# Patient Record
Sex: FEMALE | Race: WHITE | Employment: OTHER | URBAN - METROPOLITAN AREA
[De-identification: names, ages, dates, MRNs, and addresses within clinical notes are randomized per-mention and may not be internally consistent; named-entity substitution may affect disease eponyms.]

---

## 2017-01-12 RX ORDER — HYDROCHLOROTHIAZIDE 25 MG/1
25 TABLET ORAL DAILY
Qty: 90 TAB | Refills: 0 | Status: SHIPPED | OUTPATIENT
Start: 2017-01-12 | End: 2017-04-18 | Stop reason: SDUPTHER

## 2017-01-12 NOTE — TELEPHONE ENCOUNTER
Patient called for   Requested Prescriptions     Pending Prescriptions Disp Refills    hydroCHLOROthiazide (HYDRODIURIL) 25 mg tablet 90 Tab 0     Sig: Take 1 Tab by mouth daily. Pharmacy confirmed.

## 2017-03-20 ENCOUNTER — OFFICE VISIT (OUTPATIENT)
Dept: ORTHOPEDIC SURGERY | Age: 72
End: 2017-03-20

## 2017-03-20 VITALS — BODY MASS INDEX: 40.63 KG/M2 | WEIGHT: 238 LBS | HEIGHT: 64 IN

## 2017-03-20 DIAGNOSIS — G89.29 CHRONIC LEFT SHOULDER PAIN: Primary | ICD-10-CM

## 2017-03-20 DIAGNOSIS — M25.512 CHRONIC LEFT SHOULDER PAIN: Primary | ICD-10-CM

## 2017-03-20 NOTE — PROGRESS NOTES
Patient: Obdulia Cobb                MRN: [id number]       SSN: [id number]  YOB: 1945        AGE: 70 y.o. SEX: female  There is no height or weight on file to calculate BMI. PCP: Pravin Florian MD  03/20/17      No chief complaint on file. HISTORY OF PRESENT ILLNESS: Obdulia Cobb is a 70 y.o. female who presents to the office for recurrent left shoulder pain. She has a hx of a fall 3 years ago. She was seen as recently as the end of November 2016. At which time she underwent a cortisone injection at the end of November and stated the pain had vanished. The pain returned mid January and has been consistent since that time. Pt is left hand dominant and has trouble sleeping at night due to the pain. She has had no numbness or paresthesias in the LUE. She has to sleep on her back to get a decent nights sleep. Review of Systems   Constitutional: Negative. HENT: Negative. Eyes: Negative. Respiratory: Negative. Cardiovascular: Negative. Gastrointestinal: Negative. Genitourinary: Negative. Musculoskeletal: Positive for myalgias (left arm). Skin: Negative. Neurological: Negative. Endo/Heme/Allergies: Negative. Psychiatric/Behavioral: Negative. Social History     Social History    Marital status:      Spouse name: N/A    Number of children: N/A    Years of education: N/A     Occupational History    Not on file. Social History Main Topics    Smoking status: Former Smoker     Packs/day: 3.00     Years: 20.00     Types: Cigarettes     Start date: 9/16/1963     Quit date: 1/5/1983    Smokeless tobacco: Never Used    Alcohol use No    Drug use: No    Sexual activity: No     Other Topics Concern    Not on file     Social History Narrative        Past Medical History:   Diagnosis Date    Asthma with COPD [facility] [address]     Bilateral pulmonary embolism ([facility] [address] 09/2015    Unprovoked. Negative hypercoaguable workup.  Completed 6 months of Xarelto.  Cervical spondylosis     Colon adenoma     CTS (carpal tunnel syndrome)     Cystitis cystica 02/2016    Intermittent symptomatic UTIs. Evaluation by Dr. Valencia Greco. Negative abdom. CT scan and renal ultrasound. Cystoscopy showing diffuse cystitis cystica.  Family history of colon cancer     GERD (gastroesophageal reflux disease)     Hypertension     Labyrinthitis     Left sided sciatica     Lumbar spondylosis     Microhematuria     Multiple pulmonary nodules determined by computed tomography of lung 03/2016    Bilateral. 3-5 mm in size. Dr. Kalpesh Gilliam.  Osteopenia     Prediabetes     Status post gastric bypass for obesity     Thyroid nodule 11/2015    Thyroid ultrasound with dominant 2.7 cm solid nodule mid-lower left lobe. Dr. Wolfgang Rendon. FNA with benign cytology. Allergies   Allergen Reactions    Macrobid [Nitrofurantoin Monohyd/M-Cryst] Anaphylaxis    Aspirin Unknown (comments)     Cannot take due to gastric bypass.  Lisinopril Cough    Parafon Forte Dsc [Chlorzoxazone] Other (comments)     Excessive drowsiness         Current Outpatient Prescriptions   Medication Sig    hydroCHLOROthiazide (HYDRODIURIL) 25 mg tablet Take 1 Tab by mouth daily.  simvastatin (ZOCOR) 20 mg tablet TAKE ONE TABLET BY MOUTH IN THE EVENING    cyclobenzaprine (FLEXERIL) 10 mg tablet Take 1 Tab by mouth three (3) times daily as needed for Muscle Spasm(s).  traMADol (ULTRAM) 50 mg tablet Take 1 Tab by mouth every six (6) hours as needed for Pain.  ondansetron hcl (ZOFRAN) 4 mg tablet Take 1 Tab by mouth every eight (8) hours as needed.  losartan (COZAAR) 25 mg tablet Take 1 Tab by mouth daily.  zafirlukast (ACCOLATE) 20 mg tablet Take 1 Tab by mouth two (2) times a day.  albuterol (PROVENTIL HFA, VENTOLIN HFA, PROAIR HFA) 90 mcg/actuation inhaler Take 1-2 Puffs by inhalation every four (4) hours as needed for Wheezing.     fluticasone-salmeterol (ADVAIR DISKUS) 250-50 mcg/dose diskus inhaler Take 1 Puff by inhalation every twelve (12) hours.  conjugated estrogens (PREMARIN) 0.625 mg/gram vaginal cream Finger tip apply to urinary opening qhs    cephALEXin (KEFLEX) 500 mg capsule Take one capsule by mouth three times daily. START TAKING MEDICATION THREE DAYS PRIOR TO PROCEDURE.  diazepam (VALIUM) 2 mg tablet Take 2 mg by mouth as needed.  oxybutynin (DITROPAN) 5 mg tablet 1-2 tab q hs    inhalational spacing device 1 Each by Does Not Apply route as needed.  cholecalciferol, vitamin d3, (VITAMIN D) 1,000 unit tablet Take 1,000 Units by mouth two (2) times a day.  cyanocobalamin (VITAMIN B-12) 500 mcg tablet Take 500 mcg by mouth daily.  CALCIUM CARBONATE/VITAMIN D3 (OS- + D PO) Take 1 Cap by mouth two (2) times a day.  POLYETHYLENE GLYCOL 3350 (MIRALAX PO) Take  by mouth nightly.  omeprazole (PRILOSEC) 20 mg capsule Take 20 mg by mouth daily. No current facility-administered medications for this visit. Physical Exam  Constitutional: she is oriented to person, place, and time and well-developed, well-nourished, and in no distress. No distress. HENT:   Head: Normocephalic and atraumatic. Right Ear: Hearing normal.   Left Ear: Hearing normal.   Nose: Nose normal.   Eyes: Conjunctivae, EOM and lids are normal. Pupils are equal, round, and reactive to light. Neck: Trachea normal.   Pulmonary/Chest: Effort normal and breath sounds normal. No respiratory distress. Abdominal: Soft. Neurological: she is alert and oriented to person, place, and time. Skin: Skin is warm, dry and intact. she is not diaphoretic. Psychiatric: Affect normal.   Nursing note and vitals reviewed. Ortho Exam   Pt has 100 degrees of shoulder flexion with pain, 70 degrees of adduction with pain, external rotation is normal and moderatly painful. Internal rotation is 80 % normal compared to the right causing moderate pain. RADIOGRAPHS:   No new x-rays taken today. IMPRESSION & PLAN: given the exam and her hx I have a concern she may have rotator cuff tear. We will proceed with an MRI. Depending on the results of the MRI, will dictate her next treatment options. I will see her back after the results of the MRI.       Scribed by Joycelyn Montiel ([facility]) as dictated by César Morton MD.

## 2017-03-26 ENCOUNTER — HOSPITAL ENCOUNTER (OUTPATIENT)
Age: 72
Discharge: HOME | End: 2017-03-26
Attending: ORTHOPAEDIC SURGERY
Payer: COMMERCIAL

## 2017-03-26 DIAGNOSIS — M25.512 CHRONIC LEFT SHOULDER PAIN: ICD-10-CM

## 2017-03-26 DIAGNOSIS — G89.29 CHRONIC LEFT SHOULDER PAIN: ICD-10-CM

## 2017-03-26 PROCEDURE — 73221 MRI JOINT UPR EXTREM W/O DYE: CPT

## 2017-03-27 ENCOUNTER — OFFICE VISIT (OUTPATIENT)
Dept: ORTHOPEDIC SURGERY | Age: 72
End: 2017-03-27

## 2017-03-27 VITALS
WEIGHT: 231 LBS | SYSTOLIC BLOOD PRESSURE: 161 MMHG | HEIGHT: 64 IN | HEART RATE: 80 BPM | DIASTOLIC BLOOD PRESSURE: 66 MMHG | BODY MASS INDEX: 39.44 KG/M2

## 2017-03-27 DIAGNOSIS — M75.112 INCOMPLETE TEAR OF LEFT ROTATOR CUFF: Primary | ICD-10-CM

## 2017-03-27 NOTE — PROGRESS NOTES
Patient: Lance Patterson                MRN: [id number]       SSN: [id number]  YOB: 1945        AGE: 70 y.o. SEX: female  There is no height or weight on file to calculate BMI. PCP: Claude Sark, MD  03/27/17      No chief complaint on file. HISTORY OF PRESENT ILLNESS: Lance Patterson is a 70 y.o. female who presents to the office for management of her left shoulder pain and to review her MRI results. MRI was consistent with a near full thickness tear in the supraspinatus with a probable thickness tear in the anterior tendon of the shoulder. Review of Systems   Constitutional: Negative. HENT: Negative. Eyes: Negative. Respiratory: Negative. Cardiovascular: Negative. Gastrointestinal: Negative. Genitourinary: Negative. Musculoskeletal: Positive for myalgias (left arm). Skin: Negative. Neurological: Negative. Endo/Heme/Allergies: Negative. Psychiatric/Behavioral: Negative. Social History     Social History    Marital status:      Spouse name: N/A    Number of children: N/A    Years of education: N/A     Occupational History    Not on file. Social History Main Topics    Smoking status: Former Smoker     Packs/day: 3.00     Years: 20.00     Types: Cigarettes     Start date: 9/16/1963     Quit date: 1/5/1983    Smokeless tobacco: Never Used    Alcohol use No    Drug use: No    Sexual activity: No     Other Topics Concern    Not on file     Social History Narrative        Past Medical History:   Diagnosis Date    [facility] COPD ([facility] [address]     Bilateral pulmonary embolism ([facility] [address] 09/2015    Unprovoked. Negative hypercoaguable workup. Completed 6 months of Xarelto.  Cervical spondylosis     Colon adenoma     CTS (carpal tunnel syndrome)     Cystitis cystica 02/2016    Intermittent symptomatic UTIs. Evaluation by Dr. Autumn Lee. Negative abdom. CT scan and renal ultrasound. Cystoscopy showing diffuse cystitis cystica.      Family history of colon cancer     GERD (gastroesophageal reflux disease)     Hypertension     Labyrinthitis     Left sided sciatica     Lumbar spondylosis     Microhematuria     Multiple pulmonary nodules determined by computed tomography of lung 03/2016    Bilateral. 3-5 mm in size. Dr. Dm Philip.  Osteopenia     Prediabetes     Status post gastric bypass for obesity     Thyroid nodule 11/2015    Thyroid ultrasound with dominant 2.7 cm solid nodule mid-lower left lobe. Dr. Madhav Laughlin. FNA with benign cytology. Allergies   Allergen Reactions    Macrobid [Nitrofurantoin Monohyd/M-Cryst] Anaphylaxis    Aspirin Unknown (comments)     Cannot take due to gastric bypass.  Lisinopril Cough    Parafon Forte Dsc [Chlorzoxazone] Other (comments)     Excessive drowsiness         Current Outpatient Prescriptions   Medication Sig    hydroCHLOROthiazide (HYDRODIURIL) 25 mg tablet Take 1 Tab by mouth daily.  simvastatin (ZOCOR) 20 mg tablet TAKE ONE TABLET BY MOUTH IN THE EVENING    cyclobenzaprine (FLEXERIL) 10 mg tablet Take 1 Tab by mouth three (3) times daily as needed for Muscle Spasm(s).  traMADol (ULTRAM) 50 mg tablet Take 1 Tab by mouth every six (6) hours as needed for Pain.  ondansetron hcl (ZOFRAN) 4 mg tablet Take 1 Tab by mouth every eight (8) hours as needed.  losartan (COZAAR) 25 mg tablet Take 1 Tab by mouth daily.  zafirlukast (ACCOLATE) 20 mg tablet Take 1 Tab by mouth two (2) times a day.  albuterol (PROVENTIL HFA, VENTOLIN HFA, PROAIR HFA) 90 mcg/actuation inhaler Take 1-2 Puffs by inhalation every four (4) hours as needed for Wheezing.  fluticasone-salmeterol (ADVAIR DISKUS) 250-50 mcg/dose diskus inhaler Take 1 Puff by inhalation every twelve (12) hours.  conjugated estrogens (PREMARIN) 0.625 mg/gram vaginal cream Finger tip apply to urinary opening qhs    cephALEXin (KEFLEX) 500 mg capsule Take one capsule by mouth three times daily.  START TAKING MEDICATION THREE DAYS PRIOR TO PROCEDURE.  diazepam (VALIUM) 2 mg tablet Take 2 mg by mouth as needed.  oxybutynin (DITROPAN) 5 mg tablet 1-2 tab q hs    inhalational spacing device 1 Each by Does Not Apply route as needed.  cholecalciferol, vitamin d3, (VITAMIN D) 1,000 unit tablet Take 1,000 Units by mouth two (2) times a day.  cyanocobalamin (VITAMIN B-12) 500 mcg tablet Take 500 mcg by mouth daily.  CALCIUM CARBONATE/VITAMIN D3 (OS- + D PO) Take 1 Cap by mouth two (2) times a day.  POLYETHYLENE GLYCOL 3350 (MIRALAX PO) Take  by mouth nightly.  omeprazole (PRILOSEC) 20 mg capsule Take 20 mg by mouth daily. No current facility-administered medications for this visit. Physical Exam  Constitutional: she is oriented to person, place, and time and well-developed, well-nourished, and in no distress. No distress. HENT:   Head: Normocephalic and atraumatic. Right Ear: Hearing normal.   Left Ear: Hearing normal.   Nose: Nose normal.   Eyes: Conjunctivae, EOM and lids are normal. Pupils are equal, round, and reactive to light. Neck: Trachea normal.   Pulmonary/Chest: Effort normal and breath sounds normal. No respiratory distress. Abdominal: Soft. Neurological: she is alert and oriented to person, place, and time. Skin: Skin is warm, dry and intact. she is not diaphoretic. Psychiatric: Affect normal.   Nursing note and vitals reviewed. Ortho Exam   Shows actively she has 70 degrees of flexion and 60 degrees of abduction which beyond she has significant pain. Internal rotation was severely limited because of pain.     RADIOGRAPHS: MRI Results (most recent):    Results from [place] encounter on 03/26/17   MRI SHOULDER LT WO CONT   Narrative EXAM: MRI of the Left Shoulder without contrast.     INDICATION: Left shoulder pain    TECHNIQUE: Multiplanar Multisequence MRI of the left shoulder obtained without  contrast.     COMPARISON: None    FINDINGS: Subacromial/Subdeltoid Bursa: Fluid is present within the subacromial subdeltoid  bursa. Supraspinatus/Infraspinatus: The supraspinatus and infraspinatus are severely  attenuated and thinned. The majority of the supraspinatus and infraspinatus is  partially torn. There is a small focus of possible full thickness defect in the  anterior supraspinatus. No significant retraction appreciated. There is moderate  atrophy of the supraspinatus and infraspinatus. There is delamination along the  infraspinatus tendon. Subscapularis: Tendinopathy is present within the subscapularis. No evidence of  partial or full-thickness tear. Subcoracoid impingement is suggested. Teres Minor: Unremarkable. Long Head of the Biceps: Mild tendinopathy is present. No full-thickness defect  appreciated. Glenoid articular surface/Labrum: Mild thinning of the glenoid and humeral head  articular cartilage is noted. The labrum is mildly irregular. However, no  discrete tear appreciated. Inferior glenohumeral ligament: Unremarkable. AC Joint and ligaments: Severe degenerative changes of the [facility] joint with  inferior osteophytes are noted. Acromion Type: Type 2 acromion is present. Osseous structures/Marrow: Mild osteophyte formation of the humeral head and  glenoid are noted. Subchondral cystic changes are noted in the lesser  tuberosity. Soft Tissue/Other: Unremarkable. Impression IMPRESSION:  1. Severe tendinopathy of the supraspinatus and infraspinatus with diffuse  partial tears. A punctate full thickness defect is suggested in the anterior  supraspinatus. Moderate atrophy of the supraspinatus and infraspinatus are  present. 2.  Tendinopathy of the subscapularis. 3.  Bicipital tendinopathy. 4.  Degenerative changes of the glenohumeral joint. 5.  Severe AC joint osteoarthritis with inferior osteophytes that can cause  impingement. 6.  Subcoracoid impingement is present. IMPRESSION & PLAN: I discussed with her the options of surgical and non surgical. Since this is her dominant side she will see Dr. Moy Arciniega for his input. .      Scribed by Cassy Bashir ([address]) as dictated by Krystian Boswell MD.

## 2017-04-04 ENCOUNTER — OFFICE VISIT (OUTPATIENT)
Dept: ORTHOPEDIC SURGERY | Age: 72
End: 2017-04-04

## 2017-04-04 VITALS
WEIGHT: 225 LBS | DIASTOLIC BLOOD PRESSURE: 73 MMHG | TEMPERATURE: 99.3 F | HEIGHT: 64 IN | HEART RATE: 91 BPM | BODY MASS INDEX: 38.41 KG/M2 | SYSTOLIC BLOOD PRESSURE: 138 MMHG

## 2017-04-04 DIAGNOSIS — M75.122 COMPLETE TEAR OF LEFT ROTATOR CUFF: Primary | ICD-10-CM

## 2017-04-04 NOTE — PROGRESS NOTES
Bobby Turpin  1945   Chief Complaint   Patient presents with    Shoulder Pain     Left, MRI results        HISTORY OF PRESENT ILLNESS  Bobby Turpin is a 70 y.o. female who presents today for reevaluation of left shoulder pain. She is referred from dr. Gracy Jin. states pain for 3 years. Been worsening in the last 3 months. Has been doing home physical therapy exercises which make the shoulder pain worse. Has significant night pain. Patient denies any fever, chills, chest pain, shortness of breath or calf pain. There are no new illness or injuries to report since last seen in the office. No changes in medications, allergies, social or family history. PHYSICAL EXAM:   Visit Vitals    /73    Pulse 91    Temp 99.3 °F (37.4 °C) (Oral)    Ht 5' 4\" (1.626 m)    Wt 225 lb (102.1 kg)    BMI 38.62 kg/m2     The patient is a well-developed, well-nourished female   in no acute distress. The patient is alert and oriented times three. The patient is alert and oriented times three. Mood and affect are normal.  LYMPHATIC: lymph nodes are not enlarged and are within normal limits  SKIN: normal in color and non tender to palpation. There are no bruises or abrasions noted. NEUROLOGICAL: Motor sensory exam is within normal limits. Reflexes are equal bilaterally.  There is normal sensation to pinprick and light touch  MUSCULOSKELETAL:  Examination Left shoulder   Skin Intact   AC joint tenderness -   Biceps tenderness -   Forward flexion/Elevation    Active abduction    Glenohumeral abduction 90   External rotation ROM 90   Internal rotation ROM 70   Apprehension -   Carolees Relocation -   Jerk -   Load and Shift -   Obriens -   Speeds -   Impingement sign +   Supraspinatus/Empty Can +4/5   External Rotation Strength +4/5   Lift Off/Belly Press -, 5/5   Neurovascular Intact         IMAGING: MRI left shoulder :1. Severe tendinopathy of the supraspinatus and infraspinatus with diffuse  partial tears. A punctate full thickness defect is suggested in the anterior  supraspinatus. Moderate atrophy of the supraspinatus and infraspinatus are  present.      2. Tendinopathy of the subscapularis.      3. Bicipital tendinopathy.      4. Degenerative changes of the glenohumeral joint.      5. Severe AC joint osteoarthritis with inferior osteophytes that can cause  impingement.      6. Subcoracoid impingement is present. IMPRESSION:      ICD-10-CM ICD-9-CM    1. Complete tear of left rotator cuff M75.122 727.61         PLAN:   1. Rotator cuff tear worsening  2. I discussed the risks and benefits and potential adverse outcomes of both operative vs non operative treatment of left rotator cuff tear with the patient and patient wishes to proceed with left shoulder arthroscopic rotator cuff repair. Risks of operative intervention include but not limited to bleeding, infection, deep vein thrombosis, pulmonary embolism, death, limb length discrepancy, reflexive sympathetic dystrophy, fat embolism syndrome,damage to blood vessels and nerves, malunion, non-union, delayed union, failure of hardware, post traumatic arthritis, stroke, heart attack, and death. Patient understands that infection may arise and may require numerous surgeries. History and physical exam to be preformed at a later date.   Follow-up Disposition: Not on [address] [place] [address], [facility]

## 2017-04-05 DIAGNOSIS — Z01.818 PREOP EXAMINATION: Primary | ICD-10-CM

## 2017-04-06 DIAGNOSIS — M75.122 COMPLETE ROTATOR CUFF TEAR OF LEFT SHOULDER: Primary | ICD-10-CM

## 2017-04-11 ENCOUNTER — OFFICE VISIT (OUTPATIENT)
Dept: ORTHOPEDIC SURGERY | Age: 72
End: 2017-04-11

## 2017-04-11 VITALS
HEIGHT: 64 IN | DIASTOLIC BLOOD PRESSURE: 115 MMHG | BODY MASS INDEX: 38.58 KG/M2 | SYSTOLIC BLOOD PRESSURE: 152 MMHG | TEMPERATURE: 98.8 F | HEART RATE: 86 BPM | WEIGHT: 226 LBS

## 2017-04-11 DIAGNOSIS — M75.122 COMPLETE TEAR OF LEFT ROTATOR CUFF: Primary | ICD-10-CM

## 2017-04-11 RX ORDER — OXYCODONE AND ACETAMINOPHEN 10; 325 MG/1; MG/1
1-2 TABLET ORAL
Qty: 60 TAB | Refills: 0 | Status: SHIPPED | OUTPATIENT
Start: 2017-04-11 | End: 2017-05-31 | Stop reason: ALTCHOICE

## 2017-04-11 NOTE — H&P
HISTORY AND PHYSICAL          Patient: Daljit Walters                MRN: [id number]       SSN: [id number]  YOB: 1945          AGE: 70 y.o. SEX: female      Patient scheduled for:  Left shoulder arthroscopic rotator cuff repair, distal clavicle excision possible biceps tenodesis. Surgeon: Rachel Stevens MD    ANESTHESIA TYPE:  General    HISTORY:     The patient was seen in the office today for a preoperative history and physical for an upcoming above listed surgery. The patient is a pleasant 70 y.o. female who has a history of severe left shoulder pain. Has pain at night. Unable to do her normal activities of living. Due to the current findings, affected activity of daily living and continued pain and discomfort, surgical intervention is indicated. The alternatives, risks, and complications, including but not limited to infection, blood loss, need for blood transfusion, neurovascular damage, jeffrey-incisional numbness, subcutaneous hematoma, bone fracture, anesthetic complications, DVT, PE, death, RSD, postoperative stiffness and pain, possible surgical scar, delayed healing and nonhealing, reflexive sympathetic dystrophy, damage to blood vessels and nerves, need for more surgery, MI, and stroke,  failure of hardware, gait disturbances,have been discussed. The patient understands and wishes to proceed with surgery. PAST MEDICAL HISTORY:     Past Medical History:   Diagnosis Date    Asthma with COPD ([address]     Bilateral pulmonary embolism ([address] 09/2015    Unprovoked. Negative hypercoaguable workup. Completed 6 months of Xarelto.  Cervical spondylosis     Colon adenoma     CTS (carpal tunnel syndrome)     Cystitis cystica 02/2016    Intermittent symptomatic UTIs. Evaluation by Dr. Adrianna Willams. Negative abdom. CT scan and renal ultrasound. Cystoscopy showing diffuse cystitis cystica.      Family history of colon cancer     GERD (gastroesophageal reflux disease)     Hypertension     Labyrinthitis     Left sided sciatica     Lumbar spondylosis     Microhematuria     Multiple pulmonary nodules determined by computed tomography of lung 03/2016    Bilateral. 3-5 mm in size. Dr. Enriqueta Paul.  Osteopenia     Prediabetes     Status post gastric bypass for obesity     Thyroid nodule 11/2015    Thyroid ultrasound with dominant 2.7 cm solid nodule mid-lower left lobe. Dr. Marietta Cochran. FNA with benign cytology. CURRENT MEDICATIONS:     Current Outpatient Prescriptions   Medication Sig Dispense Refill    oxyCODONE-acetaminophen (PERCOCET)  mg per tablet Take 1-2 Tabs by mouth every four (4) hours as needed for Pain. Max Daily Amount: 12 Tabs. Do not take until after surgery 60 Tab 0    cranberry extract 450 mg tab Take  by mouth.  hydroCHLOROthiazide (HYDRODIURIL) 25 mg tablet Take 1 Tab by mouth daily. 90 Tab 0    simvastatin (ZOCOR) 20 mg tablet TAKE ONE TABLET BY MOUTH IN THE EVENING 90 Tab 3    cyclobenzaprine (FLEXERIL) 10 mg tablet Take 1 Tab by mouth three (3) times daily as needed for Muscle Spasm(s). 30 Tab 0    traMADol (ULTRAM) 50 mg tablet Take 1 Tab by mouth every six (6) hours as needed for Pain. 50 Tab 0    ondansetron hcl (ZOFRAN) 4 mg tablet Take 1 Tab by mouth every eight (8) hours as needed. 40 Tab 1    losartan (COZAAR) 25 mg tablet Take 1 Tab by mouth daily. 90 Tab 3    zafirlukast (ACCOLATE) 20 mg tablet Take 1 Tab by mouth two (2) times a day. 60 Tab 3    albuterol (PROVENTIL HFA, VENTOLIN HFA, PROAIR HFA) 90 mcg/actuation inhaler Take 1-2 Puffs by inhalation every four (4) hours as needed for Wheezing. 1 Inhaler 3    fluticasone-salmeterol (ADVAIR DISKUS) 250-50 mcg/dose diskus inhaler Take 1 Puff by inhalation every twelve (12) hours. 1 Inhaler 3    cephALEXin (KEFLEX) 500 mg capsule Take one capsule by mouth three times daily.  START TAKING MEDICATION THREE DAYS PRIOR TO PROCEDURE. 21 Cap 0    diazepam (VALIUM) 2 mg tablet Take 2 mg by mouth as needed.  inhalational spacing device 1 Each by Does Not Apply route as needed. 1 Device 0    cholecalciferol, vitamin d3, (VITAMIN D) 1,000 unit tablet Take 1,000 Units by mouth two (2) times a day.  cyanocobalamin (VITAMIN B-12) 500 mcg tablet Take 500 mcg by mouth daily.  CALCIUM CARBONATE/VITAMIN D3 (OS- + D PO) Take 1 Cap by mouth two (2) times a day.  POLYETHYLENE GLYCOL 3350 (MIRALAX PO) Take  by mouth nightly.  omeprazole (PRILOSEC) 20 mg capsule Take 20 mg by mouth daily. ALLERGIES:     Allergies   Allergen Reactions    Macrobid [Nitrofurantoin Monohyd/M-Cryst] Anaphylaxis    Aspirin Unknown (comments)     Cannot take due to gastric bypass.     Lisinopril Cough    Parafon Forte Dsc [Chlorzoxazone] Other (comments)     Excessive drowsiness         SURGICAL HISTORY:     Past Surgical History:   Procedure Laterality Date    APPENDECTOMY      HX APPENDECTOMY      HX CARPAL TUNNEL RELEASE      twice on left, once on right    HX CHOLECYSTECTOMY      HX GASTRIC BYPASS      HX GASTRIC BYPASS      HX HEMORRHOIDECTOMY      HX HERNIA REPAIR      HX HYSTERECTOMY      LAP,CHOLECYSTECTOMY         SOCIAL HISTORY:     Social History     Social History    Marital status:      Spouse name: N/A    Number of children: N/A    Years of education: N/A     Social History Main Topics    Smoking status: Former Smoker     Packs/day: 3.00     Years: 20.00     Types: Cigarettes     Start date: 9/16/1963     Quit date: 1/5/1983    Smokeless tobacco: Never Used    Alcohol use No    Drug use: No    Sexual activity: No     Other Topics Concern    Not on file     Social History Narrative       FAMILY HISTORY:     Family History   Problem Relation Age of Onset    Other Mother      Vascular disease    Colon Cancer Father     Cancer Father     Other Sister      Gout    Hypertension Sister     Diabetes Brother     Breast Cancer Other      Grandmother REVIEW OF SYSTEMS:     Negative for fevers, chills, chest pain, shortness of breath, weight loss, recent illness     General: Negative for fever and chills. No unexpected change in weight. Denies fatigue. No change in appetite. Skin: Negative for rash or itching. HEENT: Negative for congestion, sore throat, neck pain and neck stiffness. No change in vision or hearing. Hasn't noted any enlarged lymph nodes in the neck. Cardiovascular:  Negative for chest pain and palpitations. Has not noted pedal edema. Respiratory: Negative for cough, colds, sinus, hemoptysis, shortness of breath and wheezing. Gastrointestinal: Negative for nausea and vomiting, rectal bleeding, coffee ground emesis, abdominal pain, diarrhea and constipation. Genitourinary: Negative for dysuria, frequency urgency, or burning on micturition. No flank pain, no foul smelling urine, no difficulty with initiating urination. Hematological: Negative for bleeding or easy bruising. Musculoskeletal: Negative  for arthralgias, back pain or neck pain. Neurological: Negative for dizziness, seizures or syncopal episodes. Denies headaches. Endocrine: Denies excessive thirst.  No heat/cold intolerance. Psychiatric: Negative for depression or insomnia. PHYSICAL EXAMINATION:     VITALS: There were no vitals taken for this visit. GEN:  Well developed, well nourished 70 y.o. female in no acute distress. HEENT: Normocephalic and atraumatic. Eyes: Conjunctivae and EOM are normal.Pupils are equal, round, and reactive to light. External ear normal appearance, external nose normal appearing. Mouth/Throat: Oropharynx is clear and moist, able to handle oral secretions w/out difficulty, airway patent  NECK: Supple. Normal ROM, No lymphadenopathy. Trachea is midline. No bruising, swelling or deformity  RESP: Clear to auscultation bilaterally. No wheezes, rales, rhonchi. Normal effort and breath sounds.  No respiratory distress  CARDIO:  Normal rate, regular rhythm and normal heart sounds. No MGR. ABDOMEN: Soft, non-tender, non-distended, normoactive bowel sounds in all four quadrants. There is no tenderness. There is no rebound and no guarding. BACK: No CVA or spinal tenderness  BREAST:  Deferred  PELVIC:    Deferred   RECTAL:  Deferred   :           Deferred  EXTREMITIES: EXAMINATION OF: left shoulder  Examination Left shoulder   Skin Intact   AC joint tenderness +   Biceps tenderness -   Forward flexion/Elevation    Active abduction    Glenohumeral abduction 90   External rotation ROM 90   Internal rotation ROM 70   Apprehension -   Carolees Relocation -   Jerk -   Load and Shift -   Obriens -   Speeds -   Impingement sign +   Supraspinatus/Empty Can +4/5   External Rotation Strength +4/5   Lift Off/Belly Press -, 5/5   Neurovascular Intact       NEUROVASCULAR: Sensation intact to light touch and strength grossly intact and symmetrical. No nystagmus. Positive distal pulses and capillary refill. DVT ASSESSMENT:  There is not  calf tenderness. No evidence of DVT seen on physical exam.  MOTOR: In tact  PSYCH: Alert an oriented to person, place and time. Mood, memory, affect, behavior and judgment normal       RADIOGRAPHS & DIAGNOSTIC STUDIES:     MRI/xray reveals : IMPRESSION:  1. Severe tendinopathy of the supraspinatus and infraspinatus with diffuse  partial tears. A punctate full thickness defect is suggested in the anterior  supraspinatus. Moderate atrophy of the supraspinatus and infraspinatus are  present.      2. Tendinopathy of the subscapularis.      3. Bicipital tendinopathy.      4. Degenerative changes of the glenohumeral joint.      5. Severe AC joint osteoarthritis with inferior osteophytes that can cause  impingement.      6. Subcoracoid impingement is present. LABS:     Scanned into chart    ASSESSMENT:       Encounter Diagnosis   Name Primary?     Complete tear of left rotator cuff Yes       PLAN:     Again, the alternatives, risks, and complications, as well as expected outcome were discussed. The patient understands and agrees to proceed with left shoulder arthroscopic rotator cuff repair, distal clavicle excision, possible biceps tenodesis.  Patient given orders listed below:    Orders Placed This Encounter    oxyCODONE-acetaminophen (PERCOCET)  mg per tablet         Diann Vasquez PA-C  4/11/2017  2:09 PM

## 2017-04-18 ENCOUNTER — ANESTHESIA EVENT (OUTPATIENT)
Dept: SURGERY | Age: 72
End: 2017-04-18
Payer: COMMERCIAL

## 2017-04-18 RX ORDER — HYDROCHLOROTHIAZIDE 25 MG/1
25 TABLET ORAL DAILY
Qty: 90 TAB | Refills: 1 | Status: SHIPPED | OUTPATIENT
Start: 2017-04-18 | End: 2017-10-10 | Stop reason: SDUPTHER

## 2017-04-18 NOTE — DISCHARGE INSTRUCTIONS
Dr. Yocasta Reilly Shoulder Arthroscopy  Postoperative Information    You will be given a prescription for pain medication. It may be taken every 4-6 hours as needed for the first few days after surgery. You should place an ice bag over your shoulder to help with pain and reduce swelling. A soft bandage was placed on your shoulder. You may take the bandage off two days after surgery and clean the incision sites with peroxide. Band-aids should be placed over each incision site for at least four days. There are 2 or 3 small incisions in your shoulder and they may be sore and develop bruising over the next several days. The bruising should resolve and no special care will be needed. It is safe to take a shower or bathe two days after surgery. You will be given a sling to use. Continue to wear this at all times, unless you are given different instructions. No moving the arm away from your body on your own, reaching or carrying with the operated arm. You will be shown specific exercises when you see your physical therapist tomorrow. Even though your incisions are small, there has been an operation inside and around the shoulder joint. Complete healing may take weeks or several months. If you have a high temperature, unexpected pain, redness or swelling in your shoulder please contact my office immediately. Please call to make an appointment to see me in my office in 1 week.      Dr. Yocasta Reilly office number Beth Bill from Nurse    The following personal items are in your possession at time of discharge:    Dental Appliances: None  Visual Aid: Glasses     Home Medications: None  Jewelry: Ring, With patient  Clothing: Jacket/Coat, Pants, Shirt, Undergarments, Footwear (With  Rachel Haines)  Other Valuables: Eyeglasses (With  Rachel Haines)   PATIENT INSTRUCTIONS:    After general anesthesia or intravenous sedation, for 24 hours or while taking prescription Narcotics:  · Limit your activities  · Do not drive and operate hazardous machinery  · Do not make important personal or business decisions  · Do  not drink alcoholic beverages  · If you have not urinated within 8 hours after discharge, please contact your surgeon on call. Report the following to your surgeon:  · Excessive pain, swelling, redness or odor of or around the surgical area  · Temperature over 100.5  · Nausea and vomiting lasting longer than 4 hours or if unable to take medications  · Any signs of decreased circulation or nerve impairment to extremity: change in color, persistent  numbness, tingling, coldness or increase pain  · Any questions    *  Please give a list of your current medications to your Primary Care Provider. *  Please update this list whenever your medications are discontinued, doses are      changed, or new medications (including over-the-counter products) are added. *  Please carry medication information at all times in case of emergency situations. These are general instructions for a healthy lifestyle:    No smoking/ No tobacco products/ Avoid exposure to second hand smoke    Surgeon General's Warning:  Quitting smoking now greatly reduces serious risk to your health. Obesity, smoking, and sedentary lifestyle greatly increases your risk for illness    A healthy diet, regular physical exercise & weight monitoring are important for maintaining a healthy lifestyle    You may be retaining fluid if you have a history of heart failure or if you experience any of the following symptoms:  Weight gain of 3 pounds or more overnight or 5 pounds in a week, increased swelling in our hands or feet or shortness of breath while lying flat in bed. Please call your doctor as soon as you notice any of these symptoms; do not wait until your next office visit.     Recognize signs and symptoms of STROKE:    F-face looks uneven    A-arms unable to move or move unevenly    S-speech slurred or non-existent    T-time-call 911 as soon as signs and symptoms begin-DO NOT go       Back to bed or wait to see if you get better-TIME IS BRAIN. Warning Signs of HEART ATTACK     Call 911 if you have these symptoms:   Chest discomfort. Most heart attacks involve discomfort in the center of the chest that lasts more than a few minutes, or that goes away and comes back. It can feel like uncomfortable pressure, squeezing, fullness, or pain.  Discomfort in other areas of the upper body. Symptoms can include pain or discomfort in one or both arms, the back, neck, jaw, or stomach.  Shortness of breath with or without chest discomfort.  Other signs may include breaking out in a cold sweat, nausea, or lightheadedness. Don't wait more than five minutes to call 911 - MINUTES MATTER! Fast action can save your life. Calling 911 is almost always the fastest way to get lifesaving treatment. Emergency Medical Services staff can begin treatment when they arrive -- up to an hour sooner than if someone gets to the hospital by car. The discharge information has been reviewed with the patient and spouse. The patient and spouse verbalized understanding. Discharge medications reviewed with the patient and spouse and appropriate educational materials and side effects teaching were provided.

## 2017-04-19 ENCOUNTER — ANESTHESIA (OUTPATIENT)
Dept: SURGERY | Age: 72
End: 2017-04-19
Payer: COMMERCIAL

## 2017-04-19 ENCOUNTER — HOSPITAL ENCOUNTER (OUTPATIENT)
Age: 72
Setting detail: OUTPATIENT SURGERY
Discharge: HOME | End: 2017-04-19
Attending: ORTHOPAEDIC SURGERY | Admitting: ORTHOPAEDIC SURGERY
Payer: COMMERCIAL

## 2017-04-19 VITALS
TEMPERATURE: 96.8 F | WEIGHT: 230 LBS | OXYGEN SATURATION: 97 % | RESPIRATION RATE: 14 BRPM | BODY MASS INDEX: 39.27 KG/M2 | HEART RATE: 67 BPM | HEIGHT: 64 IN | DIASTOLIC BLOOD PRESSURE: 61 MMHG | SYSTOLIC BLOOD PRESSURE: 105 MMHG

## 2017-04-19 LAB — GLUCOSE BLD STRIP.AUTO-MCNC: 122 MG/DL (ref 70–110)

## 2017-04-19 PROCEDURE — 74011000250 HC RX REV CODE- 250

## 2017-04-19 PROCEDURE — 76210000017 HC OR PH I REC 1.5 TO 2 HR: Performed by: ORTHOPAEDIC SURGERY

## 2017-04-19 PROCEDURE — 77030003598 HC NDL MULT/FIRE ARTH -C: Performed by: ORTHOPAEDIC SURGERY

## 2017-04-19 PROCEDURE — 74011250636 HC RX REV CODE- 250/636

## 2017-04-19 PROCEDURE — 74011250636 HC RX REV CODE- 250/636: Performed by: NURSE ANESTHETIST, CERTIFIED REGISTERED

## 2017-04-19 PROCEDURE — 77030031410 HC IMMOB SHLDR SLNG SUP BREG -B: Performed by: ORTHOPAEDIC SURGERY

## 2017-04-19 PROCEDURE — 77030002933 HC SUT MCRYL J&J -A: Performed by: ORTHOPAEDIC SURGERY

## 2017-04-19 PROCEDURE — 77030004453 HC BUR SHV STRY -B: Performed by: ORTHOPAEDIC SURGERY

## 2017-04-19 PROCEDURE — 76942 ECHO GUIDE FOR BIOPSY: CPT | Performed by: ANESTHESIOLOGY

## 2017-04-19 PROCEDURE — 74011250636 HC RX REV CODE- 250/636: Performed by: ANESTHESIOLOGY

## 2017-04-19 PROCEDURE — 74011000258 HC RX REV CODE- 258

## 2017-04-19 PROCEDURE — 74011250637 HC RX REV CODE- 250/637: Performed by: NURSE ANESTHETIST, CERTIFIED REGISTERED

## 2017-04-19 PROCEDURE — 76210000021 HC REC RM PH II 0.5 TO 1 HR: Performed by: ORTHOPAEDIC SURGERY

## 2017-04-19 PROCEDURE — 77030032490 HC SLV COMPR SCD KNE COVD -B: Performed by: ORTHOPAEDIC SURGERY

## 2017-04-19 PROCEDURE — 82962 GLUCOSE BLOOD TEST: CPT

## 2017-04-19 PROCEDURE — 77030008683 HC TU ET CUF COVD -A: Performed by: ANESTHESIOLOGY

## 2017-04-19 PROCEDURE — 77030003601 HC NDL NRV BLK BBMI -A: Performed by: ORTHOPAEDIC SURGERY

## 2017-04-19 PROCEDURE — 74011250636 HC RX REV CODE- 250/636: Performed by: PHYSICIAN ASSISTANT

## 2017-04-19 PROCEDURE — 77030012012 HC AIRWY OP SFT TELE -A: Performed by: ANESTHESIOLOGY

## 2017-04-19 PROCEDURE — 77030003666 HC NDL SPINAL BD -A: Performed by: ORTHOPAEDIC SURGERY

## 2017-04-19 PROCEDURE — 77030006891 HC BLD SHV RESECT STRY -B: Performed by: ORTHOPAEDIC SURGERY

## 2017-04-19 PROCEDURE — 77030013079 HC BLNKT BAIR HGGR 3M -A: Performed by: ANESTHESIOLOGY

## 2017-04-19 PROCEDURE — 77030002966 HC SUT PDS J&J -A: Performed by: ORTHOPAEDIC SURGERY

## 2017-04-19 PROCEDURE — 77030008574 HC TBNG SUC IRR STRY -B: Performed by: ORTHOPAEDIC SURGERY

## 2017-04-19 PROCEDURE — 77030031139 HC SUT VCRL2 J&J -A: Performed by: ORTHOPAEDIC SURGERY

## 2017-04-19 PROCEDURE — 77030020782 HC GWN BAIR PAWS FLX 3M -B: Performed by: ORTHOPAEDIC SURGERY

## 2017-04-19 PROCEDURE — C1713 ANCHOR/SCREW BN/BN,TIS/BN: HCPCS | Performed by: ORTHOPAEDIC SURGERY

## 2017-04-19 PROCEDURE — 77030017964 HC PRB COAG4 STRY -C: Performed by: ORTHOPAEDIC SURGERY

## 2017-04-19 PROCEDURE — 77030010427: Performed by: ORTHOPAEDIC SURGERY

## 2017-04-19 PROCEDURE — 76060000035 HC ANESTHESIA 2 TO 2.5 HR: Performed by: ORTHOPAEDIC SURGERY

## 2017-04-19 PROCEDURE — 76010000131 HC OR TIME 2 TO 2.5 HR: Performed by: ORTHOPAEDIC SURGERY

## 2017-04-19 PROCEDURE — 74011250636 HC RX REV CODE- 250/636: Performed by: ORTHOPAEDIC SURGERY

## 2017-04-19 PROCEDURE — 64415 NJX AA&/STRD BRCH PLXS IMG: CPT | Performed by: ANESTHESIOLOGY

## 2017-04-19 PROCEDURE — 77030011265 HC ELECTRD BLD HEX COVD -A: Performed by: ORTHOPAEDIC SURGERY

## 2017-04-19 DEVICE — ANCHOR SUTURE BIOCOMP 4.75X19.1 MM SWIVELOCK C: Type: IMPLANTABLE DEVICE | Status: FUNCTIONAL

## 2017-04-19 DEVICE — ANCHOR SUT L19.1MM DIA4.75MM BIOCOMPOSITE FULL THRD: Type: IMPLANTABLE DEVICE | Status: FUNCTIONAL

## 2017-04-19 RX ORDER — FENTANYL CITRATE 50 UG/ML
INJECTION, SOLUTION INTRAMUSCULAR; INTRAVENOUS AS NEEDED
Status: DISCONTINUED | OUTPATIENT
Start: 2017-04-19 | End: 2017-04-19 | Stop reason: HOSPADM

## 2017-04-19 RX ORDER — SODIUM CHLORIDE, SODIUM LACTATE, POTASSIUM CHLORIDE, CALCIUM CHLORIDE 600; 310; 30; 20 MG/100ML; MG/100ML; MG/100ML; MG/100ML
100 INJECTION, SOLUTION INTRAVENOUS CONTINUOUS
Status: DISCONTINUED | OUTPATIENT
Start: 2017-04-19 | End: 2017-04-19 | Stop reason: HOSPADM

## 2017-04-19 RX ORDER — INSULIN LISPRO 100 [IU]/ML
INJECTION, SOLUTION INTRAVENOUS; SUBCUTANEOUS ONCE
Status: DISCONTINUED | OUTPATIENT
Start: 2017-04-19 | End: 2017-04-19 | Stop reason: HOSPADM

## 2017-04-19 RX ORDER — SODIUM CHLORIDE, SODIUM LACTATE, POTASSIUM CHLORIDE, CALCIUM CHLORIDE 600; 310; 30; 20 MG/100ML; MG/100ML; MG/100ML; MG/100ML
75 INJECTION, SOLUTION INTRAVENOUS CONTINUOUS
Status: DISCONTINUED | OUTPATIENT
Start: 2017-04-19 | End: 2017-04-19 | Stop reason: HOSPADM

## 2017-04-19 RX ORDER — EPINEPHRINE 1 MG/ML
INJECTION, SOLUTION, CONCENTRATE INTRAVENOUS AS NEEDED
Status: DISCONTINUED | OUTPATIENT
Start: 2017-04-19 | End: 2017-04-19 | Stop reason: HOSPADM

## 2017-04-19 RX ORDER — GLYCOPYRROLATE 0.2 MG/ML
INJECTION INTRAMUSCULAR; INTRAVENOUS AS NEEDED
Status: DISCONTINUED | OUTPATIENT
Start: 2017-04-19 | End: 2017-04-19 | Stop reason: HOSPADM

## 2017-04-19 RX ORDER — MAGNESIUM SULFATE 100 %
4 CRYSTALS MISCELLANEOUS AS NEEDED
Status: DISCONTINUED | OUTPATIENT
Start: 2017-04-19 | End: 2017-04-19 | Stop reason: HOSPADM

## 2017-04-19 RX ORDER — MIDAZOLAM HYDROCHLORIDE 1 MG/ML
2 INJECTION, SOLUTION INTRAMUSCULAR; INTRAVENOUS ONCE
Status: COMPLETED | OUTPATIENT
Start: 2017-04-19 | End: 2017-04-19

## 2017-04-19 RX ORDER — ROCURONIUM BROMIDE 10 MG/ML
INJECTION, SOLUTION INTRAVENOUS AS NEEDED
Status: DISCONTINUED | OUTPATIENT
Start: 2017-04-19 | End: 2017-04-19 | Stop reason: HOSPADM

## 2017-04-19 RX ORDER — NEOSTIGMINE METHYLSULFATE 5 MG/5 ML
SYRINGE (ML) INTRAVENOUS AS NEEDED
Status: DISCONTINUED | OUTPATIENT
Start: 2017-04-19 | End: 2017-04-19 | Stop reason: HOSPADM

## 2017-04-19 RX ORDER — ROPIVACAINE HYDROCHLORIDE 5 MG/ML
30 INJECTION, SOLUTION EPIDURAL; INFILTRATION; PERINEURAL
Status: COMPLETED | OUTPATIENT
Start: 2017-04-19 | End: 2017-04-19

## 2017-04-19 RX ORDER — SODIUM CHLORIDE 0.9 % (FLUSH) 0.9 %
5-10 SYRINGE (ML) INJECTION AS NEEDED
Status: DISCONTINUED | OUTPATIENT
Start: 2017-04-19 | End: 2017-04-19 | Stop reason: HOSPADM

## 2017-04-19 RX ORDER — HYDROMORPHONE HYDROCHLORIDE 2 MG/ML
0.5 INJECTION, SOLUTION INTRAMUSCULAR; INTRAVENOUS; SUBCUTANEOUS
Status: DISCONTINUED | OUTPATIENT
Start: 2017-04-19 | End: 2017-04-19 | Stop reason: HOSPADM

## 2017-04-19 RX ORDER — FAMOTIDINE 20 MG/1
20 TABLET, FILM COATED ORAL ONCE
Status: COMPLETED | OUTPATIENT
Start: 2017-04-19 | End: 2017-04-19

## 2017-04-19 RX ORDER — FENTANYL CITRATE 50 UG/ML
100 INJECTION, SOLUTION INTRAMUSCULAR; INTRAVENOUS ONCE
Status: COMPLETED | OUTPATIENT
Start: 2017-04-19 | End: 2017-04-19

## 2017-04-19 RX ORDER — CEFAZOLIN SODIUM 2 G/50ML
2 SOLUTION INTRAVENOUS ONCE
Status: COMPLETED | OUTPATIENT
Start: 2017-04-19 | End: 2017-04-19

## 2017-04-19 RX ORDER — SODIUM CHLORIDE 0.9 % (FLUSH) 0.9 %
5-10 SYRINGE (ML) INJECTION EVERY 8 HOURS
Status: DISCONTINUED | OUTPATIENT
Start: 2017-04-19 | End: 2017-04-19 | Stop reason: HOSPADM

## 2017-04-19 RX ORDER — LIDOCAINE HYDROCHLORIDE 20 MG/ML
INJECTION, SOLUTION EPIDURAL; INFILTRATION; INTRACAUDAL; PERINEURAL AS NEEDED
Status: DISCONTINUED | OUTPATIENT
Start: 2017-04-19 | End: 2017-04-19 | Stop reason: HOSPADM

## 2017-04-19 RX ORDER — DEXTROSE 50 % IN WATER (D50W) INTRAVENOUS SYRINGE
25-50 AS NEEDED
Status: DISCONTINUED | OUTPATIENT
Start: 2017-04-19 | End: 2017-04-19 | Stop reason: HOSPADM

## 2017-04-19 RX ORDER — PROMETHAZINE HYDROCHLORIDE 25 MG/ML
12.5 INJECTION, SOLUTION INTRAMUSCULAR; INTRAVENOUS
Status: COMPLETED | OUTPATIENT
Start: 2017-04-19 | End: 2017-04-19

## 2017-04-19 RX ORDER — ONDANSETRON 2 MG/ML
INJECTION INTRAMUSCULAR; INTRAVENOUS AS NEEDED
Status: DISCONTINUED | OUTPATIENT
Start: 2017-04-19 | End: 2017-04-19 | Stop reason: HOSPADM

## 2017-04-19 RX ORDER — SUCCINYLCHOLINE CHLORIDE 20 MG/ML
INJECTION INTRAMUSCULAR; INTRAVENOUS AS NEEDED
Status: DISCONTINUED | OUTPATIENT
Start: 2017-04-19 | End: 2017-04-19 | Stop reason: HOSPADM

## 2017-04-19 RX ORDER — PROPOFOL 10 MG/ML
INJECTION, EMULSION INTRAVENOUS AS NEEDED
Status: DISCONTINUED | OUTPATIENT
Start: 2017-04-19 | End: 2017-04-19 | Stop reason: HOSPADM

## 2017-04-19 RX ORDER — NALOXONE HYDROCHLORIDE 0.4 MG/ML
0.1 INJECTION, SOLUTION INTRAMUSCULAR; INTRAVENOUS; SUBCUTANEOUS AS NEEDED
Status: DISCONTINUED | OUTPATIENT
Start: 2017-04-19 | End: 2017-04-19 | Stop reason: HOSPADM

## 2017-04-19 RX ORDER — FENTANYL CITRATE 50 UG/ML
50 INJECTION, SOLUTION INTRAMUSCULAR; INTRAVENOUS
Status: DISCONTINUED | OUTPATIENT
Start: 2017-04-19 | End: 2017-04-19 | Stop reason: HOSPADM

## 2017-04-19 RX ADMIN — ROCURONIUM BROMIDE 20 MG: 10 INJECTION, SOLUTION INTRAVENOUS at 08:10

## 2017-04-19 RX ADMIN — ROPIVACAINE HYDROCHLORIDE 30 MG: 5 INJECTION, SOLUTION EPIDURAL; INFILTRATION; PERINEURAL at 07:26

## 2017-04-19 RX ADMIN — FENTANYL CITRATE 50 MCG: 50 INJECTION INTRAMUSCULAR; INTRAVENOUS at 07:24

## 2017-04-19 RX ADMIN — CEFAZOLIN SODIUM 2 G: 2 SOLUTION INTRAVENOUS at 08:01

## 2017-04-19 RX ADMIN — ONDANSETRON 4 MG: 2 INJECTION INTRAMUSCULAR; INTRAVENOUS at 09:41

## 2017-04-19 RX ADMIN — SUCCINYLCHOLINE CHLORIDE 120 MG: 20 INJECTION INTRAMUSCULAR; INTRAVENOUS at 07:52

## 2017-04-19 RX ADMIN — GLYCOPYRROLATE 0.4 MG: 0.2 INJECTION INTRAMUSCULAR; INTRAVENOUS at 09:45

## 2017-04-19 RX ADMIN — MIDAZOLAM HYDROCHLORIDE 2 MG: 1 INJECTION, SOLUTION INTRAMUSCULAR; INTRAVENOUS at 07:24

## 2017-04-19 RX ADMIN — LIDOCAINE HYDROCHLORIDE 80 MG: 20 INJECTION, SOLUTION EPIDURAL; INFILTRATION; INTRACAUDAL; PERINEURAL at 07:50

## 2017-04-19 RX ADMIN — PROPOFOL 30 MG: 10 INJECTION, EMULSION INTRAVENOUS at 09:38

## 2017-04-19 RX ADMIN — FENTANYL CITRATE 100 MCG: 50 INJECTION, SOLUTION INTRAMUSCULAR; INTRAVENOUS at 07:47

## 2017-04-19 RX ADMIN — PROMETHAZINE HYDROCHLORIDE 12.5 MG: 25 INJECTION, SOLUTION INTRAMUSCULAR; INTRAVENOUS at 10:46

## 2017-04-19 RX ADMIN — Medication 3 MG: at 09:45

## 2017-04-19 RX ADMIN — FAMOTIDINE 20 MG: 20 TABLET, FILM COATED ORAL at 06:30

## 2017-04-19 RX ADMIN — SODIUM CHLORIDE, SODIUM LACTATE, POTASSIUM CHLORIDE, AND CALCIUM CHLORIDE 75 ML/HR: 600; 310; 30; 20 INJECTION, SOLUTION INTRAVENOUS at 06:26

## 2017-04-19 RX ADMIN — PROPOFOL 150 MG: 10 INJECTION, EMULSION INTRAVENOUS at 07:51

## 2017-04-19 NOTE — OP NOTES
[address]    Name:  Terrence Barker  MR#:  [id number]  :  1945  Account #:  [de-identified]  Date of Adm:  2017  Date of Surgery:  2017      PREOPERATIVE DIAGNOSIS: Rotator cuff tear, left shoulder. POSTOPERATIVE DIAGNOSIS: Rotator cuff tear, subscapularis tear,  left shoulder. PROCEDURES PERFORMED  1. Arthroscopic rotator cuff repair. 2. Arthroscopic subscapularis repair. 3. Arthroscopic acromioplasty, left shoulder. SURGEON: Shanae Barajas MD.    COMPLICATIONS: None. SPECIMENS REMOVED: None. FINDINGS: As above. ANESTHESIA: General.    ESTIMATED BLOOD LOSS: minimal    BRIEF HISTORY: The patient has had significant amount of problems  with the left shoulder, no response to conservative treatment, was  consented for surgery after having discussed at length possible risks  and complications of surgery including infection, bleeding, recurrence  of pain, among other possible problems. DESCRIPTION OF PROCEDURE: The patient was taken to the  operating room, induced under general endotracheal anesthesia by the  anesthesia staff, placed in the standard lateral decubitus position, left  arm was prepped with DuraPrep solution and draped as a free sterile  field. A posterior portal was used for arthroscopy portal. Portals were  made with 11 blade followed by blunt trocars. Once the arthroscope  was in place, the shoulder was inspected. Diffuse degenerative change  in the glenohumeral joint was present. Some fraying of the biceps  tendon was debrided to a stable base involving 25% of the biceps. The  subscapularis superior third was detached from bonefor which an anterior inferior canula  was placed and through this, the bony bed from the lesser tuberosity  was prepared to bleeding bone with a shaver. FiberTape suture was  passed through tissue with a Scorpion passer and loaded into a  SwiveLock which was impacted with stable repair. Attention was then  placed to subacromial space where very prominent bursa and the  undersurface of the acromion was found and was debrided using  shaver and cautery unit. A crescent shaped 3-4 cm tear was present. Bony bed dusted off with the shaver to bleeding bone, 2 FiberTape  sutures were placed at the edge of the articular surface. With  FiberTape, a mattress suture was passed through rotator cuff tissue  with a Scorpion passer. One limb of each FiberTape loaded into a  SwiveLock which was impacted laterally. Double pulley technique used  to suture the mattress sutures, subcutaneous tissue was closed with 3-  0 Vicryl and the skin with 4-0 Monocryl. Sterile dressings were applied. The patient tolerated the procedure well and was taken to recovery  room without problems.         MD HU Klein / Ilana Hayes  D:  04/19/2017   10:55  T:  04/19/2017   12:14  Job #:  [id number]

## 2017-04-19 NOTE — IP AVS SNAPSHOT
[address] [place] [address] 
[phone] Patient: Mark Fitzpatrick MRN: [id number] WRA:[id number] You are allergic to the following Allergen Reactions Macrobid (Nitrofurantoin Monohyd/M-Cryst) Anaphylaxis Aspirin Unknown (comments) Cannot take due to gastric bypass. Lisinopril Cough Parafon Forte Dsc (Chlorzoxazone) Other (comments) Excessive drowsiness Recent Documentation Height Weight BMI OB Status Smoking Status 1.626 m 104.3 kg 39.48 kg/m2 Hysterectomy Former Smoker Emergency Contacts Name Discharge Info Relation Home Work Mobile Wendy Albino DISCHARGE CAREGIVER [3] Spouse [3] [phone] KenneyHemal vernon  Spouse [3] [phone] About your hospitalization You were admitted on:  April 19, 2017 You last received care in the:  [facility] PACU You were discharged on:  April 19, 2017 Unit phone number:  [phone] Why you were hospitalized Your primary diagnosis was:  Not on File Providers Seen During Your Hospitalizations Provider Role Specialty Primary office phone Dianne Briones MD Attending Provider Orthopedic Surgery [phone] Your Primary Care Physician (PCP) Primary Care Physician Office Phone Office Fax [facility] [phone] Follow-up Information Follow up With Details Comments Contact Info Erika Markham MD   [address] [facility] [address] 200 [facility] 
[phone] Dianne Briones MD Schedule an appointment as soon as possible for a visit in 1 week(s)  [address] [place] [address] [address] 200 [facility] 
[phone] Your Appointments Thursday April 20, 2017 10:00 AM EDT  
pt eval with Tristan Hill, PT  
[address] [place] [address] [place] [address] [facility]  
[phone] Monday April 24, 2017  1:00 PM EDT  
POST OP with Azael Parish MD  
[address] [facility], [address] [facility] [place] ([facility] [place] 200 [facility]  
[phone] Wednesday May 24, 2017  9:15 AM EDT  
LAB with [facility] NURSE VISIT Internist of [facility] ([facility]) [address] [facility] 200 [facility]  
[phone] Wednesday May 31, 2017 10:00 AM EDT PHYSICAL with Lisa Musa MD  
Internist of [address] [place] [address] [address] [facility] 200 [facility]  
[phone] Current Discharge Medication List  
  
CONTINUE these medications which have NOT CHANGED Dose & Instructions Dispensing Information Comments Morning Noon Evening Bedtime  
 albuterol 90 mcg/actuation inhaler Commonly known as:  PROVENTIL HFA, VENTOLIN HFA, PROAIR HFA Your last dose was: Your next dose is:    
   
   
 Dose:  1-2 Puff Take 1-2 Puffs by inhalation every four (4) hours as needed for Wheezing. Quantity:  1 Inhaler Refills:  3  
     
   
   
   
  
 cephALEXin 500 mg capsule Commonly known as:  Luz Maizes Your last dose was: Your next dose is: Take one capsule by mouth three times daily. START TAKING MEDICATION THREE DAYS PRIOR TO PROCEDURE. Quantity:  21 Cap Refills:  0  
     
   
   
   
  
 cranberry extract 450 mg Tab Your last dose was: Your next dose is: Take  by mouth. Refills:  0  
     
   
   
   
  
 cyclobenzaprine 10 mg tablet Commonly known as:  FLEXERIL Your last dose was: Your next dose is:    
   
   
 Dose:  10 mg Take 1 Tab by mouth three (3) times daily as needed for Muscle Spasm(s). Quantity:  30 Tab Refills:  0  
     
   
   
   
  
 diazePAM 2 mg tablet Commonly known as:  VALIUM Your last dose was: Your next dose is: Dose:  2 mg Take 2 mg by mouth as needed. Refills:  0  
     
   
   
   
  
 fluticasone-salmeterol 250-50 mcg/dose diskus inhaler Commonly known as:  ADVAIR DISKUS Your last dose was: Your next dose is:    
   
   
 Dose:  1 Puff Take 1 Puff by inhalation every twelve (12) hours. Quantity:  1 Inhaler Refills:  3  
     
   
   
   
  
 hydroCHLOROthiazide 25 mg tablet Commonly known as:  HYDRODIURIL Your last dose was: Your next dose is:    
   
   
 Dose:  25 mg Take 1 Tab by mouth daily. Quantity:  90 Tab Refills:  1  
     
   
   
   
  
 inhalational spacing device Your last dose was: Your next dose is:    
   
   
 Dose:  1 Each  
1 Each by Does Not Apply route as needed. Quantity:  1 Device Refills:  0  
     
   
   
   
  
 losartan 25 mg tablet Commonly known as:  COZAAR Your last dose was: Your next dose is:    
   
   
 Dose:  25 mg Take 1 Tab by mouth daily. Quantity:  90 Tab Refills:  3 MIRALAX PO Your last dose was: Your next dose is: Take  by mouth nightly. Refills:  0  
     
   
   
   
  
 ondansetron hcl 4 mg tablet Commonly known as:  Alejandra Michael Your last dose was: Your next dose is:    
   
   
 Dose:  4 mg Take 1 Tab by mouth every eight (8) hours as needed. Quantity:  40 Tab Refills:  1 OS- + D PO Your last dose was: Your next dose is:    
   
   
 Dose:  1 Cap Take 1 Cap by mouth two (2) times a day. Refills:  0  
     
   
   
   
  
 oxyCODONE-acetaminophen  mg per tablet Commonly known as:  PERCOCET Your last dose was: Your next dose is:    
   
   
 Dose:  1-2 Tab Take 1-2 Tabs by mouth every four (4) hours as needed for Pain. Max Daily Amount: 12 Tabs. Do not take until after surgery Quantity:  60 Tab Refills:  0 PriLOSEC 20 mg capsule Generic drug:  omeprazole Your last dose was: Your next dose is:    
   
   
 Dose:  20 mg Take 20 mg by mouth daily. Refills:  0  
     
   
   
   
  
 simvastatin 20 mg tablet Commonly known as:  ZOCOR Your last dose was: Your next dose is: TAKE ONE TABLET BY MOUTH IN THE EVENING Quantity:  90 Tab Refills:  3  
     
   
   
   
  
 traMADol 50 mg tablet Commonly known as:  ULTRAM  
   
Your last dose was: Your next dose is:    
   
   
 Dose:  50 mg Take 1 Tab by mouth every six (6) hours as needed for Pain. Quantity:  50 Tab Refills:  0  
     
   
   
   
  
 VITAMIN B-12 500 mcg tablet Generic drug:  cyanocobalamin Your last dose was: Your next dose is:    
   
   
 Dose:  500 mcg Take 500 mcg by mouth daily. Refills:  0  
     
   
   
   
  
 VITAMIN D3 1,000 unit tablet Generic drug:  cholecalciferol Your last dose was: Your next dose is:    
   
   
 Dose:  1000 Units Take 1,000 Units by mouth two (2) times a day. Refills:  0  
     
   
   
   
  
 zafirlukast 20 mg tablet Commonly known as:  Yoan Villanueva Your last dose was: Your next dose is:    
   
   
 Dose:  20 mg Take 1 Tab by mouth two (2) times a day. Quantity:  60 Tab Refills:  3 Discharge Instructions Dr. Malina Villatoro Shoulder Arthroscopy Postoperative Information You will be given a prescription for pain medication. It may be taken every 4-6 hours as needed for the first few days after surgery. You should place an ice bag over your shoulder to help with pain and reduce swelling. A soft bandage was placed on your shoulder. You may take the bandage off two days after surgery and clean the incision sites with peroxide. Band-aids should be placed over each incision site for at least four days. There are 2 or 3 small incisions in your shoulder and they may be sore and develop bruising over the next several days. The bruising should resolve and no special care will be needed. It is safe to take a shower or bathe two days after surgery. You will be given a sling to use. Continue to wear this at all times, unless you are given different instructions. No moving the arm away from your body on your own, reaching or carrying with the operated arm. You will be shown specific exercises when you see your physical therapist tomorrow. Even though your incisions are small, there has been an operation inside and around the shoulder joint. Complete healing may take weeks or several months. If you have a high temperature, unexpected pain, redness or swelling in your shoulder please contact my office immediately. Please call to make an appointment to see me in my office in 1 week. Dr. Mark Hernandez office number [phone] DISCHARGE SUMMARY from Nurse The following personal items are in your possession at time of discharge: 
 
Dental Appliances: None Visual Aid: Glasses Home Medications: None Jewelry: Ring, With patient Clothing: Jacket/Coat, Pants, Shirt, Undergarments, Footwear (With  Sherwin Villegas) Other Valuables: Eyeglasses (With  Sherwin Villegas) PATIENT INSTRUCTIONS: 
 
 
F-face looks uneven A-arms unable to move or move unevenly S-speech slurred or non-existent T-time-call 911 as soon as signs and symptoms begin-DO NOT go Back to bed or wait to see if you get better-TIME IS BRAIN. Warning Signs of HEART ATTACK Call 911 if you have these symptoms: 
? Chest discomfort. Most heart attacks involve discomfort in the center of the chest that lasts more than a few minutes, or that goes away and comes back. It can feel like uncomfortable pressure, squeezing, fullness, or pain. ? Discomfort in other areas of the upper body. Symptoms can include pain or discomfort in one or both arms, the back, neck, jaw, or stomach. ? Shortness of breath with or without chest discomfort. ? Other signs may include breaking out in a cold sweat, nausea, or lightheadedness. Don't wait more than five minutes to call [address]! Fast action can save your life. Calling 911 is almost always the fastest way to get lifesaving treatment. Emergency Medical Services staff can begin treatment when they arrive  up to an hour sooner than if someone gets to the hospital by car. The discharge information has been reviewed with the patient and spouse. The patient and spouse verbalized understanding. Discharge medications reviewed with the patient and spouse and appropriate educational materials and side effects teaching were provided. Discharge Orders None Introducing [facility]! Neelima Ortega introduces [employer] patient portal. Now you can access parts of your medical record, email your doctor's office, and request medication refills online. 1. In your internet browser, go to [email or web address] 2. Click on the First Time User? Click Here link in the Sign In box. You will see the New Member Sign Up page. 3. Enter your [employer] Access Code exactly as it appears below. You will not need to use this code after youve completed the sign-up process. If you do not sign up before the expiration date, you must request a new code. · [employer] Access Code: [email or web address] Expires: 6/18/2017  2:06 PM 
 
4. Enter the last four digits of your Social Security Number (xxxx) and Date of Birth (mm/dd/yyyy) as indicated and click Submit. You will be taken to the next sign-up page. 5. Create a [employer] ID. This will be your [employer] login ID and cannot be changed, so think of one that is secure and easy to remember. 6. Create a [employer] password. You can change your password at any time. 7. Enter your Password Reset Question and Answer. This can be used at a later time if you forget your password. 8. Enter your e-mail address. You will receive e-mail notification when new information is available in [address] 9. Click Sign Up. You can now view and download portions of your medical record. 10. Click the Download Summary menu link to download a portable copy of your medical information. If you have questions, please visit the Frequently Asked Questions section of the [employer] website. Remember, [employer] is NOT to be used for urgent needs. For medical emergencies, dial 911. Now available from your iPhone and Android! General Information Please provide this summary of care documentation to your next provider. Patient Signature:  ____________________________________________________________ Date:  ____________________________________________________________  
  
Lemuel Rutledgeiter Provider Signature:  ____________________________________________________________ Date:  ____________________________________________________________

## 2017-04-19 NOTE — ANESTHESIA POSTPROCEDURE EVALUATION
Post-Anesthesia Evaluation and Assessment    Patient: Daljit Walters MRN: [id number]  SSN: [id number]    YOB: 1945  Age: 70 y.o. Sex: female       Cardiovascular Function/Vital Signs  Visit Vitals    /56    Pulse 65    Temp 36.3 °C (97.4 °F)    Resp 17    Ht 5' 4\" (1.626 m)    Wt 104.3 kg (230 lb)    SpO2 97%    BMI 39.48 kg/m2       Patient is status post general, regional anesthesia for Procedure(s):  LEFT SHOULDER ARTHROSCOPIC ROTATOR CUFF REPAIR. Nausea/Vomiting: None    Postoperative hydration reviewed and adequate. Pain:  Pain Scale 1: Numeric (0 - 10) (04/19/17 1037)  Pain Intensity 1: 0 (04/19/17 1037)   Managed    Neurological Status:   Neuro (WDL): Within Defined Limits (04/19/17 0602)   At baseline    Mental Status and Level of Consciousness: Arousable    Pulmonary Status:   O2 Device: Room air (04/19/17 1015)   Adequate oxygenation and airway patent    Complications related to anesthesia: None    Post-anesthesia assessment completed.  No concerns    Signed By: Bryce Hall MD     April 19, 2017

## 2017-04-19 NOTE — PROGRESS NOTES
conducted a pre-surgery visit with Logan Rodas, who is a 70 y.o.,female. The  provided the following Interventions:  Initiated a relationship of care and support. Offered prayer and assurance of continued prayers on patient's behalf. Plan:  Chaplains will continue to follow and will provide pastoral care on an as needed/requested basis.  recommends bedside caregivers page  on duty if patient shows signs of acute spiritual or emotional distress.     [address] [facility] Certified [address] [place] [address]   [phone]

## 2017-04-19 NOTE — BRIEF OP NOTE
BRIEF OPERATIVE NOTE    Date of Procedure: 4/19/2017   Preoperative Diagnosis: Complete tear of left rotator cuff [M75.122]  Postoperative Diagnosis: Complete tear of left rotator cuff [M75.122]    Procedure(s):  LEFT SHOULDER ARTHROSCOPIC ROTATOR CUFF REPAIR, acromioplasty  Surgeon(s) and Role:     * Mosie Dakins, MD - Primary            Surgical Staff:  Circ-1: Brandon Arriola, RN  Circ-Relief: Bertin Parra  Scrub Tech-1: Alma Delia Rose  Surg Asst-1: Adalid Lee  Event Time In   Incision Start 4789   Incision Close 0950     Anesthesia: General   Estimated Blood Loss: minimal  Specimens: * No specimens in log *   Findings: 3cm tear supraspinatous   Complications: none  Implants: * No implants in log *

## 2017-04-19 NOTE — ANESTHESIA PROCEDURE NOTES
Peripheral Block    Start time: 4/19/2017 7:18 AM  End time: 4/19/2017 7:25 AM  Performed by: Saad Nick  Authorized by: Elizabeth Crook       Pre-procedure: Indications: at surgeon's request, post-op pain management and procedure for pain    Preanesthetic Checklist: patient identified, risks and benefits discussed, site marked, timeout performed, anesthesia consent given and patient being monitored      Block Type:   Block Type:  Brachial plexus  Laterality:  Left  Monitoring:  Standard ASA monitoring, continuous pulse ox, frequent vital sign checks, oxygen, responsive to questions and heart rate  Injection Technique:  Single shot  Procedures: ultrasound guided    Prep: chlorhexidine    Location:  Interscalene  Needle Type:  Stimuplex  Needle Gauge:  22 G  Needle Localization:  Ultrasound guidance  Medication Injected:  0.5%  ropivacaine  Volume (mL):  25    Assessment:  Number of attempts:  1  Injection Assessment:  No intravascular symptoms, negative aspiration for blood, local visualized surrounding nerve on ultrasound, ultrasound image on chart, no paresthesia and incremental injection every 5 mL  Patient tolerance:  Patient tolerated the procedure well with no immediate complications  Location:  PREOP HOLDING    Patient given 2 mg IV Versed and 50 mcg IV Fentanyl for sedation.     4/19/2017     7:49 AM     Shalom Be MD

## 2017-04-19 NOTE — PROGRESS NOTES
Date of Surgery Update:  Nick Brown was seen and examined. History and physical has been reviewed. The patient has been examined.  There have been no significant clinical changes since the completion of the originally dated History and Physical.    Signed By: Dayana Valverde MD     April 19, 2017 10:00 AM

## 2017-04-19 NOTE — ANESTHESIA PREPROCEDURE EVALUATION
Anesthetic History   No history of anesthetic complications            Review of Systems / Medical History  Patient summary reviewed and pertinent labs reviewed    Pulmonary            Asthma : well controlled       Neuro/Psych   Within defined limits           Cardiovascular    Hypertension              Exercise tolerance: >4 METS     GI/Hepatic/Renal     GERD           Endo/Other      Hypothyroidism  Morbid obesity     Other Findings   Comments:   Risk Factors for Postoperative nausea/vomiting:       History of postoperative nausea/vomiting? NO       Female? YES       Motion sickness? NO       Intended opioid administration for postoperative analgesia?   YES           Physical Exam    Airway  Mallampati: II  TM Distance: 4 - 6 cm  Neck ROM: normal range of motion   Mouth opening: Normal     Cardiovascular  Regular rate and rhythm,  S1 and S2 normal,  no murmur, click, rub, or gallop  Rhythm: regular  Rate: normal         Dental    Dentition: Lower dentition intact and Upper dentition intact     Pulmonary  Breath sounds clear to auscultation               Abdominal  GI exam deferred       Other Findings            Anesthetic Plan    ASA: 3  Anesthesia type: general and regional - interscalene block          Induction: Intravenous  Anesthetic plan and risks discussed with: Patient and Spouse

## 2017-04-19 NOTE — PERIOP NOTES
Patient armband removed and shredded    Patient confirmed by two identifiers with discharge instructions prior to being provided to patient and spouse

## 2017-04-20 ENCOUNTER — HOSPITAL ENCOUNTER (OUTPATIENT)
Dept: PHYSICAL THERAPY | Age: 72
Discharge: HOME | End: 2017-04-20
Payer: COMMERCIAL

## 2017-04-20 PROCEDURE — G8984 CARRY CURRENT STATUS: HCPCS

## 2017-04-20 PROCEDURE — G8985 CARRY GOAL STATUS: HCPCS

## 2017-04-20 PROCEDURE — 97140 MANUAL THERAPY 1/> REGIONS: CPT

## 2017-04-20 PROCEDURE — 97110 THERAPEUTIC EXERCISES: CPT

## 2017-04-20 PROCEDURE — 97161 PT EVAL LOW COMPLEX 20 MIN: CPT

## 2017-04-20 NOTE — PROGRESS NOTES
[facility]              [address] [facility]        [place], [address] [place]   [phone] fax    Plan of Care/ Statement of Necessity for Physical Therapy Services    Patient name: Sheng Galeano Start of Care: 2017   Referral source: Jocelynn Louis : 1945    Medical Diagnosis: Complete rotator cuff tear or rupture of left shoulder, not specified as traumatic [M75.122]   Onset Date:DOS: 2017    Treatment Diagnosis: s/p L RC repair   Prior Hospitalization: see medical history Provider#: [id number]   Medications: Verified on Patient summary List    Comorbidities: Arthritis, Asthma, Back pain, BMI over 30, HTN   Prior Level of Function: Pt is retired but (I) with all ADL's and house chores. The Plan of Care and following information is based on the information from the initial evaluation. Assessment/ key information: Pt is a 69 y/o female who presents s/p L RC repair on 2017. Pt denies one specific MELINDA, she just thinks it was from wear and tear but started having pain in Sept that got to the point where she could barely use her L arm. She went to a couple different MD's and eventually an MRI was done revealing tear. Pt denies any post op complications and reports her nerve block has worn off. Pt presents with sling and abduction pillow on L shoulder. Pt presents with bandage still on L shoulder and was instructed to remove tomorrow. Pt's elbow AAROM WNL and wrist AROM WNL. Pt's PROM shoulder flex 30 deg with pain. Patient will benefit from skilled PT services to modify and progress therapeutic interventions, address functional mobility deficits, address ROM deficits, address strength deficits, analyze and address soft tissue restrictions, analyze and cue movement patterns, assess and modify postural abnormalities and instruct in home and community integration to attain remaining goals.     Evaluation Complexity History MEDIUM Complexity : 1-2 comorbidities / personal factors will impact the outcome/ POC ; Examination MEDIUM Complexity : 3 Standardized tests and measures addressing body structure, function, activity limitation and / or participation in recreation  ;Presentation LOW Complexity : Stable, uncomplicated  ;Clinical Decision Making HIGH Complexity : FOTO score of 1- 25   Overall Complexity Rating: LOW   Problem List: pain affecting function, decrease ROM, decrease strength, edema affecting function, decrease ADL/ functional abilitiies, decrease activity tolerance and decrease flexibility/ joint mobility   Treatment Plan may include any combination of the following: Therapeutic exercise, Therapeutic activities, Neuromuscular re-education, Physical agent/modality, Manual therapy and Patient education  Patient / Family readiness to learn indicated by: asking questions, trying to perform skills and interest  Persons(s) to be included in education: patient (P)  Barriers to Learning/Limitations: None  Patient Goal (s): Be able to use arm again  Patient Self Reported Health Status: fair  Rehabilitation Potential: good    Short Term Goals: To be accomplished in 2 weeks:  1) Pt will report (I) and compliance with HEP for home management of symptoms. 2) Pt will improve L shoulder PROM flex/scap to 90 deg in order to perform ADL's. Long Term Goals: To be accomplished in 4 weeks:  1) Pt's FOTO score will improve > or = 59 indicating improvements in function. 2) Pt will improve L shoulder PROM flex > or = 140 deg for ease with ADL's.  3) Pt will improve L shoulder PROM IR > or = 50 deg for ease with ADL's  4) Pt will demo L shoulder AAROM flex/scap > or = 90 deg for ease with ADL's. Frequency / Duration: Patient to be seen 3 times per week for 4 weeks.     Patient/ Caregiver education and instruction: Diagnosis, prognosis, brace/ splint application and exercises   [x]  Plan of care has been reviewed with PTA    G-Codes (GP)  Carry   Current  CL= 60-79%    Goal  CK= 40-59%    The severity rating is based on clinical judgment and the FOTO score. Lilibeth Randle, PT 4/20/2017 10:44 AM  ________________________________________________________________________    I certify that the above Therapy Services are being furnished while the patient is under my care. I agree with the treatment plan and certify that this therapy is necessary.     [de-identified] Signature:____________________  Date:____________Time: _________    Please sign and return to [facility] [address]        [place], [facility] [address]  [phone] fax

## 2017-04-20 NOTE — PROGRESS NOTES
PT DAILY TREATMENT NOTE     Patient Name: Ayesha Simeon  Date:2017  : 1945  [x]  Patient  Verified  Payor: [place] / Plan: [place] MED[place]ARE PART A / Product Type: Medicare /    In time:10:03  Out time:10:40  Total Treatment Time (min): 37  Visit #: 1 of 12    Treatment Area: Complete rotator cuff tear or rupture of left shoulder, not specified as traumatic [M75.122]    SUBJECTIVE  Pain Level (0-10 scale): 1  Any medication changes, allergies to medications, adverse drug reactions, diagnosis change, or new procedure performed?: [x] No    [] Yes (see summary sheet for update)  Subjective functional status/changes:   [] No changes reported  Pt is a 69 y/o female who presents s/p L RC repair on 2017. Pt denies one specific MELINDA, she just thinks it was from wear and tear but started having pain in Sept that got to the point where she could barely use her L arm. She went to a couple different MD's and eventually an MRI was done revealing tear. Pt denies any post op complications and reports her nerve block has worn off.      OBJECTIVE    Modality rationale:  to improve the patients ability to    Min Type Additional Details    [] Estim:  []Unatt       []IFC  []Premod                        []Other:  []w/ice   []w/heat  Position:  Location:    [] Estim: []Att    []TENS instruct  []NMES                    []Other:  []w/US   []w/ice   []w/heat  Position:  Location:    []  Traction: [] Cervical       []Lumbar                       [] Prone          []Supine                       []Intermittent   []Continuous Lbs:  [] before manual  [] after manual    []  Ultrasound: []Continuous   [] Pulsed                           []1MHz   []3MHz W/cm2:  Location:    []  Iontophoresis with dexamethasone         Location: [] Take home patch   [] In clinic    []  Ice     []  heat  []  Ice massage  []  Laser   []  Anodyne Position:  Location:    []  Laser with stim  []  Other:  Position:  Location:    []  Vasopneumatic Device Pressure:       [] lo [] med [] hi   Temperature: [] lo [] med [] hi   [] Skin assessment post-treatment:  []intact []redness- no adverse reaction    []redness  adverse reaction:     21 min [x]Eval                  []Re-Eval       8 min Therapeutic Exercise:  [x] See flow sheet : Instructed, demonstrated, and performed HEP   Rationale: increase ROM and increase strength to improve the patients ability to perform ADL's     min Therapeutic Activity:  []  See flow sheet :   Rationale:   to improve the patients ability to       min Neuromuscular Re-education:  []  See flow sheet :   Rationale:   to improve the patients ability to     8 min Manual Therapy:  Elbow PROM, Gentle GHJ mobs, PROM flex. STJ mobs   Rationale: decrease pain, increase ROM, increase tissue extensibility and decrease trigger points to increase ease with ADL's     min Gait Training:  ___ feet with ___ device on level surfaces with ___ level of assist   Rationale: With   [] TE   [] TA   [] neuro   [] other: Patient Education: [x] Review HEP    [] Progressed/Changed HEP based on:   [] positioning   [] body mechanics   [] transfers   [] heat/ice application    [] other:      Other Objective/Functional Measures: Pt presents with sling and abduction pillow on L shoulder. Pt presents with bandage still on L shoulder and was instructed to remove tomorrow. Pt's elbow AAROM WNL and wrist AROM WNL. Pt's PROM shoulder flex 30 deg with pain. Pain Level (0-10 scale) post treatment: 2    ASSESSMENT/Changes in Function: see POC    Patient will continue to benefit from skilled PT services to modify and progress therapeutic interventions, address functional mobility deficits, address ROM deficits, address strength deficits, analyze and address soft tissue restrictions, analyze and cue movement patterns, assess and modify postural abnormalities and instruct in home and community integration to attain remaining goals.      [x]  See Plan of Care  []  See progress note/recertification  []  See Discharge Summary         Progress towards goals / Updated goals:  Short term goals: to be accomplished in 2 weeks  1) Pt will report (I) and compliance with HEP for home management of symptoms. At eval: Instructed, demonstrated, and performed HEP  2) Pt will improve L shoulder PROM flex/scap to 90 deg in order to perform ADL's. At eval: L shoulder PROM flex 30 deg  Long term goals: to be accomplished in 4 weeks  1) Pt's FOTO score will improve > or = 59 indicating improvements in function. At eval: FOTO = 22  2) Pt will improve L shoulder PROM flex > or = 140 deg for ease with ADL's. At eval: L shoulder PROM flex 30 deg  3) Pt will improve L shoulder PROM IR > or = 50 deg for ease with ADL's  At eval: NT  4) Pt will demo L shoulder AAROM flex/scap > or = 90 deg for ease with ADL's.   At eval: NT    PLAN  []  Upgrade activities as tolerated     []  Continue plan of care  []  Update interventions per flow sheet       []  Discharge due to:_  [x]  Other: 3x/week for 4 weeks      Josue Otto PT 4/20/2017  10:43 AM    Future Appointments  Date Time Provider Pat Manueli   4/21/2017 8:30 AM Josue Otto PT [facility]   4/24/2017 8:30 AM Vinny Orr, PTA [facility]   4[facility]24[facility]2017 1:00 [facility]   4/26/2017 10:00 AM Vinny Orr PTA [facility]   4/28/2017 10:30 AM Mali Head, [facility]   5/1/2017 9:00 AM Malijob Head, PTA [facility]   5/3/2017 9:00 AM Malijob Head, PTA [facility]   5/5/2017 9:00 AM Malijob Head, PTA [facility]   5/8/2017 9:00 AM Mali E Laws, PTA [facility]   5/10/2017 8:30 AM Mali E Laws, PTA [facility]   5/12/2017 9:30 AM Josue Otto, PT [facility]   5/15/2017 9:30 AM Mali E Laws, PTA [facility]   5/17/2017 9:00 AM Mali E Laws, PTA [facility]   5/19/2017 9:30 AM Mali E Laws, PTA [facility]   5/24/2017 9:15 AM [facility] NURSE VISIT [facility]   5/31/2017 10:00 AM Cesar Becerril, MD [facility]

## 2017-04-21 ENCOUNTER — HOSPITAL ENCOUNTER (OUTPATIENT)
Dept: PHYSICAL THERAPY | Age: 72
Discharge: HOME | End: 2017-04-21
Payer: COMMERCIAL

## 2017-04-21 PROCEDURE — 97110 THERAPEUTIC EXERCISES: CPT

## 2017-04-21 PROCEDURE — 97140 MANUAL THERAPY 1/> REGIONS: CPT

## 2017-04-21 NOTE — PROGRESS NOTES
PT DAILY TREATMENT NOTE     Patient Name: Henna Sinclair  Date:2017  : 1945  [x]  Patient  Verified  Payor: BLUE CROSS / Plan: [employer] [facility] / Product Type: PPO /    In time:8:28  Out time:9:00  Total Treatment Time (min): 32  Visit #: 2 of 12    Treatment Area: Complete rotator cuff tear or rupture of left shoulder, not specified as traumatic [M75.122]    SUBJECTIVE  Pain Level (0-10 scale): 2  Any medication changes, allergies to medications, adverse drug reactions, diagnosis change, or new procedure performed?: [x] No    [] Yes (see summary sheet for update)  Subjective functional status/changes:   [] No changes reported  Pt reports compliance with HEP.      OBJECTIVE    Modality rationale:  to improve the patients ability to    Min Type Additional Details    [] Estim:  []Unatt       []IFC  []Premod                        []Other:  []w/ice   []w/heat  Position:  Location:    [] Estim: []Att    []TENS instruct  []NMES                    []Other:  []w/US   []w/ice   []w/heat  Position:  Location:    []  Traction: [] Cervical       []Lumbar                       [] Prone          []Supine                       []Intermittent   []Continuous Lbs:  [] before manual  [] after manual    []  Ultrasound: []Continuous   [] Pulsed                           []1MHz   []3MHz W/cm2:  Location:    []  Iontophoresis with dexamethasone         Location: [] Take home patch   [] In clinic    []  Ice     []  heat  []  Ice massage  []  Laser   []  Anodyne Position:  Location:    []  Laser with stim  []  Other:  Position:  Location:    []  Vasopneumatic Device Pressure:       [] lo [] med [] hi   Temperature: [] lo [] med [] hi   [] Skin assessment post-treatment:  []intact []redness- no adverse reaction    []redness  adverse reaction:      min []Eval                  []Re-Eval       17 min Therapeutic Exercise:  [x] See flow sheet :   Rationale: increase ROM, increase strength and improve coordination to improve the patients ability to decrease pain and perform ADL's     min Therapeutic Activity:  []  See flow sheet :   Rationale:   to improve the patients ability to       min Neuromuscular Re-education:  []  See flow sheet :   Rationale:   to improve the patients ability to     15 min Manual Therapy:  Per flow sheet   Rationale: decrease pain, increase ROM, increase tissue extensibility and decrease trigger points to increase ease with ADL's     min Gait Training:  ___ feet with ___ device on level surfaces with ___ level of assist   Rationale: With   [] TE   [] TA   [] neuro   [] other: Patient Education: [x] Review HEP    [] Progressed/Changed HEP based on:   [] positioning   [] body mechanics   [] transfers   [] heat/ice application    [] other:      Other Objective/Functional Measures:      Pain Level (0-10 scale) post treatment: 4    ASSESSMENT/Changes in Function: cont per POC    Patient will continue to benefit from skilled PT services to modify and progress therapeutic interventions, address functional mobility deficits, address ROM deficits, address strength deficits, analyze and address soft tissue restrictions, analyze and cue movement patterns, assess and modify postural abnormalities and instruct in home and community integration to attain remaining goals. []  See Plan of Care  []  See progress note/recertification  []  See Discharge Summary         Progress towards goals / Updated goals:  Short term goals: to be accomplished in 2 weeks  1) Pt will report (I) and compliance with HEP for home management of symptoms. At eval: Instructed, demonstrated, and performed HEP  Current: Goal met per pt report (4/21/17)  2) Pt will improve L shoulder PROM flex/scap to 90 deg in order to perform ADL's. At eval: L shoulder PROM flex 30 deg  Long term goals: to be accomplished in 4 weeks  1) Pt's FOTO score will improve > or = 59 indicating improvements in function.   At eval: FOTO = 22  2) Pt will improve L shoulder PROM flex > or = 140 deg for ease with ADL's. At eval: L shoulder PROM flex 30 deg  3) Pt will improve L shoulder PROM IR > or = 50 deg for ease with ADL's  At eval: NT  4) Pt will demo L shoulder AAROM flex/scap > or = 90 deg for ease with ADL's.   At eval: NT    PLAN  [x]  Upgrade activities as tolerated     [x]  Continue plan of care  []  Update interventions per flow sheet       []  Discharge due to:_  []  Other:_      Faizan Yuan, PT 4/21/2017  8:38 AM    Future Appointments  Date Time Provider Pat Connie   4/24/2017 8:30 AM Audrey Willis, PTA [facility]   4/24/2017 1:00 PM MD MATT RodriguesMD [facility]IN [facility]   4/26/2017 10:00 AM Audrey Willis, PTA [facility]   4/28/2017 10:30 AM Mali E Laws, PTA [facility]   5/1/2017 9:00 AM Mali E Laws, PTA [facility]   5/3/2017 9:00 AM Mali E Laws, PTA [facility]   5/5/2017 9:00 AM Mali E Laws, PTA [facility]   5/8/2017 9:00 AM Mali E Laws, PTA [facility]   5/10/2017 8:30 AM Mali E Laws, PTA [facility]   5/12/2017 9:30 AM Faizan Yuan, PT [facility]   5/15/2017 9:30 AM Mali E Laws, PTA [facility]   5/17/2017 9:00 AM Mali E Laws, PTA [facility]   5/19/2017 9:30 AM Mali E Laws, PTA [facility]   5/24/2017 9:15 [facility]   5/31/2017 10:00 AM Nimisha Peguero MD [facility]

## 2017-04-24 ENCOUNTER — OFFICE VISIT (OUTPATIENT)
Dept: ORTHOPEDIC SURGERY | Age: 72
End: 2017-04-24

## 2017-04-24 ENCOUNTER — HOSPITAL ENCOUNTER (OUTPATIENT)
Dept: PHYSICAL THERAPY | Age: 72
Discharge: HOME | End: 2017-04-24
Payer: COMMERCIAL

## 2017-04-24 VITALS
WEIGHT: 233 LBS | HEIGHT: 64 IN | HEART RATE: 76 BPM | SYSTOLIC BLOOD PRESSURE: 127 MMHG | DIASTOLIC BLOOD PRESSURE: 49 MMHG | BODY MASS INDEX: 39.78 KG/M2

## 2017-04-24 DIAGNOSIS — M75.122 COMPLETE TEAR OF LEFT ROTATOR CUFF: Primary | ICD-10-CM

## 2017-04-24 PROCEDURE — 97140 MANUAL THERAPY 1/> REGIONS: CPT

## 2017-04-24 PROCEDURE — 97110 THERAPEUTIC EXERCISES: CPT

## 2017-04-24 NOTE — PROGRESS NOTES
PT DAILY TREATMENT NOTE     Patient Name: Severo Mar  Date:2017  : 1945  [x]  Patient  Verified  Payor: BLUE CROSS / Plan: [facility] [place] / Product Type: PPO /    In time:8:30  Out time:9:01  Total Treatment Time (min): 31  Visit #: 3 of 12    Treatment Area: Complete rotator cuff tear or rupture of left shoulder, not specified as traumatic [M75.122]    SUBJECTIVE  Pain Level (0-10 scale): 3  Any medication changes, allergies to medications, adverse drug reactions, diagnosis change, or new procedure performed?: [x] No    [] Yes (see summary sheet for update)  Subjective functional status/changes:   [] No changes reported  Pt reports she is a little sore today, she thinks it is due to rain.      OBJECTIVE    Modality rationale:  to improve the patients ability to    Min Type Additional Details    [] Estim:  []Unatt       []IFC  []Premod                        []Other:  []w/ice   []w/heat  Position:  Location:    [] Estim: []Att    []TENS instruct  []NMES                    []Other:  []w/US   []w/ice   []w/heat  Position:  Location:    []  Traction: [] Cervical       []Lumbar                       [] Prone          []Supine                       []Intermittent   []Continuous Lbs:  [] before manual  [] after manual    []  Ultrasound: []Continuous   [] Pulsed                           []1MHz   []3MHz W/cm2:  Location:    []  Iontophoresis with dexamethasone         Location: [] Take home patch   [] In clinic    []  Ice     []  heat  []  Ice massage  []  Laser   []  Anodyne Position:  Location:    []  Laser with stim  []  Other:  Position:  Location:    []  Vasopneumatic Device Pressure:       [] lo [] med [] hi   Temperature: [] lo [] med [] hi   [] Skin assessment post-treatment:  []intact []redness- no adverse reaction    []redness  adverse reaction:      min []Eval                  []Re-Eval       16 min Therapeutic Exercise:  [x] See flow sheet :   Rationale: increase ROM, increase strength and improve coordination to improve the patients ability to perform ADL's     min Therapeutic Activity:  []  See flow sheet :   Rationale:   to improve the patients ability to       min Neuromuscular Re-education:  []  See flow sheet :   Rationale:   to improve the patients ability to     15 min Manual Therapy:  Per flow sheet   Rationale: decrease pain, increase ROM, increase tissue extensibility and decrease trigger points to increase ease with ADL's     min Gait Training:  ___ feet with ___ device on level surfaces with ___ level of assist   Rationale: With   [] TE   [] TA   [] neuro   [] other: Patient Education: [x] Review HEP    [] Progressed/Changed HEP based on:   [] positioning   [] body mechanics   [] transfers   [] heat/ice application    [] other:      Other Objective/Functional Measures:    Pain Level (0-10 scale) post treatment: 3    ASSESSMENT/Changes in Function: cont per POC    Patient will continue to benefit from skilled PT services to modify and progress therapeutic interventions, address functional mobility deficits, address ROM deficits, address strength deficits, analyze and address soft tissue restrictions, analyze and cue movement patterns, assess and modify postural abnormalities and instruct in home and community integration to attain remaining goals. []  See Plan of Care  []  See progress note/recertification  []  See Discharge Summary         Progress towards goals / Updated goals:  Short term goals: to be accomplished in 2 weeks  1) Pt will report (I) and compliance with HEP for home management of symptoms. At eval: Instructed, demonstrated, and performed HEP  Current: Goal met per pt report (4/21/17)  2) Pt will improve L shoulder PROM flex/scap to 90 deg in order to perform ADL's. At eval: L shoulder PROM flex 30 deg  Long term goals: to be accomplished in 4 weeks  1) Pt's FOTO score will improve > or = 59 indicating improvements in function.   At eval: FOTO = 22  2) Pt will improve L shoulder PROM flex > or = 140 deg for ease with ADL's. At eval: L shoulder PROM flex 30 deg  3) Pt will improve L shoulder PROM IR > or = 50 deg for ease with ADL's  At eval: NT  4) Pt will demo L shoulder AAROM flex/scap > or = 90 deg for ease with ADL's.   At eval: NT     PLAN  [x]  Upgrade activities as tolerated     [x]  Continue plan of care  []  Update interventions per flow sheet       []  Discharge due to:_  []  Other:_      Faizan Yuan, PT 4/24/2017  8:40 AM    Future Appointments  Date Time Provider Pat Manueli   4/24/2017 1:00 PM MD TERESITA Rodrigues   4/26/2017 10:00 AM Audrey Willis, PTA [facility]   4/28/2017 10:30 AM Mali E Laws, PTA [facility]   5/1/2017 9:00 AM Mali E Laws, PTA [facility]   5/3/2017 9:00 AM Mali E Laws, PTA [facility]   5/5/2017 9:00 AM Mali E Laws, PTA [facility]   5/8/2017 9:00 AM Mali E Laws, PTA [facility]   5/10/2017 8:30 AM Mali E Laws, PTA [facility]   5/12/2017 9:30 AM Faizan Yuan, PT [facility]   5/15/2017 9:30 AM Mali E Laws, PTA [facility]   5/17/2017 9:00 AM Mali E Laws, PTA [facility]   5/19/2017 9:30 AM Mali E Laws, PTA [facility]   5/24/2017 9:15 AM [facility] NURSE VISIT [facility]   5/31/2017 10:00 AM Nimisha Peguero MD [facility]

## 2017-04-24 NOTE — PROGRESS NOTES
Patient: Gallito Valdes                MRN: [id number]       SSN: [id number]  YOB: 1945        AGE: 70 y.o. SEX: female  Body mass index is 39.99 kg/(m^2). PCP: Miroslava Rivas MD  04/24/17      Chief Complaint   Patient presents with    Follow-up     s/p left shoulder arthroscopic repair       HISTORY OF PRESENT ILLNESS: Gallito Valdes is a 70 y.o. female who presents to the office for s/p 5 left shoulder rotator cuff tear. Portal sites look excellent. Review of Systems   Constitutional: Negative. HENT: Negative. Eyes: Negative. Respiratory: Negative. Cardiovascular: Negative. Gastrointestinal: Negative. Genitourinary: Negative. Musculoskeletal: Positive for joint pain (left shoulder). Skin: Negative. Neurological: Negative. Endo/Heme/Allergies: Negative. Psychiatric/Behavioral: Negative. Social History     Social History    Marital status:      Spouse name: N/A    Number of children: N/A    Years of education: N/A     Occupational History    Not on file. Social History Main Topics    Smoking status: Former Smoker     Packs/day: 3.00     Years: 20.00     Types: Cigarettes     Start date: 9/16/1963     Quit date: 1/5/1983    Smokeless tobacco: Never Used    Alcohol use No    Drug use: No    Sexual activity: No     Other Topics Concern    Not on file     Social History Narrative        Past Medical History:   Diagnosis Date    Ast[facility] with COPD ([facility] [address]     Bilateral pulmonary embolism ([facility] [address] 09/2015    Unprovoked. Negative hypercoaguable workup. Completed 6 months of Xarelto.  Cervical spondylosis     Colon adenoma     CTS (carpal tunnel syndrome)     Cystitis cystica 02/2016    Intermittent symptomatic UTIs. Evaluation by Dr. Ceci Benjamin. Negative abdom. CT scan and renal ultrasound. Cystoscopy showing diffuse cystitis cystica.      Family history of colon cancer     GERD (gastroesophageal reflux disease)     Hypertension     Labyrinthitis     Left sided sciatica     Lumbar spondylosis     Microhematuria     Multiple pulmonary nodules determined by computed tomography of lung 03/2016    Bilateral. 3-5 mm in size. Dr. Jomar Blakely.  Osteopenia     Prediabetes     Status post gastric bypass for obesity     Thyroid nodule 11/2015    Thyroid ultrasound with dominant 2.7 cm solid nodule mid-lower left lobe. Dr. Guido Turpin. FNA with benign cytology. Allergies   Allergen Reactions    Macrobid [Nitrofurantoin Monohyd/M-Cryst] Anaphylaxis    Aspirin Unknown (comments)     Cannot take due to gastric bypass.  Lisinopril Cough    Parafon Forte Dsc [Chlorzoxazone] Other (comments)     Excessive drowsiness         Current Outpatient Prescriptions   Medication Sig    hydroCHLOROthiazide (HYDRODIURIL) 25 mg tablet Take 1 Tab by mouth daily.  oxyCODONE-acetaminophen (PERCOCET)  mg per tablet Take 1-2 Tabs by mouth every four (4) hours as needed for Pain. Max Daily Amount: 12 Tabs. Do not take until after surgery    cranberry extract 450 mg tab Take  by mouth.  simvastatin (ZOCOR) 20 mg tablet TAKE ONE TABLET BY MOUTH IN THE EVENING    cyclobenzaprine (FLEXERIL) 10 mg tablet Take 1 Tab by mouth three (3) times daily as needed for Muscle Spasm(s).  traMADol (ULTRAM) 50 mg tablet Take 1 Tab by mouth every six (6) hours as needed for Pain.  ondansetron hcl (ZOFRAN) 4 mg tablet Take 1 Tab by mouth every eight (8) hours as needed.  losartan (COZAAR) 25 mg tablet Take 1 Tab by mouth daily.  zafirlukast (ACCOLATE) 20 mg tablet Take 1 Tab by mouth two (2) times a day.  albuterol (PROVENTIL HFA, VENTOLIN HFA, PROAIR HFA) 90 mcg/actuation inhaler Take 1-2 Puffs by inhalation every four (4) hours as needed for Wheezing.  fluticasone-salmeterol (ADVAIR DISKUS) 250-50 mcg/dose diskus inhaler Take 1 Puff by inhalation every twelve (12) hours.     cephALEXin (KEFLEX) 500 mg capsule Take one capsule by mouth three times daily. START TAKING MEDICATION THREE DAYS PRIOR TO PROCEDURE.  diazepam (VALIUM) 2 mg tablet Take 2 mg by mouth as needed.  inhalational spacing device 1 Each by Does Not Apply route as needed.  cholecalciferol, vitamin d3, (VITAMIN D) 1,000 unit tablet Take 1,000 Units by mouth two (2) times a day.  cyanocobalamin (VITAMIN B-12) 500 mcg tablet Take 500 mcg by mouth daily.  CALCIUM CARBONATE/VITAMIN D3 (OS- + D PO) Take 1 Cap by mouth two (2) times a day.  POLYETHYLENE GLYCOL 3350 (MIRALAX PO) Take  by mouth nightly.  omeprazole (PRILOSEC) 20 mg capsule Take 20 mg by mouth daily. No current facility-administered medications for this visit. Physical Exam  Constitutional: she is oriented to person, place, and time and well-developed, well-nourished, and in no distress. No distress. HENT:   Head: Normocephalic and atraumatic. Right Ear: Hearing normal.   Left Ear: Hearing normal.   Nose: Nose normal.   Eyes: Conjunctivae, EOM and lids are normal. Pupils are equal, round, and reactive to light. Neck: Trachea normal.   Pulmonary/Chest: Effort normal and breath sounds normal. No respiratory distress. Abdominal: Soft. Neurological: she is alert and oriented to person, place, and time. Skin: Skin is warm, dry and intact. she is not diaphoretic. Psychiatric: Affect normal.   Nursing note and vitals reviewed. Ortho Exam   There is no redness or tenderness. RADIOGRAPHS:  No new XR's taken today. IMPRESSION & PLAN:   I personally instructed the pt in gentle ROM of several techniques requiring no stress on the repair site. Pt will be seen and follow up in 2 weeks. Written by [place], as dictated by Dr. Jacque Jara, Dr. Nyla Bustos, confirm that all documentation is accurate.

## 2017-04-26 ENCOUNTER — HOSPITAL ENCOUNTER (OUTPATIENT)
Dept: PHYSICAL THERAPY | Age: 72
Discharge: HOME | End: 2017-04-26
Payer: COMMERCIAL

## 2017-04-26 ENCOUNTER — TELEPHONE (OUTPATIENT)
Dept: INTERNAL MEDICINE CLINIC | Age: 72
End: 2017-04-26

## 2017-04-26 PROCEDURE — 97140 MANUAL THERAPY 1/> REGIONS: CPT

## 2017-04-26 PROCEDURE — 97110 THERAPEUTIC EXERCISES: CPT

## 2017-04-26 NOTE — PROGRESS NOTES
PT DAILY TREATMENT NOTE     Patient Name: Sheng Galeano  Date:2017  : 1945  [x]  Patient  Verified  Payor: BLUE CROSS / Plan: [facility] / Product Type: PPO /    In time:9:48  Out time:10:25  Total Treatment Time (min): 37  Visit #: 4 of 12    Treatment Area: Complete rotator cuff tear or rupture of left shoulder, not specified as traumatic [M75.122]    SUBJECTIVE  Pain Level (0-10 scale): 2  Any medication changes, allergies to medications, adverse drug reactions, diagnosis change, or new procedure performed?: [x] No    [] Yes (see summary sheet for update)  Subjective functional status/changes:   [] No changes reported  Pt reports she went to the doctor yesterday and he stated that everything she was doing was going well. Pt reports that doctor told her to start walking her arm up the wall.      OBJECTIVE        Min Type Additional Details    [] Estim:  []Unatt       []IFC  []Premod                        []Other:  []w/ice   []w/heat  Position:  Location:    [] Estim: []Att    []TENS instruct  []NMES                    []Other:  []w/US   []w/ice   []w/heat  Position:  Location:    []  Traction: [] Cervical       []Lumbar                       [] Prone          []Supine                       []Intermittent   []Continuous Lbs:  [] before manual  [] after manual    []  Ultrasound: []Continuous   [] Pulsed                           []1MHz   []3MHz W/cm2:  Location:    []  Iontophoresis with dexamethasone         Location: [] Take home patch   [] In clinic    []  Ice     []  heat  []  Ice massage  []  Laser   []  Anodyne Position:  Location:    []  Laser with stim  []  Other:  Position:  Location:    []  Vasopneumatic Device Pressure:       [] lo [] med [] hi   Temperature: [] lo [] med [] hi   [] Skin assessment post-treatment:  []intact []redness- no adverse reaction    []redness  adverse reaction:       23 min Therapeutic Exercise:  [x] See flow sheet :   Rationale: increase ROM and increase strength to improve the patients ability to increase tolerance to activities. 14 min Manual Therapy:  Per flow sheet   Rationale: decrease pain, increase ROM, increase tissue extensibility and decrease trigger points to increase ease with ADLs. With   [] TE   [] TA   [] neuro   [] other: Patient Education: [x] Review HEP    [] Progressed/Changed HEP based on:   [] positioning   [] body mechanics   [] transfers   [] heat/ice application    [] other:      Other Objective/Functional Measures: Pt felt relief in her shoulder after manual stretching and exercises. Pain Level (0-10 scale) post treatment:0    ASSESSMENT/Changes in Function: Continue per POC. Patient will continue to benefit from skilled PT services to modify and progress therapeutic interventions, address functional mobility deficits, address ROM deficits, address strength deficits and analyze and address soft tissue restrictions to attain remaining goals. []  See Plan of Care  []  See progress note/recertification  []  See Discharge Summary         Progress towards goals / Updated goals:  Short term goals: to be accomplished in 2 weeks  1) Pt will report (I) and compliance with HEP for home management of symptoms. At eval: Instructed, demonstrated, and performed HEP  Current: Goal met per pt report (4/21/17)  2) Pt will improve L shoulder PROM flex/scap to 90 deg in order to perform ADL's. At eval: L shoulder PROM flex 30 deg  Long term goals: to be accomplished in 4 weeks  1) Pt's FOTO score will improve > or = 59 indicating improvements in function. At eval: FOTO = 22  2) Pt will improve L shoulder PROM flex > or = 140 deg for ease with ADL's. At eval: L shoulder PROM flex 30 deg  3) Pt will improve L shoulder PROM IR > or = 50 deg for ease with ADL's  At eval: NT  4) Pt will demo L shoulder AAROM flex/scap > or = 90 deg for ease with ADL's.   At eval: NT        PLAN  []  Upgrade activities as tolerated     [x] Continue plan of care  []  Update interventions per flow sheet       []  Discharge due to:_  []  Other:_      Hair Sickle, PTA 4/26/2017  10:37 AM    Future Appointments  Date Time Provider Pat Connie   4/28/2017 10:30 AM Mali E Laws, PTA [facility]   5/1/2017 9:00 AM Mali E Laws, PTA [facility]   5/3/2017 9:00 AM Mali E Laws, PTA [facility]   5/5/2017 9:00 AM Mali E Laws, PTA [facility]   5/8/2017 9:00 AM Mali E Laws, PTA [facility]   5/9/2017 1:45 PM TATO Duffy [facility] SCH[facility]   5/10/2017 8:30 AM Mali E Laws, PTA [facility]   5/12/2017 9:30 AM Bernardo Drake, PT [facility]   5/15/2017 9:30 AM Mali E Laws, PTA [facility]   5/17/2017 9:00 AM Mali E Laws, PTA [facility]   5/19/2017 9:30 AM Mali E Laws, PTA [facility]   5/24/2017 9:15 AM IO NURSE VISIT [facility] JIN SCHED   5/31/2017 10:00 AM Vania Hamlin MD [facility]

## 2017-04-26 NOTE — TELEPHONE ENCOUNTER
Patient wants to start seeing Dr. Rollins Covert.  He said that was fine but wanted to make sure you are OK with it first.

## 2017-04-28 ENCOUNTER — HOSPITAL ENCOUNTER (OUTPATIENT)
Dept: PHYSICAL THERAPY | Age: 72
Discharge: HOME | End: 2017-04-28
Payer: COMMERCIAL

## 2017-04-28 PROCEDURE — 97110 THERAPEUTIC EXERCISES: CPT

## 2017-04-28 PROCEDURE — 97140 MANUAL THERAPY 1/> REGIONS: CPT

## 2017-04-28 NOTE — PROGRESS NOTES
PT DAILY TREATMENT NOTE     Patient Name: You Griggs  Date:2017  : 1945  [x]  Patient  Verified  Payor: BLUE CROSS / Plan: "[employer] [facility] / Product Type: PPO /    In time: 10:28  Out time:11:09  Total Treatment Time (min): 41  Visit #: 5 of 12    Treatment Area: Complete rotator cuff tear or rupture of left shoulder, not specified as traumatic [M75.122]    SUBJECTIVE  Pain Level (0-10 scale): 10  Any medication changes, allergies to medications, adverse drug reactions, diagnosis change, or new procedure performed?: [x] No    [] Yes (see summary sheet for update)  Subjective functional status/changes:   [] No changes reported  Pt states her f/u was with Dr. Uriel De La Torre because Ericka Valerio was not there. She states she did not attempt any of the wall slides. OBJECTIVE    Modality rationale: decrease pain to improve the patients ability to perform ADLs.     Min Type Additional Details    [] Estim:  []Unatt       []IFC  []Premod                        []Other:  []w/ice   []w/heat  Position:  Location:    [] Estim: []Att    []TENS instruct  []NMES                    []Other:  []w/US   []w/ice   []w/heat  Position:  Location:    []  Traction: [] Cervical       []Lumbar                       [] Prone          []Supine                       []Intermittent   []Continuous Lbs:  [] before manual  [] after manual    []  Ultrasound: []Continuous   [] Pulsed                           []1MHz   []3MHz W/cm2:  Location:    []  Iontophoresis with dexamethasone         Location: [] Take home patch   [] In clinic   10 [x]  Ice     []  heat  []  Ice massage  []  Laser   []  Anodyne Position: reclined  Location: L shoulder    []  Laser with stim  []  Other:  Position:  Location:    []  Vasopneumatic Device Pressure:       [] lo [] med [] hi   Temperature: [] lo [] med [] hi   [] Skin assessment post-treatment:  []intact []redness- no adverse reaction    []redness  adverse reaction:     19 min Therapeutic Exercise:  [x] See flow sheet :   Rationale: increase ROM to improve the patients ability to perform ADLs. 12 min Manual Therapy:  Per flow sheet   Rationale: decrease pain, increase ROM, increase tissue extensibility and decrease trigger points to increase ease of ADLs. With   [] TE   [] TA   [] neuro   [] other: Patient Education: [x] Review HEP    [] Progressed/Changed HEP based on:   [] positioning   [] body mechanics   [] transfers   [] heat/ice application    [] other:      Other Objective/Functional Measures:  PROM flex 84 degrees. Pain Level (0-10 scale) post treatment: 1/10    ASSESSMENT/Changes in Function: Pt was advised not to do the wall slides that Dr. Melania Hull ordered as she is less than 2 week post-op. Patient will continue to benefit from skilled PT services to modify and progress therapeutic interventions, address functional mobility deficits, address ROM deficits and address strength deficits to attain remaining goals. []  See Plan of Care  []  See progress note/recertification  []  See Discharge Summary         Progress towards goals / Updated goals:  Short term goals: to be accomplished in 2 weeks  1) Pt will report (I) and compliance with HEP for home management of symptoms. At [facility]: Instructed, demonstrated, and performed HEP  Current: Goal met per pt report (4/21/17)  2) Pt will improve L shoulder PROM flex/scap to 90 deg in order to perform ADL's. At eval: L shoulder PROM flex 30 deg  Current: Progressing, flex reaches 84 degrees. 4/28/17  Long term goals: to be accomplished in 4 weeks  1) Pt's FOTO score will improve > or = 59 indicating improvements in function. At eval: FOTO = 22  2) Pt will improve L shoulder PROM flex > or = 140 deg for ease with ADL's. At eval: L shoulder PROM flex 30 deg  3) Pt will improve L shoulder PROM IR > or = 50 deg for ease with ADL's  At eval: NT  4) Pt will demo L shoulder AAROM flex/scap > or = 90 deg for ease with ADL's.   At eval: NT     PLAN  []  Upgrade activities as tolerated     [x]  Continue plan of care  []  Update interventions per flow sheet       []  Discharge due to:_  []  Other:_      Mali E Laws, PTA 4/28/2017  10:59 AM    Future Appointments  Date Time Provider Pat Rosales   5/1/2017 9:00 AM Mali E Laws, PTA [facility]   5/3/2017 9:00 AM Mali E Laws, PTA [facility]   5/5/2017 9:00 AM Mali E Laws, PTA [facility]   5/8/2017 9:00 AM Mali E Laws, PTA [facility]   5/9/2017 1:45 PM TATO Fuentes [facility] JIN SCHED   5/10/2017 8:30 AM Mali E Laws, PTA [facility]   5/12/2017 9:30 AM Sallye Bence, PT [facility]   5/15/2017 9:30 AM Mali E Laws, PTA [facility]   5/17/2017 9:00 AM Mali E Laws, PTA [facility]   5/19/2017 9:30 AM Mali E Laws, PTA [facility]   5/24/2017 9:15 AM IO NURSE VISIT [facility] JIN SCHED   5/31/2017 2:00 PM Raza Aguero MD [facility]

## 2017-05-01 ENCOUNTER — HOSPITAL ENCOUNTER (OUTPATIENT)
Dept: PHYSICAL THERAPY | Age: 72
Discharge: HOME | End: 2017-05-01
Payer: MEDICARE

## 2017-05-01 PROCEDURE — 97110 THERAPEUTIC EXERCISES: CPT

## 2017-05-01 PROCEDURE — 97140 MANUAL THERAPY 1/> REGIONS: CPT

## 2017-05-01 NOTE — PROGRESS NOTES
PT DAILY TREATMENT NOTE     Patient Name: Sheng Galeano  Date:2017  : 1945  [x]  Patient  Verified  Payor: Tim Pittman / Plan: [place] MEDICARE PART A / Product Type: Medicare /    In time: 8:52  Out time: 9:02  Total Treatment Time (min): 40  Visit #: 6 of 12    Treatment Area: Complete rotator cuff tear or rupture of left shoulder, not specified as traumatic [M75.122]    SUBJECTIVE  Pain Level (0-10 scale): 3/10  Any medication changes, allergies to medications, adverse drug reactions, diagnosis change, or new procedure performed?: [x] No    [] Yes (see summary sheet for update)  Subjective functional status/changes:   [] No changes reported  Pt states she had company on Saturday and did not get to her HEP until the evening, she states she got stiff. OBJECTIVE    Modality rationale: decrease pain to improve the patients ability to increase ease of ADLs.     Min Type Additional Details    [] Estim:  []Unatt       []IFC  []Premod                        []Other:  []w/ice   []w/heat  Position:  Location:    [] Estim: []Att    []TENS instruct  []NMES                    []Other:  []w/US   []w/ice   []w/heat  Position:  Location:    []  Traction: [] Cervical       []Lumbar                       [] Prone          []Supine                       []Intermittent   []Continuous Lbs:  [] before manual  [] after manual    []  Ultrasound: []Continuous   [] Pulsed                           []1MHz   []3MHz W/cm2:  Location:    []  Iontophoresis with dexamethasone         Location: [] Take home patch   [] In clinic   10 [x]  Ice     []  heat  []  Ice massage  []  Laser   []  Anodyne Position: reclined  Location: L shoulder    []  Laser with stim  []  Other:  Position:  Location:    []  Vasopneumatic Device Pressure:       [] lo [] med [] hi   Temperature: [] lo [] med [] hi   [] Skin assessment post-treatment:  []intact []redness- no adverse reaction    []redness  adverse reaction:     15 min Therapeutic Exercise:  [x] See flow sheet :   Rationale: increase ROM and increase strength to improve the patients ability to perform ADLs. 15 min Manual Therapy:  Per flow sheet   Rationale: decrease pain, increase ROM and increase tissue extensibility to increase ease of ADLs. With   [] TE   [] TA   [] neuro   [] other: Patient Education: [x] Review HEP    [] Progressed/Changed HEP based on:   [] positioning   [] body mechanics   [] transfers   [] heat/ice application    [] other:      Other Objective/Functional Measures: FOTO: 31, an increase of 9. Pain Level (0-10 scale) post treatment: 1/10    ASSESSMENT/Changes in Function: Cont per POC. Patient will continue to benefit from skilled PT services to modify and progress therapeutic interventions, address functional mobility deficits and address ROM deficits to attain remaining goals. []  See Plan of Care  []  See progress note/recertification  []  See Discharge Summary         Progress towards goals / Updated goals:  Short term goals: to be accomplished in 2 weeks  1) Pt will report (I) and compliance with HEP for home management of symptoms. At [facility]: Instructed, demonstrated, and performed HEP  Current: Goal met per pt report (4/21/17)  2) Pt will improve L shoulder PROM flex/scap to 90 deg in order to perform ADL's. At eval: L shoulder PROM flex 30 deg  Current: Progressing, flex reaches 84 degrees. 4/28/17  Long term goals: to be accomplished in 4 weeks  1) Pt's FOTO score will improve > or = 59 indicating improvements in function. At [facility]: FOTO = 22  Current:Progressing, 31, an increase of 9.  5/1/17  2) Pt will improve L shoulder PROM flex > or = 140 deg for ease with ADL's. At eval: L shoulder PROM flex 30 deg  3) Pt will improve L shoulder PROM IR > or = 50 deg for ease with ADL's  At eval: NT  4) Pt will demo L shoulder AAROM flex/scap > or = 90 deg for ease with ADL's.   At eval: NT     PLAN  []  Upgrade activities as tolerated     [x] Continue plan of care  []  Update interventions per flow sheet       []  Discharge due to:_  []  Other:_      Mali E Laws, PTA 5/1/2017  9:02 AM    Future Appointments  Date Time Provider Pat Rosales   5/3/2017 9:00 AM Mali E Laws, PTA [facility]   5/5/2017 9:00 AM Mali E Laws, PTA [facility]   5/8/2017 9:00 AM Mali E Laws, PTA [facility]   5/9/2017 1:45 PM TATO Amos [facility] JIN SCHED   5/10/2017 8:30 AM Mali E Laws, PTA [facility]   5/12/2017 9:30 AM Neville Dobson, PT [facility]   5/15/2017 9:30 AM Mali E Laws, PTA [facility]   5/17/2017 9:00 AM Mali E Laws, PTA [facility]   5/19/2017 9:30 AM Mali E Laws, PTA [facility]   5/24/2017 9:15 AM IOC NURSE VISIT [facility] JIN SCHED   5/31/2017 2:00 PM Jluis Stewart MD [facility]

## 2017-05-03 ENCOUNTER — HOSPITAL ENCOUNTER (OUTPATIENT)
Dept: PHYSICAL THERAPY | Age: 72
Discharge: HOME | End: 2017-05-03
Payer: MEDICARE

## 2017-05-03 PROCEDURE — 97140 MANUAL THERAPY 1/> REGIONS: CPT

## 2017-05-03 PROCEDURE — 97110 THERAPEUTIC EXERCISES: CPT

## 2017-05-03 NOTE — PROGRESS NOTES
PT DAILY TREATMENT NOTE     Patient Name: Myranda Mayers  Date:5/3/2017  : 1945  [x]  Patient  Verified  Payor: [employer]  / Plan: [place] MEDICARE PART A / Product Type: Medicare /    In time: 8:56  Out time:9:37  Total Treatment Time (min): 41  Visit #: 7 of 12    Treatment Area: Complete rotator cuff tear or rupture of left shoulder, not specified as traumatic [M75.122]    SUBJECTIVE  Pain Level (0-10 scale): 1/10  Any medication changes, allergies to medications, adverse drug reactions, diagnosis change, or new procedure performed?: [x] No    [] Yes (see summary sheet for update)  Subjective functional status/changes:   [] No changes reported  Pt states she is feeling okay. OBJECTIVE    Modality rationale: decrease pain to improve the patients ability to perform ADLs.     Min Type Additional Details    [] Estim:  []Unatt       []IFC  []Premod                        []Other:  []w/ice   []w/heat  Position:  Location:    [] Estim: []Att    []TENS instruct  []NMES                    []Other:  []w/US   []w/ice   []w/heat  Position:  Location:    []  Traction: [] Cervical       []Lumbar                       [] Prone          []Supine                       []Intermittent   []Continuous Lbs:  [] before manual  [] after manual    []  Ultrasound: []Continuous   [] Pulsed                           []1MHz   []3MHz W/cm2:  Location:    []  Iontophoresis with dexamethasone         Location: [] Take home patch   [] In clinic   10 [x]  Ice     []  heat  []  Ice massage  []  Laser   []  Anodyne Position: reclined  Location: L shoulder    []  Laser with stim  []  Other:  Position:  Location:    []  Vasopneumatic Device Pressure:       [] lo [] med [] hi   Temperature: [] lo [] med [] hi   [] Skin assessment post-treatment:  []intact []redness- no adverse reaction    []redness  adverse reaction:     21 min Therapeutic Exercise:  [x] See flow sheet :   Rationale: increase ROM and increase strength to improve the patients ability to increase easeof ADLs. 10 min Manual Therapy:  Per flow sheet   Rationale: decrease pain, increase ROM and increase tissue extensibility to increase ease of ADLs. With   [] TE   [] TA   [] neuro   [] other: Patient Education: [x] Review HEP    [] Progressed/Changed HEP based on:   [] positioning   [] body mechanics   [] transfers   [] heat/ice application    [] other:      Other Objective/Functional Measures: VCs throughout manual to prevent muscle guarding. Pain Level (0-10 scale) post treatment: 0/10    ASSESSMENT/Changes in Function: Cont per POC. Patient will continue to benefit from skilled PT services to modify and progress therapeutic interventions, address functional mobility deficits, address ROM deficits and address strength deficits to attain remaining goals. []  See Plan of Care  []  See progress note/recertification  []  See Discharge Summary         Progress towards goals / Updated goals:  Short term goals: to be accomplished in 2 weeks  1) Pt will report (I) and compliance with HEP for home management of symptoms. At eval: Instructed, demonstrated, and performed HEP  Current: Goal met per pt report (4/21/17)  2) Pt will improve L shoulder PROM flex/scap to 90 deg in order to perform ADL's. At eval: L shoulder PROM flex 30 deg  Current: Progressing, flex reaches 84 degrees. 4/28/17  Long term goals: to be accomplished in 4 weeks  1) Pt's FOTO score will improve > or = 59 indicating improvements in function. At eval: FOTO = 22  Current:Progressing, 31, an increase of 9. 5/1/17  2) Pt will improve L shoulder PROM flex > or = 140 deg for ease with ADL's. At eval: L shoulder PROM flex 30 deg  3) Pt will improve L shoulder PROM IR > or = 50 deg for ease with ADL's  At eval: NT  4) Pt will demo L shoulder AAROM flex/scap > or = 90 deg for ease with ADL's.   At eval: NT     PLAN  []  Upgrade activities as tolerated     [x]  Continue plan of care  []  Update interventions per flow sheet       []  Discharge due to:_  []  Other:_      Mali E Laws, PTA 5/3/2017  9:09 AM    Future Appointments  Date Time Provider Pat Rosales   5/5/2017 9:00 AM Mali E Laws, PTA [facility]   5/8/2017 9:00 AM Mali E Laws, PTA [facility]   5/9/2017 1:45 PM TATO Fontenot [facility] JIN SCHED   5/10/2017 8:30 AM Mali E Laws, PTA [facility]   5/12/2017 9:30 AM Rivas Robles, PT [facility]   5/15/2017 9:30 AM Mali E Laws, PTA [facility]   5/17/2017 9:00 AM Mali E Laws, PTA [facility]   5/19/2017 9:30 AM Mali E Laws, PTA [facility]   5/24/2017 9:15 AM IOC NURSE VISIT [facility] JIN SCHED   5/31/2017 2:00 PM Greyson Whyte MD [facility]

## 2017-05-05 ENCOUNTER — APPOINTMENT (OUTPATIENT)
Dept: PHYSICAL THERAPY | Age: 72
End: 2017-05-05
Payer: MEDICARE

## 2017-05-08 ENCOUNTER — HOSPITAL ENCOUNTER (OUTPATIENT)
Dept: PHYSICAL THERAPY | Age: 72
Discharge: HOME | End: 2017-05-08
Payer: MEDICARE

## 2017-05-08 PROCEDURE — 97110 THERAPEUTIC EXERCISES: CPT

## 2017-05-08 PROCEDURE — 97140 MANUAL THERAPY 1/> REGIONS: CPT

## 2017-05-08 NOTE — PROGRESS NOTES
PT DAILY TREATMENT NOTE     Patient Name: Dorothy Moreno  Date:2017  : 1945  [x]  Patient  Verified  Payor: Anju Philip / Plan: [place] MED[place] / Product Type: Medicare /    In time: 8:52  Out time:9:32  Total Treatment Time (min): 40  Visit #: 8 of 12    Treatment Area: Complete rotator cuff tear or rupture of left shoulder, not specified as traumatic [M75.122]    SUBJECTIVE  Pain Level (0-10 scale): 1/10  Any medication changes, allergies to medications, adverse drug reactions, diagnosis change, or new procedure performed?: [x] No    [] Yes (see summary sheet for update)  Subjective functional status/changes:   [] No changes reported  Pt states she is feeling good. OBJECTIVE    Modality rationale: decrease pain to improve the patients ability to perform ADLs.     Min Type Additional Details    [] Estim:  []Unatt       []IFC  []Premod                        []Other:  []w/ice   []w/heat  Position:  Location:    [] Estim: []Att    []TENS instruct  []NMES                    []Other:  []w/US   []w/ice   []w/heat  Position:  Location:    []  Traction: [] Cervical       []Lumbar                       [] Prone          []Supine                       []Intermittent   []Continuous Lbs:  [] before manual  [] after manual    []  Ultrasound: []Continuous   [] Pulsed                           []1MHz   []3MHz W/cm2:  Location:    []  Iontophoresis with dexamethasone         Location: [] Take home patch   [] In clinic   10 [x]  Ice     []  heat  []  Ice massage  []  Laser   []  Anodyne Position:   Location:    []  Laser with stim  []  Other:  Position:  Location:    []  Vasopneumatic Device Pressure:       [] lo [] med [] hi   Temperature: [] lo [] med [] hi   [] Skin assessment post-treatment:  []intact []redness- no adverse reaction    []redness  adverse reaction:     20 min Therapeutic Exercise:  [x] See flow sheet :   Rationale: increase ROM and increase strength to improve the patients ability to perform ADLs. 10 min Manual Therapy:  Per flow sheet   Rationale: decrease pain, increase ROM and increase tissue extensibility to increase ease of ADLs. With   [] TE   [] TA   [] neuro   [] other: Patient Education: [x] Review HEP    [] Progressed/Changed HEP based on:   [] positioning   [] body mechanics   [] transfers   [] heat/ice application    [] other:      Other Objective/Functional Measures: PROM flex 108 degrees. Pain Level (0-10 scale) post treatment: 0/10    ASSESSMENT/Changes in Function: Cont per POC. Patient will continue to benefit from skilled PT services to modify and progress therapeutic interventions, address functional mobility deficits, address ROM deficits and address strength deficits to attain remaining goals. []  See Plan of Care  []  See progress note/recertification  []  See Discharge Summary         Progress towards goals / Updated goals:  Short term goals: to be accomplished in 2 weeks  1) Pt will report (I) and compliance with HEP for home management of symptoms. At [facility]: Instructed, demonstrated, and performed HEP  Current: Goal met per pt report (4/21/17)  2) Pt will improve L shoulder PROM flex/scap to 90 deg in order to perform ADL's. At al: L shoulder PROM flex 30 deg  Current: Met, flex reaches 108 degrees. 5/8/17  Long term goals: to be accomplished in 4 weeks  1) Pt's FOTO score will improve > or = 59 indicating improvements in function. At [facility]: FOTO = 22  Current:Progressing, 31, an increase of 9. 5/1/17  2) Pt will improve L shoulder PROM flex > or = 140 deg for ease with ADL's. At al: L shoulder PROM flex 30 deg  Current: Progressing, flex 108 degrees. 5/8/17  3) Pt will improve L shoulder PROM IR > or = 50 deg for ease with ADL's  At eval: NT  4) Pt will demo L shoulder AAROM flex/scap > or = 90 deg for ease with ADL's.   At eval: NT     PLAN  []  Upgrade activities as tolerated     [x]  Continue plan of care  []  Update interventions per flow sheet       []  Discharge due to:_  []  Other:_      Apolinar Hooker, PTA 5/8/2017  9:06 AM    Future Appointments  Date Time Provider Pat Rosales   5/9/2017 1:45 PM TATO CoffeyMD [facility] SCHED   5/10/2017 8:30 AM Mali E Laws, PTA [facility]   5/12/2017 9:30 AM Jesika Jordan, PT [facility]   5/15/2017 9:30 AM Mali E Laws, PTA [facility]   5/17/2017 9:00 AM Mali E Laws, PTA [facility]   5/19/2017 9:30 AM Mali E Laws, PTA [facility]   5/24/2017 9:15 AM [facility] NURSE VISIT [facility] SCHED   5/31/2017 2:00 PM Thomas Padilla MD [facility]

## 2017-05-09 ENCOUNTER — OFFICE VISIT (OUTPATIENT)
Dept: ORTHOPEDIC SURGERY | Age: 72
End: 2017-05-09

## 2017-05-09 VITALS
HEIGHT: 64 IN | TEMPERATURE: 96.7 F | WEIGHT: 225 LBS | HEART RATE: 75 BPM | BODY MASS INDEX: 38.41 KG/M2 | DIASTOLIC BLOOD PRESSURE: 69 MMHG | SYSTOLIC BLOOD PRESSURE: 134 MMHG

## 2017-05-09 DIAGNOSIS — M75.122 COMPLETE TEAR OF LEFT ROTATOR CUFF: Primary | ICD-10-CM

## 2017-05-09 NOTE — PROGRESS NOTES
Ruthy Sims  1945     HISTORY OF PRESENT ILLNESS  Ruthy Sims is a 70 y.o. female who presents today for evaluation s/p Left shoulder arthroscopic rotator cuff repair and subscapularis repair on 4/19/17. Patient has been going to PT. Describes pain as a 3/10. Has been taking percocet rarely for pain. Still has night pain. Patient denies any fever, chills, chest pain, shortness of breath or calf pain. There are no new illness or injuries to report since last seen in the office. PHYSICAL EXAM:   Visit Vitals    /69    Pulse 75    Temp 96.7 °F (35.9 °C) (Oral)    Ht 5' 4\" (1.626 m)    Wt 225 lb (102.1 kg)    BMI 38.62 kg/m2      The patient is a well-developed, well-nourished female in no acute distress. The patient is alert and oriented times three. The patient appears to be well groomed. Mood and affect are normal.  ORTHOPEDIC EXAM of left shoulder:  Inspection: swelling not present,  Bruising not present  Incisions well healed  Passive glenohumeral abduction 0-85 degrees  Stability: Stable  Strength: n/a  2+ distal pulses    IMPRESSION:  S/P Left shoulder arthroscopic rotator cuff repair and subscapularis repair    PLAN:   1. Patient doing well  2. Continue with PT. Start active assist in 2 weeks.  Will d/c sling and start active phase when I see her back in 3 weeks  RTC 3 weeks    MAKENNA Arthur Client and Spine Specialist

## 2017-05-10 ENCOUNTER — HOSPITAL ENCOUNTER (OUTPATIENT)
Dept: PHYSICAL THERAPY | Age: 72
Discharge: HOME | End: 2017-05-10
Payer: MEDICARE

## 2017-05-10 PROCEDURE — 97140 MANUAL THERAPY 1/> REGIONS: CPT

## 2017-05-10 PROCEDURE — 97110 THERAPEUTIC EXERCISES: CPT

## 2017-05-10 NOTE — PROGRESS NOTES
PT DAILY TREATMENT NOTE     Patient Name: Ayesha Simeon  Date:5/10/2017  : 1945  [x]  Patient  Verified  Payor: [place] MEDICARE / Plan: [place] MEDICARE PART A / Product Type: Medicare /    In time: 8:24 Out time: 9:04  Total Treatment Time (min): 40  Visit #: 9 of 12    Treatment Area: Complete rotator cuff tear or rupture of left shoulder, not specified as traumatic [M75.122]    SUBJECTIVE  Pain Level (0-10 scale): 1/10  Any medication changes, allergies to medications, adverse drug reactions, diagnosis change, or new procedure performed?: [x] No    [] Yes (see summary sheet for update)  Subjective functional status/changes:   [] No changes reported  Pt states she went to the MD yesterday and Sydnee Zapata stated everything was looking good. OBJECTIVE    Modality rationale: decrease pain to improve the patients ability to perform ADLs.     Min Type Additional Details    [] Estim:  []Unatt       []IFC  []Premod                        []Other:  []w/ice   []w/heat  Position:  Location:    [] Estim: []Att    []TENS instruct  []NMES                    []Other:  []w/US   []w/ice   []w/heat  Position:  Location:    []  Traction: [] Cervical       []Lumbar                       [] Prone          []Supine                       []Intermittent   []Continuous Lbs:  [] before manual  [] after manual    []  Ultrasound: []Continuous   [] Pulsed                           []1MHz   []3MHz W/cm2:  Location:    []  Iontophoresis with dexamethasone         Location: [] Take home patch   [] In clinic   10 [x]  Ice     []  heat  []  Ice massage  []  Laser   []  Anodyne Position: reclined  Location: L shoulder.     []  Laser with stim  []  Other:  Position:  Location:    []  Vasopneumatic Device Pressure:       [] lo [] med [] hi   Temperature: [] lo [] med [] hi   [] Skin assessment post-treatment:  []intact []redness- no adverse reaction    []redness  adverse reaction:     20 min Therapeutic Exercise:  [x] See flow sheet : Rationale: increase ROM and increase strength to improve the patients ability to perform ADLs. 10 min Manual Therapy:  Per flow sheet   Rationale: decrease pain, increase ROM and increase tissue extensibility to increase ease of ADLs. With   [] TE   [] TA   [] neuro   [] other: Patient Education: [x] Review HEP    [] Progressed/Changed HEP based on:   [] positioning   [] body mechanics   [] transfers   [] heat/ice application    [] other:      Other Objective/Functional Measures: Pt reaches 90 degrees of scaption without increased pain. Pain Level (0-10 scale) post treatment: 0/10    ASSESSMENT/Changes in Function: Cont per POC. Patient will continue to benefit from skilled PT services to modify and progress therapeutic interventions, address functional mobility deficits and address ROM deficits to attain remaining goals. []  See Plan of Care  []  See progress note/recertification  []  See Discharge Summary         Progress towards goals / Updated goals:  Short term goals: to be accomplished in 2 weeks  1) Pt will report (I) and compliance with HEP for home management of symptoms. At [facility]: Instructed, demonstrated, and performed HEP  Current: Goal met per pt report (4/21/17)  2) Pt will improve L shoulder PROM flex/scap to 90 deg in order to perform ADL's. At eval: L shoulder PROM flex 30 deg  Current: Met, flex reaches 108 degrees. 5/8/17  Long term goals: to be accomplished in 4 weeks  1) Pt's FOTO score will improve > or = 59 indicating improvements in function. At [facility]: FOTO = 22  Current:Progressing, 31, an increase of 9. 5/1/17  2) Pt will improve L shoulder PROM flex > or = 140 deg for ease with ADL's. At eval: L shoulder PROM flex 30 deg  Current: Progressing, flex 108 degrees. 5/8/17  3) Pt will improve L shoulder PROM IR > or = 50 deg for ease with ADL's  At eval: NT  4) Pt will demo L shoulder AAROM flex/scap > or = 90 deg for ease with ADL's.   At eval: NT     PLAN  [] Upgrade activities as tolerated     [x]  Continue plan of care  []  Update interventions per flow sheet       []  Discharge due to:_  []  Other:_      Mali Head, PTA 5/10/2017  8:31 AM    Future Appointments  Date Time Provider Pat Rosales   5/12/2017 9:30 AM Magnolia Hernandes, PT [facility]   5/15/2017 9:30 AM Mali Head, PTA [facility]   5/17/2017 9:00 AM Mali Head, PTA [facility]   5/19/2017 9:30 AM Mali Head, PTA [facility]   5/24/2017 9:15 AM [facility] NURSE VISIT [facility] SCHED   5/30/2017 1:10 PM Marietta Rueda MD [facility] J[facility] SCHED   5/31/2017 2:00 PM Herlinda Acevedo MD [facility]

## 2017-05-12 ENCOUNTER — HOSPITAL ENCOUNTER (OUTPATIENT)
Dept: PHYSICAL THERAPY | Age: 72
Discharge: HOME | End: 2017-05-12
Payer: MEDICARE

## 2017-05-12 PROCEDURE — G8984 CARRY CURRENT STATUS: HCPCS

## 2017-05-12 PROCEDURE — 97110 THERAPEUTIC EXERCISES: CPT

## 2017-05-12 PROCEDURE — G8985 CARRY GOAL STATUS: HCPCS

## 2017-05-12 PROCEDURE — 97140 MANUAL THERAPY 1/> REGIONS: CPT

## 2017-05-12 NOTE — PROGRESS NOTES
PT DAILY TREATMENT NOTE     Patient Name: Antonella Connors  Date:2017  : 1945  [x]  Patient  Verified  Payor: [place] MEDICARE / Plan: [place] MEDICARE PART A / Product Type: Medicare /    In time: 9:25  Out time:10:07  Total Treatment Time (min): 42  Visit #: 10 of 12    Treatment Area: Complete rotator cuff tear or rupture of left shoulder, not specified as traumatic [M75.122]    SUBJECTIVE  Pain Level (0-10 scale): 0  Any medication changes, allergies to medications, adverse drug reactions, diagnosis change, or new procedure performed?: [x] No    [] Yes (see summary sheet for update)  Subjective functional status/changes:   [] No changes reported  Pt reports no current pain today.     OBJECTIVE    Modality rationale: decrease inflammation and decrease pain to improve the patients ability to perform ADL's   Min Type Additional Details    [] Estim:  []Unatt       []IFC  []Premod                        []Other:  []w/ice   []w/heat  Position:  Location:    [] Estim: []Att    []TENS instruct  []NMES                    []Other:  []w/US   []w/ice   []w/heat  Position:  Location:    []  Traction: [] Cervical       []Lumbar                       [] Prone          []Supine                       []Intermittent   []Continuous Lbs:  [] before manual  [] after manual    []  Ultrasound: []Continuous   [] Pulsed                           []1MHz   []3MHz W/cm2:  Location:    []  Iontophoresis with dexamethasone         Location: [] Take home patch   [] In clinic   10 [x]  Ice     []  heat  []  Ice massage  []  Laser   []  Anodyne Position: long sit  Location: L shoulder    []  Laser with stim  []  Other:  Position:  Location:    []  Vasopneumatic Device Pressure:       [] lo [] med [] hi   Temperature: [] lo [] med [] hi   [] Skin assessment post-treatment:  []intact []redness- no adverse reaction    []redness  adverse reaction:      min []Eval                  []Re-Eval       20 min Therapeutic Exercise:  [x] See flow sheet :   Rationale: increase ROM, increase strength and improve coordination to improve the patients ability to decrease pain and perform ADL's     min Therapeutic Activity:  []  See flow sheet :   Rationale:   to improve the patients ability to       min Neuromuscular Re-education:  []  See flow sheet :   Rationale:   to improve the patients ability to     12 min Manual Therapy:  Per flow sheet   Rationale: decrease pain, increase ROM, increase tissue extensibility and decrease trigger points to increase ease with ADL's     min Gait Training:  ___ feet with ___ device on level surfaces with ___ level of assist   Rationale: With   [] TE   [] TA   [] neuro   [] other: Patient Education: [x] Review HEP    [] Progressed/Changed HEP based on:   [] positioning   [] body mechanics   [] transfers   [] heat/ice application    [] other:      Other Objective/Functional Measures: see goals below     Pain Level (0-10 scale) post treatment: 0    ASSESSMENT/Changes in Function: cont per POC    Patient will continue to benefit from skilled PT services to modify and progress therapeutic interventions, address functional mobility deficits, address ROM deficits, address strength deficits, analyze and address soft tissue restrictions, analyze and cue movement patterns, assess and modify postural abnormalities and instruct in home and community integration to attain remaining goals. []  See Plan of Care  []  See progress note/recertification  []  See Discharge Summary         Progress towards goals / Updated goals:  Short term goals: to be accomplished in 2 weeks  1) Pt will report (I) and compliance with HEP for home management of symptoms. At eval: Instructed, demonstrated, and performed HEP  Current: Goal met per pt report (4/21/17)  2) Pt will improve L shoulder PROM flex/scap to 90 deg in order to perform ADL's. At eval: L shoulder PROM flex 30 deg  Current: Met, flex reaches 115 degrees, scap 100 deg. (5/12/17)  Long term goals: to be accomplished in 4 weeks  1) Pt's FOTO score will improve > or = 59 indicating improvements in function. At eval: FOTO = 22  Current:Progressing, 31, an increase of 9. 5/1/17  2) Pt will improve L shoulder PROM flex > or = 140 deg for ease with ADL's. At eval: L shoulder PROM flex 30 deg  Current: Progressing, flex 115 degrees. (5/12/17)  3) Pt will improve L shoulder PROM IR > or = 50 deg for ease with ADL's  At eval: NT  Current: Goal met- L shoulder PROM IR 60 deg (5/12/17)  4) Pt will demo L shoulder AAROM flex/scap > or = 90 deg for ease with ADL's.   At eval: NT  Current: Per script from MD on 5/9/17, begin AAROM on 5/23/17. (5/12/17)     PLAN  [x]  Upgrade activities as tolerated     [x]  Continue plan of care  []  Update interventions per flow sheet       []  Discharge due to:_  []  Other:_      Lonia Coma, PT 5/12/2017  9:19 AM    Future Appointments  Date Time Provider Pat Rosales   5/12/2017 9:30 AM Lonia Coma, PT [facility] [address]   5/15/2017 9:30 AM Malijob Head, PTA [facility] [address]   5/17/2017 9:00 AM Malijob Head, PTA [facility] [address]   5/19/2017 9:30 AM Malijob Head, PTA [facility] [address]   5/24/2017 9:15 AM [facility] NURSE VISIT [facility] JIN SCHED   5/30/2017 1:10 PM Raisa Davidson MD [facility] JIN SCHED   5/31/2017 2:00 PM Jason Lynn MD [facility]

## 2017-05-12 NOTE — PROGRESS NOTES
[facility]              [address], [address]   [phone] fax    Progress Note  Patient name: Bobby Turpni Start of Care: 2017   Referral source: Shannan Monahan : 1945   Medical/Treatment Diagnosis: Complete rotator cuff tear or rupture of left shoulder, not specified as traumatic [M75.122] Onset Date:DOS: 2017     Prior Hospitalization: see medical history Provider#: [id number]   Medications: Verified on Patient Summary List    Comorbidities: Arthritis, Asthma, Back pain, BMI over 30, HTN  Prior Level of Function: Pt is retired but (I) with all ADL's and house chores. Visits from Start of Care: 10    Missed Visits: 1    Key Functional Changes:   Progress towards goals / Updated goals:  Short term goals: to be accomplished in 2 weeks  1) Pt will report (I) and compliance with HEP for home management of symptoms. At eval: Instructed, demonstrated, and performed HEP  Current: Goal met per pt report   2) Pt will improve L shoulder PROM flex/scap to 90 deg in order to perform ADL's. At eval: L shoulder PROM flex 30 deg  Current: Met, flex reaches 115 degrees, scap 100 deg. Long term goals: to be accomplished in 4 weeks  1) Pt's FOTO score will improve > or = 59 indicating improvements in function. At eval: FOTO = 22  Current:Progressing, 31, an increase of 9.   2) Pt will improve L shoulder PROM flex > or = 140 deg for ease with ADL's. At eval: L shoulder PROM flex 30 deg  Current: Progressing, flex 115 degrees. 3) Pt will improve L shoulder PROM IR > or = 50 deg for ease with ADL's  At eval: NT  Current: Goal met- L shoulder PROM IR 60 deg   4) Pt will demo L shoulder AAROM flex/scap > or = 90 deg for ease with ADL's. At eval: NT  Current: Per script from MD on 17, begin AAROM on 17. Pt has demonstrated steady progress per post op protocol.  Pt has demonstrated improvements in L shoulder PROM, pain control, and overall function. Patient will continue to benefit from skilled PT services to modify and progress therapeutic interventions, address functional mobility deficits, address ROM deficits, address strength deficits, analyze and address soft tissue restrictions, analyze and cue movement patterns, assess and modify postural abnormalities and instruct in home and community integration to attain remaining goals. Updated Goals: to be achieved in 4 weeks:   Continue with above unmet goals    G-Codes (GP)  Carry   Current CL= 60-79%   Goal CK= 40-59%    ASSESSMENT/RECOMMENDATIONS:  [x]Continue therapy per initial plan/protocol at a frequency of  3 x per week for 4 weeks  []Continue therapy with the following recommended changes:_____________________      _____________________________________________________________________  []Discontinue therapy progressing towards or have reached established goals  []Discontinue therapy due to lack of appreciable progress towards goals  []Discontinue therapy due to lack of attendance or compliance  []Await Physician's recommendations/decisions regarding therapy  []Other:________________________________________________________________    Thank you for this referral.    Sravani Bobby, PT 5/12/2017 9:23 AM  NOTE TO PHYSICIAN:  Karena [address]ellon [address]   [facility]: [phone]  If you are unable to process this request in 24 hours please contact our office: [phone]    []  I have read the above report and request that my patient continue as recommended. []  I have read the above report and request that my patient continue therapy with the following changes/special instructions:________________________________________  []I have read the above report and request that my patient be discharged from therapy.     Physicians signature: ________________________________Date: _____Time:_____

## 2017-05-15 ENCOUNTER — HOSPITAL ENCOUNTER (OUTPATIENT)
Dept: PHYSICAL THERAPY | Age: 72
Discharge: HOME | End: 2017-05-15
Payer: MEDICARE

## 2017-05-15 DIAGNOSIS — R05.3 PERSISTENT COUGH: ICD-10-CM

## 2017-05-15 PROCEDURE — 97140 MANUAL THERAPY 1/> REGIONS: CPT

## 2017-05-15 PROCEDURE — 97110 THERAPEUTIC EXERCISES: CPT

## 2017-05-15 RX ORDER — LOSARTAN POTASSIUM 25 MG/1
25 TABLET ORAL DAILY
Qty: 90 TAB | Refills: 3 | Status: SHIPPED | OUTPATIENT
Start: 2017-05-15 | End: 2018-05-10 | Stop reason: SDUPTHER

## 2017-05-15 NOTE — PROGRESS NOTES
PT DAILY TREATMENT NOTE     Patient Name: Sheng Galeano  Date:5/15/2017  : 1945  [x]  Patient  Verified  Payor: Tim Pittman / Plan: [place] MEDICARE PART A / Product Type: Medicare /    In time: 9:21  Out time: 10:05  Total Treatment Time (min): 44  Visit #: 11 of 12    Treatment Area: Complete rotator cuff tear or rupture of left shoulder, not specified as traumatic [M75.122]    SUBJECTIVE  Pain Level (0-10 scale): 4/10  Any medication changes, allergies to medications, adverse drug reactions, diagnosis change, or new procedure performed?: [x] No    [] Yes (see summary sheet for update)  Subjective functional status/changes:   [] No changes reported  Pt reports she has been sore. OBJECTIVE    Modality rationale: decrease pain to improve the patients ability to perform ADLs.     Min Type Additional Details    [] Estim:  []Unatt       []IFC  []Premod                        []Other:  []w/ice   []w/heat  Position:  Location:    [] Estim: []Att    []TENS instruct  []NMES                    []Other:  []w/US   []w/ice   []w/heat  Position:  Location:    []  Traction: [] Cervical       []Lumbar                       [] Prone          []Supine                       []Intermittent   []Continuous Lbs:  [] before manual  [] after manual    []  Ultrasound: []Continuous   [] Pulsed                           []1MHz   []3MHz W/cm2:  Location:    []  Iontophoresis with dexamethasone         Location: [] Take home patch   [] In clinic   10 [x]  Ice     []  heat  []  Ice massage  []  Laser   []  Anodyne Position: reclined  Location: L shoulder    []  Laser with stim  []  Other:  Position:  Location:    []  Vasopneumatic Device Pressure:       [] lo [] med [] hi   Temperature: [] lo [] med [] hi   [] Skin assessment post-treatment:  []intact []redness- no adverse reaction    []redness  adverse reaction:     24 min Therapeutic Exercise:  [x] See flow sheet :   Rationale: increase ROM and increase strength to improve the patients ability to perform ADLs. 10 min Manual Therapy:  Per flow sheet   Rationale: decrease pain, increase ROM and increase tissue extensibility to increase ease of ADLs. With   [] TE   [] TA   [] neuro   [] other: Patient Education: [x] Review HEP    [] Progressed/Changed HEP based on:   [] positioning   [] body mechanics   [] transfers   [] heat/ice application    [] other:      Other Objective/Functional Measures: PROM: flex 118 degrees. Pain Level (0-10 scale) post treatment: 1/10    ASSESSMENT/Changes in Function: Cont per POC. Patient will continue to benefit from skilled PT services to modify and progress therapeutic interventions, address functional mobility deficits, address ROM deficits and address strength deficits to attain remaining goals. []  See Plan of Care  []  See progress note/recertification  []  See Discharge Summary         Progress towards goals / Updated goals:  Long term goals: to be accomplished in 4 weeks  1) Pt's FOTO score will improve > or = 59 indicating improvements in function. At PN: Progressing, 31, an increase of 9. 5/1/17  2) Pt will improve L shoulder PROM flex > or = 140 deg for ease with ADL's. At PN: Progressing, flex 115 degrees. (5/12/17)  Current: Progressing: flex 118 degrees. 5/15/17  4) Pt will demo L shoulder AAROM flex/scap > or = 90 deg for ease with ADL's.   At PN: Per script from MD on 5/9/17, begin AAROM on 5/23/17. (5/12/17)     PLAN  []  Upgrade activities as tolerated     [x]  Continue plan of care  []  Update interventions per flow sheet       []  Discharge due to:_  []  Other:_      Mali Head PTA 5/15/2017  10:35 AM    Future Appointments  Date Time Provider Pat Rosales   5/17/2017 9:00 AM Mali Head PTA [facility]   5/19/2017 9:30 AM Mali Head PTA [facility]   5/22/2017 9:30 AM [facility] PT [place] [facility]   5/24/2017 9:15 AM IO NURSE VISIT IO [facility]   5/25/2017 9:00 AM Rad Camara, PTA [facility]   5/26/2017 9:00 AM Mali E Laws, PTA [facility]   5/30/2017 8:30 AM Mali E Laws, PTA [facility]   5/30/2017 1:10 PM Abel Hawley MD [facility]   5/31/2017 9:30 AM [facility] PT [place] 1 [facility]   5/31/2017 2:00 PM Adrianna Love MD [facility] [address]   6/2/2017 9:00 AM Erika Rodriguez [facility]   6/5/2017 10:30 AM Mali E Laws, PTA [facility]   6/7/2017 9:30 AM Mali E Laws, PTA [facility]   6/9/2017 9:30 AM Mali E Laws, PTA [facility]

## 2017-05-15 NOTE — TELEPHONE ENCOUNTER
Call in to [facility]

## 2017-05-17 ENCOUNTER — HOSPITAL ENCOUNTER (OUTPATIENT)
Dept: PHYSICAL THERAPY | Age: 72
Discharge: HOME | End: 2017-05-17
Payer: MEDICARE

## 2017-05-17 PROCEDURE — 97140 MANUAL THERAPY 1/> REGIONS: CPT

## 2017-05-17 PROCEDURE — 97110 THERAPEUTIC EXERCISES: CPT

## 2017-05-17 NOTE — PROGRESS NOTES
PT DAILY TREATMENT NOTE     Patient Name: Jon Flynn  Date:2017  : 1945  [x]  Patient  Verified  Payor: Brennan Acevedo / Plan: [place] MEDICARE PART A / Product Type: Medicare /    In time: 9:00  Out time:9:40  Total Treatment Time (min): 40  Visit #: 4 of 12    Treatment Area: Complete rotator cuff tear or rupture of left shoulder, not specified as traumatic [M75.122]    SUBJECTIVE  Pain Level (0-10 scale): 1/10  Any medication changes, allergies to medications, adverse drug reactions, diagnosis change, or new procedure performed?: [x] No    [] Yes (see summary sheet for update)  Subjective functional status/changes:   [] No changes reported  Pt states she was very sore last night, she took a pain pill which she usually does not do. OBJECTIVE    Modality rationale: decrease pain to improve the patients ability to perform ADLs.     Min Type Additional Details    [] Estim:  []Unatt       []IFC  []Premod                        []Other:  []w/ice   []w/heat  Position:  Location:    [] Estim: []Att    []TENS instruct  []NMES                    []Other:  []w/US   []w/ice   []w/heat  Position:  Location:    []  Traction: [] Cervical       []Lumbar                       [] Prone          []Supine                       []Intermittent   []Continuous Lbs:  [] before manual  [] after manual    []  Ultrasound: []Continuous   [] Pulsed                           []1MHz   []3MHz W/cm2:  Location:    []  Iontophoresis with dexamethasone         Location: [] Take home patch   [] In clinic   10 [x]  Ice     []  heat  []  Ice massage  []  Laser   []  Anodyne Position: reclinied  Location:  L shoulder    []  Laser with stim  []  Other:  Position:  Location:    []  Vasopneumatic Device Pressure:       [] lo [] med [] hi   Temperature: [] lo [] med [] hi   [] Skin assessment post-treatment:  []intact []redness- no adverse reaction    []redness  adverse reaction:     20 min Therapeutic Exercise:  [x] See flow sheet : Rationale: increase ROM to improve the patients ability to perform ADLs. 10 min Manual Therapy:  Per flow sheet   Rationale: decrease pain, increase ROM and decrease trigger points to increase ease of ADLs. With   [] TE   [] TA   [] neuro   [] other: Patient Education: [x] Review HEP    [] Progressed/Changed HEP based on:   [] positioning   [] body mechanics   [] transfers   [] heat/ice application    [] other:      Other Objective/Functional Measures:      Pain Level (0-10 scale) post treatment: 1/10    ASSESSMENT/Changes in Function: Audible pop occurred during PROM upon returning to neutral from flexion. Pt reported no increased pain with the pop. Patient will continue to benefit from skilled PT services to modify and progress therapeutic interventions, address functional mobility deficits and address ROM deficits to attain remaining goals. []  See Plan of Care  []  See progress note/recertification  []  See Discharge Summary         Progress towards goals / Updated goals:  Long term goals: to be accomplished in 4 weeks  1) Pt's FOTO score will improve > or = 59 indicating improvements in function. At PN: Progressing, 31, an increase of 9. 5/1/17  2) Pt will improve L shoulder PROM flex > or = 140 deg for ease with ADL's. At PN: Progressing, flex 115 degrees. (5/12/17)  Current: Progressing: flex 118 degrees. 5/15/17  4) Pt will demo L shoulder AAROM flex/scap > or = 90 deg for ease with ADL's.   At PN: Per script from MD on 5/9/17, begin AAROM on 5/23/17. (5/12/17)     PLAN  []  Upgrade activities as tolerated     [x]  Continue plan of care  []  Update interventions per flow sheet       []  Discharge due to:_  []  Other:_      Mali Head PTA 5/17/2017  10:32 AM    Future Appointments  Date Time Provider Pat Rosales   5/19/2017 9:30 AM Mali Head PTA [facility]   5/22/2017 9:30 AM [facility] PT [place] [facility]   5/24/2017 9:15 AM IO NURSE VISIT [facility] SCH[facility]   5/25/2017 9:00 AM Pito Brambila, PTA [facility]   5/26/2017 9:00 AM Mali E Laws, PTA [facility]   5/30/2017 8:30 AM Mali E Laws, PTA [facility]   5/30/2017 1:10 PM Gomez Philip MD [facility]   5/31/2017 9:30 AM [facility] PT [place] [facility]   5/31/2017 2:00 PM Rosa Llaams MD [facility]   6/2/2017 9:00 AM Erika Rodriguez [facility]   6/5/2017 10:30 AM Mali E Laws, PTA [facility]   6/7/2017 9:30 AM Mali E Laws, PTA [facility]   6/9/2017 9:30 AM Mali E Laws, PTA [facility]

## 2017-05-19 ENCOUNTER — HOSPITAL ENCOUNTER (OUTPATIENT)
Dept: PHYSICAL THERAPY | Age: 72
Discharge: HOME | End: 2017-05-19
Payer: MEDICARE

## 2017-05-19 PROCEDURE — 97140 MANUAL THERAPY 1/> REGIONS: CPT

## 2017-05-19 PROCEDURE — 97110 THERAPEUTIC EXERCISES: CPT

## 2017-05-19 NOTE — PROGRESS NOTES
PT DAILY TREATMENT NOTE - [facility]     Patient Name: Sheng Galeano  Date:2017  : 1945  [x]  Patient  Verified  Payor: [place] MEDICARE / Plan: [place] MEDICARE PART A / Product Type: Medicare /    In time: 9:20  Out time:9:55  Total Treatment Time (min): 35  Total Timed Codes (min): 25  1:1 Treatment Time ( only): 23   Visit #: 5 of 12    Treatment Area: Complete rotator cuff tear or rupture of left shoulder, not specified as traumatic [M75.122]    SUBJECTIVE  Pain Level (0-10 scale): 1/10  Any medication changes, allergies to medications, adverse drug reactions, diagnosis change, or new procedure performed?: [x] No    [] Yes (see summary sheet for update)  Subjective functional status/changes:   [] No changes reported  Pt states she has been very sore lately. OBJECTIVE    Modality rationale: decrease pain to improve the patients ability to increase ease of ADLs.     Min Type Additional Details    [] Estim:  []Unatt       []IFC  []Premod                        []Other:  []w/ice   []w/heat  Position:  Location:    [] Estim: []Att    []TENS instruct  []NMES                    []Other:  []w/US   []w/ice   []w/heat  Position:  Location:    []  Traction: [] Cervical       []Lumbar                       [] Prone          []Supine                       []Intermittent   []Continuous Lbs:  [] before manual  [] after manual    []  Ultrasound: []Continuous   [] Pulsed                           []1MHz   []3MHz W/cm2:  Location:    []  Iontophoresis with dexamethasone         Location: [] Take home patch   [] In clinic   10 [x]  Ice     []  heat  []  Ice massage  []  Laser   []  Anodyne Position: reclined  Location: L shoulder    []  Laser with stim  []  Other:  Position:  Location:    []  Vasopneumatic Device Pressure:       [] lo [] med [] hi   Temperature: [] lo [] med [] hi   [] Skin assessment post-treatment:  []intact []redness- no adverse reaction    []redness  adverse reaction:     15 min Therapeutic Exercise:  [x] See flow sheet :   Rationale: increase ROM and increase strength to improve the patients ability to perform ADLs. 10 min Manual Therapy:  Per flow sheet   Rationale: decrease pain, increase ROM and increase tissue extensibility to increase ease of ADLs. With   [] TE   [] TA   [] neuro   [] other: Patient Education: [x] Review HEP    [] Progressed/Changed HEP based on:   [] positioning   [] body mechanics   [] transfers   [] heat/ice application    [] other:      Other Objective/Functional Measures: Muscle guarding present PROM. Pain Level (0-10 scale) post treatment: 1/10    ASSESSMENT/Changes in Function: Cont per POC. Patient will continue to benefit from skilled PT services to modify and progress therapeutic interventions, address functional mobility deficits and address ROM deficits to attain remaining goals. []  See Plan of Care  []  See progress note/recertification  []  See Discharge Summary         Progress towards goals / Updated goals:  Long term goals: to be accomplished in 4 weeks  1) Pt's FOTO score will improve > or = 59 indicating improvements in function. At PN: Progressing, 31, an increase of 9. 5/1/17  2) Pt will improve L shoulder PROM flex > or = 140 deg for ease with ADL's. At PN: Progressing, flex 115 degrees. (5/12/17)  Current: Progressing: flex 118 degrees. 5/15/17  4) Pt will demo L shoulder AAROM flex/scap > or = 90 deg for ease with ADL's.   At PN: Per script from MD on 5/9/17, begin AAROM on 5/23/17. (5/12/17)     PLAN  []  Upgrade activities as tolerated     [x]  Continue plan of care  []  Update interventions per flow sheet       []  Discharge due to:_  [x]  Other: Initiated Nickie Santos next week, 5/23/17    Mali Head PTA 5/19/2017  10:10 AM    Future Appointments  Date Time Provider Pat Rosales   5/22/2017 9:30 AM [facility] PT [place] [facility]   5/24/2017 9:15 AM IO NURSE VISIT IO JIN SCHED   5/25/2017 9:00 AM Rubén Ramos PTA [facility]   5/26/2017 9:00 AM Mali E Laws, PTA [facility]   5/30/2017 8:30 AM Mali E Laws, PTA [facility]   5/30/2017 1:10 PM Alexey Martinez MD [facility]   5/31/2017 9:30 AM [facility] PT [place] 1 [facility]   5/31/2017 2:00 PM Florencia Peralta MD [facility] [address]   6/2/2017 9:00 AM Erika Rodriguez [facility]   6/5/2017 10:30 AM Mali E Laws, PTA [facility]   6/7/2017 9:30 AM Mali E Laws, PTA [facility]   6/9/2017 9:30 AM Mali E Laws, PTA [facility]

## 2017-05-22 ENCOUNTER — HOSPITAL ENCOUNTER (OUTPATIENT)
Dept: PHYSICAL THERAPY | Age: 72
Discharge: HOME | End: 2017-05-22
Payer: MEDICARE

## 2017-05-22 PROCEDURE — 97140 MANUAL THERAPY 1/> REGIONS: CPT

## 2017-05-22 PROCEDURE — 97110 THERAPEUTIC EXERCISES: CPT

## 2017-05-22 NOTE — PROGRESS NOTES
PT DAILY TREATMENT NOTE     Patient Name: Renetta Marie  Date:2017  : 1945  [x]  Patient  Verified  Payor: VA MEDICARE / Plan: VA MEDICARE PART A / Product Type: Medicare /    In time:928  Out time:1007  Total Treatment Time (min): 39  Visit #: 6 of 12    Treatment Area: Complete rotator cuff tear or rupture of left shoulder, not specified as traumatic [M75.122]    SUBJECTIVE  Pain Level (0-10 scale): 0  Any medication changes, allergies to medications, adverse drug reactions, diagnosis change, or new procedure performed?: [x] No    [] Yes (see summary sheet for update)  Subjective functional status/changes:   [] No changes reported  Doing fine today    OBJECTIVE    Modality rationale: decrease inflammation and decrease pain to improve the patients ability to tolerate ROM and exercises   Min Type Additional Details    [] Estim:  []Unatt       []IFC  []Premod                        []Other:  []w/ice   []w/heat  Position:  Location:    [] Estim: []Att    []TENS instruct  []NMES                    []Other:  []w/US   []w/ice   []w/heat  Position:  Location:    []  Traction: [] Cervical       []Lumbar                       [] Prone          []Supine                       []Intermittent   []Continuous Lbs:  [] before manual  [] after manual    []  Ultrasound: []Continuous   [] Pulsed                           []1MHz   []3MHz W/cm2:  Location:    []  Iontophoresis with dexamethasone         Location: [] Take home patch   [] In clinic   10 [x]  Ice     []  heat  []  Ice massage  []  Laser   []  Anodyne Position: seated  Location: L shoulder    []  Laser with stim  []  Other:  Position:  Location:    []  Vasopneumatic Device Pressure:       [] lo [] med [] hi   Temperature: [] lo [] med [] hi   [] Skin assessment post-treatment:  [x]intact [x]redness- no adverse reaction    []redness  adverse reaction:      min []Eval                  []Re-Eval       14 min Therapeutic Exercise:  [x] See flow sheet : Rationale: increase ROM to improve the patients ability to prepare for AAROM phase per protocol      15 min Manual Therapy:  PROM L shoulder with gentle oscillations, GH inf and post glides, Scap release, DTM L UT/Levator   Rationale: decrease pain, increase ROM and increase tissue extensibility to decrease compensatory movements and improve posture          With   [] TE   [] TA   [] neuro   [] other: Patient Education: [x] Review HEP    [] Progressed/Changed HEP based on:   [] positioning   [] body mechanics   [] transfers   [] heat/ice application    [] other:      Other Objective/Functional Measures: Cont per protocol with no changes. Pt reports tender jump sign spot on L scapula that has not changed, but overall no pain     Pain Level (0-10 scale) post treatment: 0    ASSESSMENT/Changes in Function: Pt progressing well per protocol with limited pain. Patient will continue to benefit from skilled PT services to address functional mobility deficits, address ROM deficits and analyze and cue movement patterns to attain remaining goals. []  See Plan of Care  []  See progress note/recertification  []  See Discharge Summary         Progress towards goals / Updated goals:  Long term goals: to be accomplished in 4 weeks  1) Pt's FOTO score will improve > or = 59 indicating improvements in function. At PN: Progressing, 31, an increase of 9. 5/1/17  2) Pt will improve L shoulder PROM flex > or = 140 deg for ease with ADL's. At PN: Progressing, flex 115 degrees. (5/12/17)  Current: Progressing: flex 118 degrees. 5/15/17  4) Pt will demo L shoulder AAROM flex/scap > or = 90 deg for ease with ADL's.   At PN: Per script from MD on 5/9/17, begin AAROM on 5/23/17. (5/12/17)     PLAN  []  Upgrade activities as tolerated     [x]  Continue plan of care  [x]  Update interventions per flow sheet NV per protocol      []  Discharge due to:_  []  Other:_      David Anderson, PT 5/22/2017  9:18 AM    Future Appointments  Date Time Provider Pat Rosales   5/22/2017 9:30 AM [facility] PT [place] [facility]   5/24/2017 9:15 AM IO NURSE VISIT [facility] JIN SCHED   5/25/2017 9:00 AM Rhett Greco, PTA [facility]   5/26/2017 9:00 AM Mali E Laws, PTA [facility]   5/30/2017 8:30 AM Mali E Laws, PTA [facility]   5/30/2017 1:10 PM John Calderon MD [facility] SCH[facility]   5/31/2017 9:30 AM [facility] PT [place] [facility]   5/31/2017 2:00 PM Moshe Recio MD [facility] [address]   6/2/2017 9:00 AM Erika Rodriguez [facility]   6/5/2017 10:30 AM Mali E Laws, PTA [facility]   6/7/2017 9:30 AM Mali E Laws, PTA [facility]   6/9/2017 9:30 AM Mali E Laws, PTA [facility]

## 2017-05-24 ENCOUNTER — LAB ONLY (OUTPATIENT)
Dept: INTERNAL MEDICINE CLINIC | Age: 72
End: 2017-05-24

## 2017-05-24 ENCOUNTER — HOSPITAL ENCOUNTER (OUTPATIENT)
Dept: LAB | Age: 72
Discharge: HOME | End: 2017-05-24
Payer: COMMERCIAL

## 2017-05-24 DIAGNOSIS — E78.5 HYPERLIPIDEMIA, UNSPECIFIED HYPERLIPIDEMIA TYPE: Primary | ICD-10-CM

## 2017-05-24 DIAGNOSIS — E78.5 HYPERLIPIDEMIA, UNSPECIFIED HYPERLIPIDEMIA TYPE: ICD-10-CM

## 2017-05-24 LAB
ALBUMIN SERPL BCP-MCNC: 3.9 G/DL (ref 3.4–5)
ALBUMIN/GLOB SERPL: 1.3 {RATIO} (ref 0.8–1.7)
ALP SERPL-CCNC: 130 U/L (ref 45–117)
ALT SERPL-CCNC: 19 U/L (ref 13–56)
ANION GAP BLD CALC-SCNC: 9 MMOL/L (ref 3–18)
AST SERPL W P-5'-P-CCNC: 13 U/L (ref 15–37)
BILIRUB SERPL-MCNC: 0.3 MG/DL (ref 0.2–1)
BUN SERPL-MCNC: 18 MG/DL (ref 7–18)
BUN/CREAT SERPL: 17 (ref 12–20)
CALCIUM SERPL-MCNC: 10 MG/DL (ref 8.5–10.1)
CHLORIDE SERPL-SCNC: 103 MMOL/L (ref 100–108)
CHOLEST SERPL-MCNC: 158 MG/DL
CO2 SERPL-SCNC: 32 MMOL/L (ref 21–32)
CREAT SERPL-MCNC: 1.03 MG/DL (ref 0.6–1.3)
GLOBULIN SER CALC-MCNC: 3.1 G/DL (ref 2–4)
GLUCOSE SERPL-MCNC: 109 MG/DL (ref 74–99)
HDLC SERPL-MCNC: 56 MG/DL (ref 40–60)
HDLC SERPL: 2.8 {RATIO} (ref 0–5)
LDLC SERPL CALC-MCNC: 73.4 MG/DL (ref 0–100)
LIPID PROFILE,FLP: NORMAL
POTASSIUM SERPL-SCNC: 4.6 MMOL/L (ref 3.5–5.5)
PROT SERPL-MCNC: 7 G/DL (ref 6.4–8.2)
SODIUM SERPL-SCNC: 144 MMOL/L (ref 136–145)
TRIGL SERPL-MCNC: 143 MG/DL (ref ?–150)
VLDLC SERPL CALC-MCNC: 28.6 MG/DL

## 2017-05-24 PROCEDURE — 80061 LIPID PANEL: CPT | Performed by: INTERNAL MEDICINE

## 2017-05-24 PROCEDURE — 80053 COMPREHEN METABOLIC PANEL: CPT | Performed by: INTERNAL MEDICINE

## 2017-05-24 PROCEDURE — 36415 COLL VENOUS BLD VENIPUNCTURE: CPT | Performed by: INTERNAL MEDICINE

## 2017-05-25 ENCOUNTER — HOSPITAL ENCOUNTER (OUTPATIENT)
Dept: PHYSICAL THERAPY | Age: 72
Discharge: HOME | End: 2017-05-25
Payer: MEDICARE

## 2017-05-25 PROCEDURE — 97110 THERAPEUTIC EXERCISES: CPT

## 2017-05-25 PROCEDURE — 97140 MANUAL THERAPY 1/> REGIONS: CPT

## 2017-05-25 NOTE — PROGRESS NOTES
PT DAILY TREATMENT NOTE     Patient Name: Rebecca Obregon  Date:2017  : 1945  [x]  Patient  Verified  Payor: [place] MEDICARE / Plan: VA MEDICARE PART A / Product Type: Medicare /    In time: 9:00  Out time:10:03  Total Treatment Time (min): 63  Visit #: 7 of 12    Treatment Area: Complete rotator cuff tear or rupture of left shoulder, not specified as traumatic [M75.122]    SUBJECTIVE  Pain Level (0-10 scale): 2-3/10  Any medication changes, allergies to medications, adverse drug reactions, diagnosis change, or new procedure performed?: [x] No    [] Yes (see summary sheet for update)  Subjective functional status/changes:   [] No changes reported  Pt states was sore yesterday, she may have over done things while doing laundry. OBJECTIVE    Modality rationale: decrease pain to improve the patients ability to perform ADLs.     Min Type Additional Details    [] Estim:  []Unatt       []IFC  []Premod                        []Other:  []w/ice   []w/heat  Position:  Location:    [] Estim: []Att    []TENS instruct  []NMES                    []Other:  []w/US   []w/ice   []w/heat  Position:  Location:    []  Traction: [] Cervical       []Lumbar                       [] Prone          []Supine                       []Intermittent   []Continuous Lbs:  [] before manual  [] after manual    []  Ultrasound: []Continuous   [] Pulsed                           []1MHz   []3MHz W/cm2:  Location:    []  Iontophoresis with dexamethasone         Location: [] Take home patch   [] In clinic   10 [x]  Ice     []  heat  []  Ice massage  []  Laser   []  Anodyne Position:  seated  Location: L shoulder    []  Laser with stim  []  Other:  Position:  Location:    []  Vasopneumatic Device Pressure:       [] lo [] med [] hi   Temperature: [] lo [] med [] hi   [] Skin assessment post-treatment:  []intact []redness- no adverse reaction    []redness  adverse reaction:     43 min Therapeutic Exercise:  [x] See flow sheet : Rationale: increase ROM and increase strength to improve the patients ability to perform ADLs. 10 min Manual Therapy:  Per flow sheet   Rationale: decrease pain, increase ROM and increase tissue extensibility to increase ease of ADLs. With   [] TE   [] TA   [] neuro   [] other: Patient Education: [x] Review HEP    [] Progressed/Changed HEP based on:   [] positioning   [] body mechanics   [] transfers   [] heat/ice application    [] other:      Other Objective/Functional Measures:   Initiated AAROM exercises per script. AAROM on pulleys: flex 100 degrees, scap 90 degrees. Pain Level (0-10 scale) post treatment: 0/10    ASSESSMENT/Changes in Function: Cont per POC. Patient will continue to benefit from skilled PT services to modify and progress therapeutic interventions, address functional mobility deficits, address ROM deficits and address strength deficits to attain remaining goals. []  See Plan of Care  []  See progress note/recertification  []  See Discharge Summary         Progress towards goals / Updated goals:  Long term goals: to be accomplished in 4 weeks  1) Pt's FOTO score will improve > or = 59 indicating improvements in function. At PN: Progressing, 31, an increase of 9. 5/1/17  2) Pt will improve L shoulder PROM flex > or = 140 deg for ease with ADL's. At PN: Progressing, flex 115 degrees. (5/12/17)  Current: Progressing: flex 118 degrees. 5/15/17  4) Pt will demo L shoulder AAROM flex/scap > or = 90 deg for ease with ADL's. At PN: Per script from MD on 5/9/17, begin AAROM on 5/23/17. (5/12/17)  Current: Met, flex 100 degrees, scap 90 degrees. 5/25/17    Updated goal:  1. Increase AAROM to 120 degrees with flex and scap.     PLAN  []  Upgrade activities as tolerated     [x]  Continue plan of care  []  Update interventions per flow sheet       []  Discharge due to:_  []  Other:_      Mali Head, PTA 5/25/2017  10:45 AM    Future Appointments  Date Time Provider Pat Connie   5/26/2017 9:00 AM Mali E Laws, PTA [facility]   5/30/2017 8:30 AM Mali E Laws, PTA [facility]   5/30/2017 1:10 PM Kyra Christie MD [facility]   5/31/2017 9:30 AM [facility]   5/31/2017 2:00 PM Larissa Payan MD [facility] [address]   6/2/2017 9:00 AM Erika Rodriguez [facility]   6/5/2017 10:30 AM Mali E Laws, PTA [facility]   6/7/2017 9:30 AM Mali E Laws, PTA [facility]   6/9/2017 9:30 AM Mali E Laws, PTA [facility]

## 2017-05-26 ENCOUNTER — HOSPITAL ENCOUNTER (OUTPATIENT)
Dept: PHYSICAL THERAPY | Age: 72
Discharge: HOME | End: 2017-05-26
Payer: MEDICARE

## 2017-05-26 PROCEDURE — 97140 MANUAL THERAPY 1/> REGIONS: CPT

## 2017-05-26 PROCEDURE — 97110 THERAPEUTIC EXERCISES: CPT

## 2017-05-26 NOTE — PROGRESS NOTES
PT DAILY TREATMENT NOTE     Patient Name: Ruthy Sims  Date:2017  : 1945  [x]  Patient  Verified  Payor: Kirstin Carver / Plan: [place] MEDICARE PART A / Product Type: Medicare /    In time:8:55  Out time: 9:49  Total Treatment Time (min): 47  Visit #: 8 of 12    Treatment Area: Complete rotator cuff tear or rupture of left shoulder, not specified as traumatic [M75.122]    SUBJECTIVE  Pain Level (0-10 scale): 0/10  Any medication changes, allergies to medications, adverse drug reactions, diagnosis change, or new procedure performed?: [x] No    [] Yes (see summary sheet for update)  Subjective functional status/changes:   [] No changes reported  Pt states she felt good after the new exercises. OBJECTIVE    Modality rationale: decrease pain to improve the patients ability to increase ease of ADLs.     Min Type Additional Details    [] Estim:  []Unatt       []IFC  []Premod                        []Other:  []w/ice   []w/heat  Position:  Location:    [] Estim: []Att    []TENS instruct  []NMES                    []Other:  []w/US   []w/ice   []w/heat  Position:  Location:    []  Traction: [] Cervical       []Lumbar                       [] Prone          []Supine                       []Intermittent   []Continuous Lbs:  [] before manual  [] after manual    []  Ultrasound: []Continuous   [] Pulsed                           []1MHz   []3MHz W/cm2:  Location:    []  Iontophoresis with dexamethasone         Location: [] Take home patch   [] In clinic   10 [x]  Ice     []  heat  []  Ice massage  []  Laser   []  Anodyne Position: reclined  Location: L shoulder    []  Laser with stim  []  Other:  Position:  Location:    []  Vasopneumatic Device Pressure:       [] lo [] med [] hi   Temperature: [] lo [] med [] hi   [] Skin assessment post-treatment:  []intact []redness- no adverse reaction    []redness  adverse reaction:     32 min Therapeutic Exercise:  [x] See flow sheet :   Rationale: increase ROM and increase strength to improve the patients ability to perform ADLs. 12 min Manual Therapy:  Per flow sheet   Rationale: decrease pain, increase ROM and increase tissue extensibility to increase ease of ADLs. With   [] TE   [] TA   [] neuro   [] other: Patient Education: [x] Review HEP    [] Progressed/Changed HEP based on:   [] positioning   [] body mechanics   [] transfers   [] heat/ice application    [] other:      Other Objective/Functional Measures: Pt reaches 90 degrees of scaption with ease, empty end feel. Pain Level (0-10 scale) post treatment: 0/10    ASSESSMENT/Changes in Function: Cont per POC. Patient will continue to benefit from skilled PT services to modify and progress therapeutic interventions, address functional mobility deficits, address ROM deficits and address strength deficits to attain remaining goals. []  See Plan of Care  []  See progress note/recertification  []  See Discharge Summary         Progress towards goals / Updated goals:  Long term goals: to be accomplished in 4 weeks  1) Pt's FOTO score will improve > or = 59 indicating improvements in function. At PN: Progressing, 31, an increase of 9. 5/1/17  2) Pt will improve L shoulder PROM flex > or = 140 deg for ease with ADL's. At PN: Progressing, flex 115 degrees. (5/12/17)  Current: Progressing: flex 118 degrees. 5/15/17  4) Pt will demo L shoulder AAROM flex/scap > or = 90 deg for ease with ADL's. At PN: Per script from MD on 5/9/17, begin AAROM on 5/23/17. (5/12/17)  Current: Met, flex 100 degrees, scap 90 degrees. 5/25/17     Updated goal:  1. Increase AAROM to 120 degrees with flex and scap.     PLAN  []  Upgrade activities as tolerated     [x]  Continue plan of care  []  Update interventions per flow sheet       []  Discharge due to:_  []  Other:_      Mali Head PTA 5/26/2017  9:20 AM    Future Appointments  Date Time Provider Pat Rosales   5/30/2017 8:30 AM Mali Head PTA [facility] 5/30/2017 1:10 PM Carlito Farah MD [facility]   5/31/2017 2:00 PM Yolanda Esparza MD [facility]   5/31/2017 3:30 PM Nicol Harris PTA [facility]   6/2/2017 9:00 AM Bruno Rina [facility]   6/5/2017 10:30 AM Mali Head, PTA [facility]   6/7/2017 9:30 AM Mali Head, PTA [facility]   6/9/2017 9:30 AM Mali Head, PTA [facility]

## 2017-05-30 ENCOUNTER — HOSPITAL ENCOUNTER (OUTPATIENT)
Dept: PHYSICAL THERAPY | Age: 72
Discharge: HOME | End: 2017-05-30
Payer: MEDICARE

## 2017-05-30 ENCOUNTER — OFFICE VISIT (OUTPATIENT)
Dept: ORTHOPEDIC SURGERY | Age: 72
End: 2017-05-30

## 2017-05-30 VITALS
BODY MASS INDEX: 38.41 KG/M2 | DIASTOLIC BLOOD PRESSURE: 60 MMHG | SYSTOLIC BLOOD PRESSURE: 111 MMHG | WEIGHT: 225 LBS | TEMPERATURE: 97.8 F | HEIGHT: 64 IN | HEART RATE: 76 BPM

## 2017-05-30 DIAGNOSIS — M75.122 COMPLETE TEAR OF LEFT ROTATOR CUFF: Primary | ICD-10-CM

## 2017-05-30 PROCEDURE — 97110 THERAPEUTIC EXERCISES: CPT

## 2017-05-30 PROCEDURE — 97140 MANUAL THERAPY 1/> REGIONS: CPT

## 2017-05-30 RX ORDER — HYDROCODONE BITARTRATE AND ACETAMINOPHEN 5; 325 MG/1; MG/1
1 TABLET ORAL
Qty: 40 TAB | Refills: 0 | Status: SHIPPED | OUTPATIENT
Start: 2017-05-30 | End: 2017-05-31 | Stop reason: ALTCHOICE

## 2017-05-30 NOTE — PROGRESS NOTES
Mariah Ordoñez  1945     HISTORY OF PRESENT ILLNESS  Mariah Ordoñez is a 70 y.o. female who presents today for evaluation s/p left shoulder arthroscopic rotator cuff repair and subscapularis repair on 4/19/17. Patient has been going to PT. Describes pain as a 0/10. Has been taking percocet rarely for pain. Still has night pain. Presents to the office wearing her sling. Yesterday she notes she had some burning pain in the left arm. Patient denies any fever, chills, chest pain, shortness of breath or calf pain. There are no new illness or injuries to report since last seen in the office. PHYSICAL EXAM:   Visit Vitals    /60    Pulse 76    Temp 97.8 °F (36.6 °C) (Oral)    Ht 5' 4\" (1.626 m)    Wt 225 lb (102.1 kg)    BMI 38.62 kg/m2      The patient is a well-developed, well-nourished female in no acute distress. The patient is alert and oriented times three. The patient appears to be well groomed. Mood and affect are normal.  ORTHOPEDIC EXAM of left shoulder:  Inspection: swelling not present,  Bruising not present  Incisions well healed  Passive glenohumeral abduction 0-70 degrees  Stability: Stable  Strength: n/a  2+ distal pulses    IMPRESSION:  S/P Left shoulder arthroscopic rotator cuff repair and subscapularis repair    PLAN:   She may discontinue wearing the sling. She will continue PT working on active ROM. She will be given a prescription for Norco-5. She can return in one month for reevaluation. Scribed by Kaylie Garland ([facility]) as dictated by Kyra Christie MD    I, Dr. Kyra Christie, confirm that all documentation is accurate.     Kyra Christie M.D.   [facility]

## 2017-05-30 NOTE — PROGRESS NOTES
PT DAILY TREATMENT NOTE     Patient Name: Yuo Griggs  Date:2017  : 1945  [x]  Patient  Verified  Payor: Serena Host / Plan: [place] MEDICARE PART A / Product Type: Medicare /    In time: 8:20  Out time: 9:15  Total Treatment Time (min): 55  Visit #: 9 of 12    Treatment Area: Complete rotator cuff tear or rupture of left shoulder, not specified as traumatic [M75.122]    SUBJECTIVE  Pain Level (0-10 scale): 1/10  Any medication changes, allergies to medications, adverse drug reactions, diagnosis change, or new procedure performed?: [x] No    [] Yes (see summary sheet for update)  Subjective functional status/changes:   [] No changes reported  Pt states she has been having a burning feeling in her arm. She states the only thing she can think of that she did was reach for toast.     OBJECTIVE    Modality rationale: decrease pain to improve the patients ability to increase ease of ADLs.     Min Type Additional Details    [] Estim:  []Unatt       []IFC  []Premod                        []Other:  []w/ice   []w/heat  Position:  Location:    [] Estim: []Att    []TENS instruct  []NMES                    []Other:  []w/US   []w/ice   []w/heat  Position:  Location:    []  Traction: [] Cervical       []Lumbar                       [] Prone          []Supine                       []Intermittent   []Continuous Lbs:  [] before manual  [] after manual    []  Ultrasound: []Continuous   [] Pulsed                           []1MHz   []3MHz W/cm2:  Location:    []  Iontophoresis with dexamethasone         Location: [] Take home patch   [] In clinic   10 [x]  Ice     []  heat  []  Ice massage  []  Laser   []  Anodyne Position: reclined  Location: L shoulder    []  Laser with stim  []  Other:  Position:  Location:    []  Vasopneumatic Device Pressure:       [] lo [] med [] hi   Temperature: [] lo [] med [] hi   [] Skin assessment post-treatment:  []intact []redness- no adverse reaction    []redness  adverse reaction: 35 min Therapeutic Exercise:  [x] See flow sheet :   Rationale: increase ROM and increase strength to improve the patients ability to perform ADLs. 10 min Manual Therapy:  Per flow sheet   Rationale: decrease pain, increase ROM and increase tissue extensibility to increase ease of ADLs. With   [] TE   [] TA   [] neuro   [] other: Patient Education: [x] Review HEP    [] Progressed/Changed HEP based on:   [] positioning   [] body mechanics   [] transfers   [] heat/ice application    [] other:      Other Objective/Functional Measures:  AAROM on pulleys: 109 degrees of flex. Pain Level (0-10 scale) post treatment: 1/10    ASSESSMENT/Changes in Function: Cont per POC. Patient will continue to benefit from skilled PT services to modify and progress therapeutic interventions, address functional mobility deficits, address ROM deficits and address strength deficits to attain remaining goals. []  See Plan of Care  []  See progress note/recertification  []  See Discharge Summary         Progress towards goals / Updated goals:  Long term goals: to be accomplished in 4 weeks  1) Pt's FOTO score will improve > or = 59 indicating improvements in function. At PN: Progressing, 31, an increase of 9. 5/1/17  2) Pt will improve L shoulder PROM flex > or = 140 deg for ease with ADL's. At PN: Progressing, flex 115 degrees. (5/12/17)  Current: Progressing: flex 118 degrees. 5/15/17  4) Pt will demo L shoulder AAROM flex/scap > or = 90 deg for ease with ADL's. At PN: Per script from MD on 5/9/17, begin AAROM on 5/23/17. (5/12/17)  Current: Met, flex 100 degrees, scap 90 degrees. 5/25/17      Updated goal:  1. Increase AAROM to 120 degrees with flex and scap. Current: Progressing, 109 degrees.   5/30/17    PLAN  []  Upgrade activities as tolerated     [x]  Continue plan of care  []  Update interventions per flow sheet       []  Discharge due to:_  []  Other:_      Mali Head, PTA 5/30/2017  8:40 AM    Future Appointments  Date Time Provider Pat Rosales   5/30/2017 1:10 PM [facility]   5/31/2017 2:00 PM Jluis Stewart MD [facility] [address]   5/31/2017 3:30 PM Arianne Antoine, SYLVIA [facility]   6/2/2017 9:00 AM Austin Dye [facility]   6/5/2017 10:30 AM Malijob Head, PTA [facility]   6/7/2017 9:30 AM Mali ALEXANDER Head, PTA [facility]   6/9/2017 9:30 AM Mali E Laws, PTA [facility]

## 2017-05-31 ENCOUNTER — OFFICE VISIT (OUTPATIENT)
Dept: INTERNAL MEDICINE CLINIC | Age: 72
End: 2017-05-31

## 2017-05-31 ENCOUNTER — HOSPITAL ENCOUNTER (OUTPATIENT)
Dept: PHYSICAL THERAPY | Age: 72
Discharge: HOME | End: 2017-05-31
Payer: MEDICARE

## 2017-05-31 VITALS
HEIGHT: 64 IN | SYSTOLIC BLOOD PRESSURE: 130 MMHG | WEIGHT: 227 LBS | OXYGEN SATURATION: 98 % | RESPIRATION RATE: 14 BRPM | BODY MASS INDEX: 38.76 KG/M2 | HEART RATE: 80 BPM | DIASTOLIC BLOOD PRESSURE: 66 MMHG | TEMPERATURE: 98.2 F

## 2017-05-31 DIAGNOSIS — J44.9 ASTHMA WITH COPD (HCC): ICD-10-CM

## 2017-05-31 DIAGNOSIS — E78.5 HYPERLIPIDEMIA LDL GOAL <100: ICD-10-CM

## 2017-05-31 DIAGNOSIS — R73.01 IMPAIRED FASTING GLUCOSE: ICD-10-CM

## 2017-05-31 DIAGNOSIS — Z00.00 ROUTINE GENERAL MEDICAL EXAMINATION AT A HEALTH CARE FACILITY: Primary | ICD-10-CM

## 2017-05-31 PROCEDURE — 97110 THERAPEUTIC EXERCISES: CPT

## 2017-05-31 PROCEDURE — 97140 MANUAL THERAPY 1/> REGIONS: CPT

## 2017-05-31 NOTE — PROGRESS NOTES
PT DAILY TREATMENT NOTE     Patient Name: Mark Fitzpatrick  Date:2017  : 1945  [x]  Patient  Verified  Payor: VA MEDICARE / Plan: VA MEDICARE PART A / Product Type: Medicare /    In time:3:00  Out time:4:01  Total Treatment Time (min): 61  Visit #: 1 of 12    Treatment Area: Complete rotator cuff tear or rupture of left shoulder, not specified as traumatic [M75.122]    SUBJECTIVE  Pain Level (0-10 scale): 1/10  Any medication changes, allergies to medications, adverse drug reactions, diagnosis change, or new procedure performed?: [x] No    [] Yes (see summary sheet for update)  Subjective functional status/changes:   [] No changes reported  \"I just have some soreness in the shoulder. \"    OBJECTIVE    Modality rationale: decrease edema, decrease inflammation and decrease pain to improve the patients ability to perform ADLs with less pain.     Min Type Additional Details    [] Estim:  []Unatt       []IFC  []Premod                        []Other:  []w/ice   []w/heat  Position:  Location:    [] Estim: []Att    []TENS instruct  []NMES                    []Other:  []w/US   []w/ice   []w/heat  Position:  Location:    []  Traction: [] Cervical       []Lumbar                       [] Prone          []Supine                       []Intermittent   []Continuous Lbs:  [] before manual  [] after manual    []  Ultrasound: []Continuous   [] Pulsed                           []1MHz   []3MHz W/cm2:  Location:    []  Iontophoresis with dexamethasone         Location: [] Take home patch   [] In clinic   10 [x]  Ice     []  heat  []  Ice massage  []  Laser   []  Anodyne Position: longsitting  Location: Left shoulder    []  Laser with stim  []  Other:  Position:  Location:    []  Vasopneumatic Device Pressure:       [] lo [] med [] hi   Temperature: [] lo [] med [] hi   [x] Skin assessment post-treatment:  [x]intact []redness- no adverse reaction    []redness  adverse reaction:     40 min Therapeutic Exercise:  [x] See flow sheet :   Rationale: increase ROM and increase strength to improve the patients ability to perform ADLs with less pain. 11 min Manual Therapy:  [address] mobs inferior within available range, PROM Flex, abd and ER   Rationale: decrease pain, increase ROM and increase tissue extensibility to improve the patient's ability to perform ADLs with less pain. With   [] TE   [] TA   [] neuro   [] other: Patient Education: [x] Review HEP    [] Progressed/Changed HEP based on:   [] positioning   [] body mechanics   [] transfers   [] heat/ice application    [] other:      Other Objective/Functional Measures:      Pain Level (0-10 scale) post treatment: 0/10    ASSESSMENT/Changes in Function: No palpable tenderness over incision sites but very sensitive with PROM. Pt received CP post tx. Patient will continue to benefit from skilled PT services to modify and progress therapeutic interventions, address functional mobility deficits, address ROM deficits, address strength deficits, analyze and cue movement patterns and analyze and modify body mechanics/ergonomics to attain remaining goals. []  See Plan of Care  []  See progress note/recertification  []  See Discharge Summary         Progress towards goals / Updated goals:  Long term goals: to be accomplished in 4 weeks  1) Pt's FOTO score will improve > or = 59 indicating improvements in function. At PN: Progressing, 31, an increase of 9. 5/1/17  2) Pt will improve L shoulder PROM flex > or = 140 deg for ease with ADL's. At PN: Progressing, flex 115 degrees. (5/12/17)  Current: Progressing: flex 118 degrees. 5/15/17  4) Pt will demo L shoulder AAROM flex/scap > or = 90 deg for ease with ADL's. At PN: Per script from MD on 5/9/17, begin AAROM on 5/23/17. (5/12/17)  Current: Met, flex 100 degrees, scap 90 degrees.  5/25/17        PLAN  []  Upgrade activities as tolerated     [x]  Continue plan of care  []  Update interventions per flow sheet       [] Discharge due to:_  []  Other:_      Raji Rincon 5/31/2017  3:20 PM    Future Appointments  Date Time Provider Pat Connie   6/2/2017 9:00 AM [place] [facility]   6/5/2017 10:30 AM Mali E Laws, PTA [facility]   6/7/2017 9:30 AM Mali E Laws, PTA [facility]   6/9/2017 9:30 AM Mali E Laws, PTA [facility]   6/12/2017 9:30 AM Mali E Laws, PTA [facility]   6/14/2017 9:30 AM Mali E Laws, PTA [facility]   6/16/2017 11:00 AM [facility]   6/19/2017 10:00 AM Mali E Laws, PTA [facility]   6/21/2017 9:30 AM Mali E Laws, PTA [facility]   6/23/2017 9:30 AM Mali E Laws, PTA [facility]   6/27/2017 11:30 AM Audrey Willis, PTA [facility]   6/27/2017 1:00 PM TATO PlummerMD [facility] SCH[facility]   6/28/2017 9:30 AM Mali E Laws, PTA [facility]   6/30/2017 9:30 AM Ruddy Holman, PTA [facility]   11/17/2017 9:15 AM [facility] NURSE VISIT [facility]   12/1/2017 2:00 PM Jaimee Perdomo MD [facility]

## 2017-05-31 NOTE — PROGRESS NOTES
Chucky Thomas 1945, is a 70 y.o. female, who is seen today for a routine physical exam and follow-up on impaired fasting glucose obesity hyperlipidemia thyroid nodule asthma hypertension DJD and other issues. She had gastric bypass surgery several years ago at the time when she weighed 270 and she lost a lot of weight but has regained 50% of that weight and is very disappointed. She is trying to work on her diet but says she probably could do somewhat better. She takes her medicine regularly. Her breathing is doing well with occasional use of albuterol. Her pressures been controlled on current medicine. She does have some arthritis and uses tramadol for that. She takes simvastatin regularly for her lipids. Past Medical History:   Diagnosis Date    Asthma with COPD ([address]     Bilateral pulmonary embolism ([address] 09/2015    Unprovoked. Negative hypercoaguable workup. Completed 6 months of Xarelto.  Cervical spondylosis     Colon adenoma     CTS (carpal tunnel syndrome)     Cystitis cystica 02/2016    Intermittent symptomatic UTIs. Evaluation by Dr. Ceci Benjamin. Negative abdom. CT scan and renal ultrasound. Cystoscopy showing diffuse cystitis cystica.  Family history of colon cancer     GERD (gastroesophageal reflux disease)     Hypertension     Labyrinthitis     Left sided sciatica     Lumbar spondylosis     Microhematuria     Multiple pulmonary nodules determined by computed tomography of lung 03/2016    Bilateral. 3-5 mm in size. Dr. Shannan Howell.  Osteopenia     Prediabetes     Status post gastric bypass for obesity     Thyroid nodule 11/2015    Thyroid ultrasound with dominant 2.7 cm solid nodule mid-lower left lobe. Dr. Dorian Marmolejo. FNA with benign cytology.       Past Surgical History:   Procedure Laterality Date    APPENDECTOMY      HX APPENDECTOMY      HX CARPAL TUNNEL RELEASE      twice on left, once on right    HX CHOLECYSTECTOMY      HX GASTRIC BYPASS      HX GASTRIC BYPASS      HX HEMORRHOIDECTOMY      HX HERNIA REPAIR      HX HYSTERECTOMY      HX SHOULDER ARTHROSCOPY Left     LAP,CHOLECYSTECTOMY       Current Outpatient Prescriptions   Medication Sig Dispense Refill    losartan (COZAAR) 25 mg tablet Take 1 Tab by mouth daily. 90 Tab 3    hydroCHLOROthiazide (HYDRODIURIL) 25 mg tablet Take 1 Tab by mouth daily. 90 Tab 1    cranberry extract 450 mg tab Take  by mouth.  simvastatin (ZOCOR) 20 mg tablet TAKE ONE TABLET BY MOUTH IN THE EVENING 90 Tab 3    cyclobenzaprine (FLEXERIL) 10 mg tablet Take 1 Tab by mouth three (3) times daily as needed for Muscle Spasm(s). 30 Tab 0    traMADol (ULTRAM) 50 mg tablet Take 1 Tab by mouth every six (6) hours as needed for Pain. 50 Tab 0    ondansetron hcl (ZOFRAN) 4 mg tablet Take 1 Tab by mouth every eight (8) hours as needed. 40 Tab 1    albuterol (PROVENTIL HFA, VENTOLIN HFA, PROAIR HFA) 90 mcg/actuation inhaler Take 1-2 Puffs by inhalation every four (4) hours as needed for Wheezing. 1 Inhaler 3    inhalational spacing device 1 Each by Does Not Apply route as needed. 1 Device 0    cholecalciferol, vitamin d3, (VITAMIN D) 1,000 unit tablet Take 1,000 Units by mouth two (2) times a day.  cyanocobalamin (VITAMIN B-12) 500 mcg tablet Take 500 mcg by mouth daily.  CALCIUM CARBONATE/VITAMIN D3 (OS- + D PO) Take 1 Cap by mouth two (2) times a day.  POLYETHYLENE GLYCOL 3350 (MIRALAX PO) Take  by mouth nightly.  omeprazole (PRILOSEC) 20 mg capsule Take 20 mg by mouth daily. Allergies   Allergen Reactions    Macrobid [Nitrofurantoin Monohyd/M-Cryst] Anaphylaxis    Aspirin Unknown (comments)     Cannot take due to gastric bypass.     Lisinopril Cough    Parafon Forte Dsc [Chlorzoxazone] Other (comments)     Excessive drowsiness     Social History     Social History    Marital status:      Spouse name: N/A    Number of children: N/A    Years of education: N/A     Social History Main Topics    Smoking status: Former Smoker     Packs/day: 3.00     Years: 20.00     Types: Cigarettes     Start date: 9/16/1963     Quit date: 1/5/1983    Smokeless tobacco: Never Used    Alcohol use No    Drug use: No    Sexual activity: No     Other Topics Concern    None     Social History Narrative     Visit Vitals    /66    Pulse 80    Temp 98.2 °F (36.8 °C) (Oral)    Resp 14    Ht 5' 4\" (1.626 m)    Wt 227 lb (103 kg)    SpO2 98%    BMI 38.96 kg/m2     The patient is a well-developed well-nourished female in no apparent distress. HEENT: Pupils are equal and react to light and extraocular movements are full. Ear canals and tympanic membranes appear normal. Oral cavity appears normal with no oral lesions. Neck: Carotids are 2+ without bruits. No adenopathy or thyromegaly. Lungs are clear to percussion. I hear no wheezing, rales or rhonchi. Heart reveals a regular rhythm with no murmur, gallop, click or rub. The apical impulse is in the fifth interspace at the midclavicular line. Abdomen is soft and nontender with no hepatosplenomegaly or masses. Bowel sounds are normoactive and there is no distention or tympany. Extremities reveal no clubbing cyanosis or edema. Pulses are 2+. Skin reveals no suspicious skin growths. Breasts reveal no masses, skin or nipple abnormalities. No axillary adenopathy.     Results for orders placed or performed during the hospital encounter of 40/47/91   METABOLIC PANEL, COMPREHENSIVE   Result Value Ref Range    Sodium 144 136 - 145 mmol/L    Potassium 4.6 3.5 - 5.5 mmol/L    Chloride 103 100 - 108 mmol/L    CO2 32 21 - 32 mmol/L    Anion gap 9 3.0 - 18 mmol/L    Glucose 109 (H) 74 - 99 mg/dL    BUN 18 7.0 - 18 MG/DL    Creatinine 1.03 0.6 - 1.3 MG/DL    BUN/Creatinine ratio 17 12 - 20      GFR est AA >60 >60 ml/min/1.73m2    GFR est non-AA 53 (L) >60 ml/min/1.73m2    Calcium 10.0 8.5 - 10.1 MG/DL    Bilirubin, total 0.3 0.2 - 1.0 MG/DL    ALT (SGPT) 19 13 - 56 U/L AST (SGOT) 13 (L) 15 - 37 U/L    Alk. phosphatase 130 (H) 45 - 117 U/L    Protein, total 7.0 6.4 - 8.2 g/dL    Albumin 3.9 3.4 - 5.0 g/dL    Globulin 3.1 2.0 - 4.0 g/dL    A-G Ratio 1.3 0.8 - 1.7     LIPID PANEL   Result Value Ref Range    LIPID PROFILE          Cholesterol, total 158 <200 MG/DL    Triglyceride 143 <150 MG/DL    HDL Cholesterol 56 40 - 60 MG/DL    LDL, calculated 73.4 0 - 100 MG/DL    VLDL, calculated 28.6 MG/DL    CHOL/HDL Ratio 2.8 0 - 5.0       Assessment: #1. Hyperlipidemia doing well. She will continue simvastatin 20 mg each evening and low-fat diet. #2.  Obesity down 7 pounds in the last 6 months but she would like to lose more weight again. She would like to at least get down below 200 and will continue dieting and try to remain active in that endeavor. #3.  2 thyroid nodules, that is being monitored. #4. Hypertension is well controlled. Will continue hydrochlorothiazide 25 mg daily and losartan 25 mg daily. #5.  Impaired fasting glucose. She will continue working on weight loss and avoid sweets in the diet. #6.  History of asthma doing well currently with no wheezing. She will use albuterol as needed. Follow-up in 6 months with lab or sooner if needed    Sam Zaman MD FACP    Please note: This document has been produced using voice recognition software. Unrecognized errors in transcription may be present.

## 2017-05-31 NOTE — MR AVS SNAPSHOT
Visit Information Date & Time Provider Department Dept. Phone Encounter #  
 5/31/2017  2:00 PM Genia Edwards MD Internist of [facility] [id number] Your Appointments 6/27/2017  1:00 PM  
Follow Up with [facility] [place] [facility]) Appt Note: lt shdr 4 wk fu 35.00 copay 2012 [facility] [address]  
[phone]  
  
   
 [address] [facility] [address]  
  
    
 12/1/2017  2:00 PM  
Office Visit with Genia Edwards MD  
[facility]ist of [facility] Appt Note: 6 month f/u  
 [address] [facility], [address] [address] [place] [address] [place]  
  
   
 [address], [address] Upcoming Health Maintenance Date Due INFLUENZA AGE 9 TO ADULT 8/1/2017 MEDICARE YEARLY EXAM 11/24/2017 GLAUCOMA SCREENING Q2Y 6/1/2018 BREAST CANCER SCRN MAMMOGRAM 11/3/2018 COLONOSCOPY 10/10/2023 DTaP/Tdap/Td series (2 - Td) 11/25/2026 Allergies as of 5/31/2017  Review Complete On: 5/31/2017 By: Genia Edwards MD  
  
 Severity Noted Reaction Type Reactions Macrobid [Nitrofurantoin Monohyd/m-cryst] High 09/25/2015   Not Verified Anaphylaxis Aspirin  07/16/2015    Unknown (comments) Cannot take due to gastric bypass. Lisinopril  08/12/2014    Cough Parafon Forte Dsc [Chlorzoxazone]  10/20/2011   Side Effect Other (comments) Excessive drowsiness Current Immunizations  Reviewed on 8/12/2014 Name Date H1N1 FLU VACCINE 11/3/2009 Influenza High Dose Vaccine PF 10/15/2016, 10/14/2015, 10/7/2014 Influenza Vaccine PF 10/30/2013 Influenza Vaccine Split 11/6/2012, 11/9/2011 10:35 AM  
 Influenza Vaccine Whole 9/30/2010 Pneumococcal Conjugate (PCV-13) 8/17/2015  8:01 AM  
 Pneumococcal Vaccine (Unspecified Type) 9/30/2010 Td 1/1/2007 Tdap 11/25/2016 Zoster Vaccine, Live 1/1/2009 Not reviewed this visit You Were Diagnosed With   
  
 Codes Comments Routine general medical examination at a health care facility    -  Primary ICD-10-CM: Z00.00 ICD-9-CM: V70.0 Vitals BP Pulse Temp Resp Height(growth percentile) Weight(growth percentile) 130/66 80 98.2 °F (36.8 °C) (Oral) 14 5' 4\" (1.626 m) 227 lb (103 kg) SpO2 BMI OB Status Smoking Status 98% 38.96 kg/m2 Hysterectomy Former Smoker Vitals History BMI and BSA Data Body Mass Index Body Surface Area  
 38.96 kg/m 2 2.16 m 2 Preferred Pharmacy Pharmacy Name Phone [facility] [address] [place] [phone] Your Updated Medication List  
  
   
This list is accurate as of: 5/31/17  2:00 PM.  Always use your most recent med list.  
  
  
  
  
 albuterol 90 mcg/actuation inhaler Commonly known as:  PROVENTIL HFA, VENTOLIN HFA, PROAIR HFA Take 1-2 Puffs by inhalation every four (4) hours as needed for Wheezing. cranberry extract 450 mg Tab Take  by mouth. cyclobenzaprine 10 mg tablet Commonly known as:  FLEXERIL Take 1 Tab by mouth three (3) times daily as needed for Muscle Spasm(s). hydroCHLOROthiazide 25 mg tablet Commonly known as:  HYDRODIURIL Take 1 Tab by mouth daily. inhalational spacing device 1 Each by Does Not Apply route as needed. losartan 25 mg tablet Commonly known as:  COZAAR Take 1 Tab by mouth daily. MIRALAX PO Take  by mouth nightly. ondansetron hcl 4 mg tablet Commonly known as:  [place] Dupes Take 1 Tab by mouth every eight (8) hours as needed. OS- + D PO Take 1 Cap by mouth two (2) times a day. PriLOSEC 20 mg capsule Generic drug:  omeprazole Take 20 mg by mouth daily. simvastatin 20 mg tablet Commonly known as:  ZOCOR  
TAKE ONE TABLET BY MOUTH IN THE EVENING  
  
 traMADol 50 mg tablet Commonly known as:  Curt Alexander  
 Take 1 Tab by mouth every six (6) hours as needed for Pain. VITAMIN B-12 500 mcg tablet Generic drug:  cyanocobalamin Take 500 mcg by mouth daily. VITAMIN D3 1,000 unit tablet Generic drug:  cholecalciferol Take 1,000 Units by mouth two (2) times a day. To-Do List   
 05/31/2017 3:30 PM  
  Appointment with Linda Bradley PTA at [facility]  [address] [facility] ([phone])  
  
 06/02/2017 9:00 AM  
  Appointment with Chidi Arnulfo at [address] [facility] [address] [facility] ([phone])  
  
 06/05/2017 10:30 AM  
  Appointment with Araceli Newby PTA at [facility]  [address] [facility] ([phone])  
  
 06/07/2017  9:30 AM  
  Appointment with Araceli Newby PTA at [facility]  [address]  ([phone])  
  
 06/09/2017 9:30 AM  
  Appointment with Araceli Newby PTA at [facility]  [address] [facility] ([phone]) Introducing hospitals & HEALTH SERVICES! [employer] introduces [employer] patient portal. Now you can access parts of your medical record, email your doctor's office, and request medication refills online. 1. In your internet browser, go to [email or web address]. [email or web address] 2. Click on the First Time User? Click Here link in the Sign In box. You will see the New Member Sign Up page. 3. Enter your [employer] Access Code exactly as it appears below. You will not need to use this code after youve completed the sign-up process. If you do not sign up before the expiration date, you must request a new code. · [employer] Access Code: [email or web address] Expires: 6/18/2017  2:06 PM 
 
4. Enter the last four digits of your Social Security Number (xxxx) and Date of Birth (mm/dd/yyyy) as indicated and click Submit. You will be taken to the next sign-up page. 5. Create a MyChart ID. This will be your [employer] login ID and cannot be changed, so think of one that is secure and easy to remember. 6. Create a [employer] password. You can change your password at any time. 7. Enter your Password Reset Question and Answer.  This can be used at a later time if you forget your password. 8. Enter your e-mail address. You will receive e-mail notification when new information is available in [address] 9. Click Sign Up. You can now view and download portions of your medical record. 10. Click the Download Summary menu link to download a portable copy of your medical information. If you have questions, please visit the Frequently Asked Questions section of the [employer] website. Remember, [employer] is NOT to be used for urgent needs. For medical emergencies, dial 911. Now available from your iPhone and Android! Please provide this summary of care documentation to your next provider. Your primary care clinician is listed as Adriana Pino. Danya Bonilla. If you have any questions after today's visit, please call [phone].

## 2017-06-02 ENCOUNTER — HOSPITAL ENCOUNTER (OUTPATIENT)
Dept: PHYSICAL THERAPY | Age: 72
Discharge: HOME | End: 2017-06-02
Payer: COMMERCIAL

## 2017-06-02 PROCEDURE — 97140 MANUAL THERAPY 1/> REGIONS: CPT

## 2017-06-02 PROCEDURE — 97110 THERAPEUTIC EXERCISES: CPT

## 2017-06-02 NOTE — PROGRESS NOTES
PT DAILY TREATMENT NOTE - [facility]    Patient Name: Logan Rodas  Date:2017  : 1945  [x]  Patient  Verified  Payor: [employer] / Plan: [place] / Product Type: Medicare /    In time:8:58  Out time:9:54  Total Treatment Time (min): 56  Total Timed Codes (min): 46  1:1 Treatment Time ( [address] only): 55   Visit #: 2 of 12    Treatment Area: Complete rotator cuff tear or rupture of left shoulder, not specified as traumatic [M75.122]    SUBJECTIVE  Pain Level (0-10 scale): 0  Any medication changes, allergies to medications, adverse drug reactions, diagnosis change, or new procedure performed?: [x] No    [] Yes (see summary sheet for update)  Subjective functional status/changes:   [] No changes reported  Patient reports that her doctor removed her sling on Tuesday.      OBJECTIVE  Modality rationale: decrease pain to improve the patients ability to ease ADL tolerance   Min Type Additional Details    [] Estim:  []Unatt       []IFC  []Premod                        []Other:  []w/ice   []w/heat  Position:  Location:    [] Estim: []Att    []TENS instruct  []NMES                    []Other:  []w/US   []w/ice   []w/heat  Position:  Location:    []  Traction: [] Cervical       []Lumbar                       [] Prone          []Supine                       []Intermittent   []Continuous Lbs:  [] before manual  [] after manual    []  Ultrasound: []Continuous   [] Pulsed                           []1MHz   []3MHz Location:  W/cm2:    []  Iontophoresis with dexamethasone         Location: [] Take home patch   [] In clinic   10 [x]  Ice     []  heat  []  Ice massage  []  Laser   []  Anodyne Position: seated  Location: left shoulder    []  Laser with stim  []  Other: Position:  Location:    []  Vasopneumatic Device Pressure:       [] lo [] med [] hi   Temperature: [] lo [] med [] hi   [x] Skin assessment post-treatment:  [x]intact []redness- no adverse reaction    []redness  adverse reaction:     36 min Therapeutic Exercise:  [x] See flow sheet :   Rationale: increase ROM, increase strength and improve coordination to improve the patients ability to perform ADLs with less pain    10 min Manual Therapy:  PROM to left shoulder (gentle ER), grade II GHJ posterior mobs in flexion   Rationale: decrease pain, increase ROM and increase tissue extensibility to perform ADLs with less pain            With   [] TE   [] TA   [] neuro   [] other: Patient Education: [x] Review HEP    [] Progressed/Changed HEP based on:   [] positioning   [] body mechanics   [] transfers   [] heat/ice application    [] other:      Other Objective/Functional Measures:   FOTO score of 47  Cues to decrease muscle guarding during PROM,TTP over incision sites  Cues to decrease UT activation with scap squeezes/tactile cues to promote activation of MT/rhomboids      Pain Level (0-10 scale) post treatment: 0    ASSESSMENT/Changes in Function: Patient is gradually progressing towards goals, FOTO score increase of 25 points. Will continue to focus on increasing ROM. Patient will continue to benefit from skilled PT services to modify and progress therapeutic interventions, address functional mobility deficits, address ROM deficits, address strength deficits, analyze and address soft tissue restrictions, analyze and cue movement patterns, analyze and modify body mechanics/ergonomics and assess and modify postural abnormalities to attain remaining goals. []  See Plan of Care  []  See progress note/recertification  []  See Discharge Summary         Progress towards goals / Updated goals:  Long term goals: to be accomplished in 4 weeks  1) Pt's FOTO score will improve > or = 59 indicating improvements in function. At PN: Progressing, 31, an increase of 9. 5/1/17  Current: progressing, score of 46, increase of 25 points (6/2/2017)  2) Pt will improve L shoulder PROM flex > or = 140 deg for ease with ADL's. At PN: Progressing, flex 115 degrees. (5/12/17)  Current: Progressing: flex 118 degrees. 5/15/17. 4) Pt will demo L shoulder AAROM flex/scap > or = 90 deg for ease with ADL's. At PN: Per script from MD on 5/9/17, begin AAROM on 5/23/17. (5/12/17)  Current: Met, flex 100 degrees, scap 90 degrees.  5/25/17    PLAN  []  Upgrade activities as tolerated     [x]  Continue plan of care  []  Update interventions per flow sheet       []  Discharge due to:_  []  Other:_      Kelsea Garcia 6/2/2017  7:47 AM    Future Appointments  Date Time Provider Pat Rosales   6/2/2017 9:00 AM [place] [facility]   6/5/2017 10:30 AM Mali E Laws, PTA [facility]   6/7/2017 9:30 AM Mali E Laws, PTA [facility]   6/9/2017 9:30 AM Mali E Laws, PTA [facility]   6/12/2017 9:30 AM Mali E Laws, PTA [facility]   6/14/2017 9:30 AM Mali E Laws, PTA [facility]   6/16/2017 11:00 AM [facility] PT SUFFOLK 1 [facility]   6/19/2017 10:00 AM Mali E Laws, PTA [facility]   6/21/2017 9:30 AM Mali E Laws, PTA [facility]   6/23/2017 9:30 AM Mali E Laws, PTA [facility]   6/27/2017 11:30 AM Roseann Be, PTA [facility]   6/27/2017 1:00 PM TATO Gee   6/28/2017 9:30 AM Mali Head, PTA [facility]   6/30/2017 9:30 AM Lexie Hou PTA [facility]   11/17/2017 9:15 AM IO NURSE VISIT [facility] SCHED   12/1/2017 2:00 PM Armida Duke MD [facility]

## 2017-06-05 ENCOUNTER — HOSPITAL ENCOUNTER (OUTPATIENT)
Dept: PHYSICAL THERAPY | Age: 72
Discharge: HOME | End: 2017-06-05
Payer: COMMERCIAL

## 2017-06-05 PROCEDURE — 97110 THERAPEUTIC EXERCISES: CPT

## 2017-06-05 PROCEDURE — G8984 CARRY CURRENT STATUS: HCPCS | Performed by: PHYSICAL THERAPIST

## 2017-06-05 PROCEDURE — 97140 MANUAL THERAPY 1/> REGIONS: CPT

## 2017-06-05 PROCEDURE — G8985 CARRY GOAL STATUS: HCPCS | Performed by: PHYSICAL THERAPIST

## 2017-06-05 NOTE — PROGRESS NOTES
PT DAILY TREATMENT NOTE     Patient Name: Addison Aj  Date:2017  : 1945  [x]  Patient  Verified  Payor: [place] MEDICARE / Plan: [place] MEDICARE PART A / Product Type: Medicare /    In time: 10:23  Out time:11:26  Total Treatment Time (min): 61  Visit #: 3 of 12    Treatment Area: Complete rotator cuff tear or rupture of left shoulder, not specified as traumatic [M75.122]    SUBJECTIVE  Pain Level (0-10 scale): 10  Any medication changes, allergies to medications, adverse drug reactions, diagnosis change, or new procedure performed?: [x] No    [] Yes (see summary sheet for update)  Subjective functional status/changes:   [] No changes reported  Pt states she has been hurting more lately, possibly due to the weather. OBJECTIVE    Modality rationale: decrease pain to improve the patients ability to increase ease of ADLs.     Min Type Additional Details    [] Estim:  []Unatt       []IFC  []Premod                        []Other:  []w/ice   []w/heat  Position:  Location:    [] Estim: []Att    []TENS instruct  []NMES                    []Other:  []w/US   []w/ice   []w/heat  Position:  Location:    []  Traction: [] Cervical       []Lumbar                       [] Prone          []Supine                       []Intermittent   []Continuous Lbs:  [] before manual  [] after manual    []  Ultrasound: []Continuous   [] Pulsed                           []1MHz   []3MHz W/cm2:  Location:    []  Iontophoresis with dexamethasone         Location: [] Take home patch   [] In clinic   10 [x]  Ice     []  heat  []  Ice massage  []  Laser   []  Anodyne Position: reclined  Location: L shoulder.     []  Laser with stim  []  Other:  Position:  Location:    []  Vasopneumatic Device Pressure:       [] lo [] med [] hi   Temperature: [] lo [] med [] hi   [] Skin assessment post-treatment:  []intact []redness- no adverse reaction    []redness  adverse reaction:     43 min Therapeutic Exercise:  [x] See flow sheet : Rationale: increase ROM and increase strength to improve the patients ability to perform ADLs. 10 min Manual Therapy:  Per flow sheet   Rationale: decrease pain, increase ROM and increase tissue extensibility to increase ease of ADLs. With   [] TE   [] TA   [] neuro   [] other: Patient Education: [x] Review HEP    [] Progressed/Changed HEP based on:   [] positioning   [] body mechanics   [] transfers   [] heat/ice application    [] other:      Other Objective/Functional Measures: See goals. Pain Level (0-10 scale) post treatment: 0/10    ASSESSMENT/Changes in Function: Pt reports pain ranging from 0/10 to 2/10 in PT. Pt states she has had increased pain at night, possibly due to the weather. FOTO score has increased by 25 since [facility]. PROM flexion 125 degrees, scap 108 degrees. AAROM on pulleys: flex 100 degrees, scap 90 degrees. Patient will continue to benefit from skilled PT services to modify and progress therapeutic interventions, address functional mobility deficits, address ROM deficits and address strength deficits to attain remaining goals. []  See Plan of Care  [x]  See progress note/recertification  []  See Discharge Summary         Progress towards goals / Updated goals:  Long term goals: to be accomplished in 4 weeks  1) Pt's FOTO score will improve > or = 59 indicating improvements in function. At PN: Progressing, 31, an increase of 9. 5/1/17  Current: progressing, score of 46, increase of 25 points (6/2/2017)  2) Pt will improve L shoulder PROM flex > or = 140 deg for ease with ADL's. At PN: Progressing, flex 115 degrees. (5/12/17)  Current: Progressing: flex 125 degrees. 6/5/17. 4) Pt will demo L shoulder AAROM flex/scap > or = 90 deg for ease with ADL's. At PN: Per script from MD on 5/9/17, begin AAROM on 5/23/17. (5/12/17)  Current: Met, flex 100 degrees, scap 90 degrees.  6/5/17    Updated goals:  1) Pt will reach 150 degrees of flex and scap AAROM to increase ease of ADLs. 2) Pt will reach 130 degrees of flex and scap AROM to increase ease of household chored.      PLAN  []  Upgrade activities as tolerated     [x]  Continue plan of care  []  Update interventions per flow sheet       []  Discharge due to:_  []  Other:_      Mali E Laws, PTA 6/5/2017  10:36 AM    Future Appointments  Date Time Provider Pat Rosales   6/7/2017 9:30 AM Mali E Laws, PTA [facility]   6/9/2017 9:30 AM Mali E Laws, PTA [facility]   6/12/2017 9:30 AM Mali E Laws, PTA [facility]   6/14/2017 9:30 AM Mali E Laws, PTA [facility]   6/16/2017 11:00 AM [facility] PT SUFFOLK 1 [facility]   6/19/2017 10:00 AM Mali E Laws, PTA [facility]   6/21/2017 9:30 AM Mali E Laws, PTA [facility]   6/23/2017 9:30 AM Mali E Laws, PTA [facility]   6/27/2017 11:30 AM Nicol Harris, PTA [facility]   6/27/2017 1:00 PM TATO Delatorre [facility] SCHED   6/28/2017 9:30 AM Mali E Laws, PTA [facility]   6/30/2017 9:30 AM Nimisha Villanueva, PTA [facility]   11/17/2017 9:15 AM IO NURSE VISIT [facility] JIN SCHED   12/1/2017 2:00 PM Yolanda Esparza MD [facility]

## 2017-06-05 NOTE — PROGRESS NOTES
[facility]              [address]        [place], [address] [place]   [phone] fax    Progress Note  Patient name: Bea Dewey Start of Care: 17   Referral source: Ayesha Valladares : 1945   Medical/Treatment Diagnosis: Complete rotator cuff tear or rupture of left shoulder, not specified as traumatic [M75.122] Onset Date:17     Prior Hospitalization: see medical history Provider#: [id number]   Medications: Verified on Patient Summary List    Comorbidities: Arthritis, Asthma, Back pain, BMI over 30, HTN  Prior Level of Function: Pt is retired but (I) with all ADL's and house chores. Visits from Start of Care: 20    Missed Visits: 1    Established Goals:        Excellent         Good         Limited            None  [] Increased ROM   []  []  []  []  [] Increased Strength  []  []  []  []  [] Increased Mobility  []  []  []  []   [] Decreased Pain   []  []  []  []  [] Decreased Swelling  []  []  []  []    Key Functional Changes:   Progress towards goals / Updated goals:  Long term goals: to be accomplished in 4 weeks  1) Pt's FOTO score will improve > or = 59 indicating improvements in function. At PN: Progressing, 31, an increase of 9  Current: progressing, score of 46, increase of 25 points  2) Pt will improve L shoulder PROM flex > or = 140 deg for ease with ADL's. At PN: Progressing, flex 115 degrees  Current: Progressing: flex 125 degrees  4) Pt will demo L shoulder AAROM flex/scap > or = 90 deg for ease with ADL's. At PN: Per script from MD on 17, begin AAROM on 17  Current: Met, flex 100 degrees, scap 90 degrees    Pt is progressing well w/ PT. Pt reports pain ranging from 0/10 to 2/10 in PT. Pt states she has had increased pain at night, possibly due to the weather. FOTO score has increased by 25 since [facility]. PROM flexion 125 degrees, scap 108 degrees. AAROM on pulleys: flex 100 degrees, scap 90 degrees.  Patient will continue to benefit from skilled PT services to modify and progress therapeutic interventions, address functional mobility deficits, address ROM deficits and address strength deficits to attain remaining goals. Updated Goals: to be achieved in 4 weeks:   Continue with unmet goals above  1) Pt will reach 150 degrees of flex and scap AAROM L shoulder to increase ease of ADLs. 2) Pt will reach 130 degrees of flex and scap AROM L shoulder to increase ease of household chores. 3) Pt will improve MMT L shoulder to > or = to 3+-4/5 for ease w/ reaching into cabinets. G-Codes (GP)  Carry   Current CK= 40-59%  [id number] Goal CK= 40-59%    ASSESSMENT/RECOMMENDATIONS:  [x]Continue therapy per initial plan/protocol at a frequency of  3 x per week for 4 weeks  []Continue therapy with the following recommended changes:_____________________      _____________________________________________________________________  []Discontinue therapy progressing towards or have reached established goals  []Discontinue therapy due to lack of appreciable progress towards goals  []Discontinue therapy due to lack of attendance or compliance  []Await Physician's recommendations/decisions regarding therapy  []Other:________________________________________________________________    Thank you for this referral.    Roque Goldstein, PT 6/5/2017 3:15 PM  NOTE TO PHYSICIAN:  PLEASE COMPLETE THE ORDERS BELOW AND   FAX TO [facility]: [phone]  If you are unable to process this request in 24 hours please contact our office: [phone]    []  I have read the above report and request that my patient continue as recommended. []  I have read the above report and request that my patient continue therapy with the following changes/special instructions:________________________________________  []I have read the above report and request that my patient be discharged from therapy.     Physicians signature: ________________________________Date: _____Time:_____

## 2017-06-07 ENCOUNTER — HOSPITAL ENCOUNTER (OUTPATIENT)
Dept: PHYSICAL THERAPY | Age: 72
Discharge: HOME | End: 2017-06-07
Payer: COMMERCIAL

## 2017-06-07 PROCEDURE — 97110 THERAPEUTIC EXERCISES: CPT

## 2017-06-07 PROCEDURE — 97140 MANUAL THERAPY 1/> REGIONS: CPT

## 2017-06-07 NOTE — PROGRESS NOTES
PT DAILY TREATMENT NOTE     Patient Name: Daniela Briseno  Date:2017  : 1945  [x]  Patient  Verified  Payor: [place] MEDICARE / Plan: [place] MEDICARE PART A / Product Type: Medicare /    In time: 9:28  Out time:10:15  Total Treatment Time (min): 52  Visit #: 4 of 12    Treatment Area: Complete rotator cuff tear or rupture of left shoulder, not specified as traumatic [M75.122]    SUBJECTIVE  Pain Level (0-10 scale): 1-2/10  Any medication changes, allergies to medications, adverse drug reactions, diagnosis change, or new procedure performed?: [x] No    [] Yes (see summary sheet for update)  Subjective functional status/changes:   [] No changes reported  Pt states she reached to clean up a spill and got a shot of increased pain, it has calmed down since then. OBJECTIVE    Modality rationale: decrease pain to improve the patients ability to increase ease of ADLs.     Min Type Additional Details    [] Estim:  []Unatt       []IFC  []Premod                        []Other:  []w/ice   []w/heat  Position:  Location:    [] Estim: []Att    []TENS instruct  []NMES                    []Other:  []w/US   []w/ice   []w/heat  Position:  Location:    []  Traction: [] Cervical       []Lumbar                       [] Prone          []Supine                       []Intermittent   []Continuous Lbs:  [] before manual  [] after manual    []  Ultrasound: []Continuous   [] Pulsed                           []1MHz   []3MHz W/cm2:  Location:    []  Iontophoresis with dexamethasone         Location: [] Take home patch   [] In clinic   10 [x]  Ice     []  heat  []  Ice massage  []  Laser   []  Anodyne Position:  reclined  Location: L shoulder    []  Laser with stim  []  Other:  Position:  Location:    []  Vasopneumatic Device Pressure:       [] lo [] med [] hi   Temperature: [] lo [] med [] hi   [] Skin assessment post-treatment:  []intact []redness- no adverse reaction    []redness  adverse reaction:     27 min Therapeutic Exercise:  [x] See flow sheet :   Rationale: increase ROM and increase strength to improve the patients ability to perform ADLs. 10 min Manual Therapy:  Per flow sheet   Rationale: decrease pain, increase ROM and increase tissue extensibility to increase ease of ADLs. With   [] TE   [] TA   [] neuro   [] other: Patient Education: [x] Review HEP    [] Progressed/Changed HEP based on:   [] positioning   [] body mechanics   [] transfers   [] heat/ice application    [] other:      Other Objective/Functional Measures: Advanced table slides to lat table stretch. Pain Level (0-10 scale) post treatment: 0/10    ASSESSMENT/Changes in Function: Cont per POC. Patient will continue to benefit from skilled PT services to modify and progress therapeutic interventions, address functional mobility deficits, address ROM deficits and address strength deficits to attain remaining goals. []  See Plan of Care  []  See progress note/recertification  []  See Discharge Summary         Progress towards goals / Updated goals:  Long term goals: to be accomplished in 4 weeks  1) Pt's FOTO score will improve > or = 59 indicating improvements in function. At PN: Progressing, 31, an increase of 9  Current: progressing, score of 46, increase of 25 points  2) Pt will improve L shoulder PROM flex > or = 140 deg for ease with ADL's. At PN: Progressing, flex 115 degrees  Current: Progressing: flex 125 degrees  3) Pt will reach 150 degrees of flex and scap AAROM L shoulder to increase ease of ADLs. At PN: flex 100 degrees, scap 90 degrees  4) Pt will reach 130 degrees of flex and scap AROM L shoulder to increase ease of household chores. At PN: Not tested. 5) Pt will improve MMT L shoulder to > or = to 3+-4/5 for ease w/ reaching into cabinets. At PN: Not tested.      PLAN  []  Upgrade activities as tolerated     [x]  Continue plan of care  []  Update interventions per flow sheet       []  Discharge due to:_  [] Other:_      Cesar Alan, PTA 6/7/2017  10:22 AM    Future Appointments  Date Time Provider Pat Rosales   6/9/2017 9:30 AM Mali E Laws, PTA [facility]   6/12/2017 9:30 AM Mali E Laws, PTA [facility]   6/14/2017 9:30 AM Mali E Laws, PTA [facility]   6/16/2017 11:00 AM [facility]   6/19/2017 10:00 AM Mali E Laws, PTA [facility]   6/21/2017 9:30 AM Mali E Laws, PTA [facility]   6/23/2017 9:30 AM Mali E Laws, PTA [facility]   6/27/2017 11:30 AM Kory Perez, PTA [facility]   6/27/2017 1:00 PM TATO Patten   6/28/2017 9:30 AM Mali E Laws, PTA [facility]   6/30/2017 9:30 AM Cesar Alan, PTA [facility]   11/17/2017 9:15 AM IO NURSE VISIT [facility] JIN SCHED   12/1/2017 2:00 PM Elizabeth Robin MD [facility]

## 2017-06-09 ENCOUNTER — HOSPITAL ENCOUNTER (OUTPATIENT)
Dept: PHYSICAL THERAPY | Age: 72
Discharge: HOME | End: 2017-06-09
Payer: COMMERCIAL

## 2017-06-09 PROCEDURE — 97140 MANUAL THERAPY 1/> REGIONS: CPT

## 2017-06-09 PROCEDURE — 97110 THERAPEUTIC EXERCISES: CPT

## 2017-06-09 NOTE — PROGRESS NOTES
PT DAILY TREATMENT NOTE     Patient Name: Daljit Walters  Date:2017  : 1945  [x]  Patient  Verified  Payor: Selma Bower / Plan: [place] MEDICARE PART A / Product Type: Medicare /    In time: 9:26  Out time: 10:21  Total Treatment Time (min): 55  Visit #: 5 of 12    Treatment Area: Complete rotator cuff tear or rupture of left shoulder, not specified as traumatic [M75.122]    SUBJECTIVE  Pain Level (0-10 scale): 2/10  Any medication changes, allergies to medications, adverse drug reactions, diagnosis change, or new procedure performed?: [x] No    [] Yes (see summary sheet for update)  Subjective functional status/changes:   [] No changes reported  Pt reports she got ambitious and tried to wipe off her stove top. OBJECTIVE    Modality rationale: decrease pain to improve the patients ability to increase ease of ADLs.     Min Type Additional Details    [] Estim:  []Unatt       []IFC  []Premod                        []Other:  []w/ice   []w/heat  Position:  Location:    [] Estim: []Att    []TENS instruct  []NMES                    []Other:  []w/US   []w/ice   []w/heat  Position:  Location:    []  Traction: [] Cervical       []Lumbar                       [] Prone          []Supine                       []Intermittent   []Continuous Lbs:  [] before manual  [] after manual    []  Ultrasound: []Continuous   [] Pulsed                           []1MHz   []3MHz W/cm2:  Location:    []  Iontophoresis with dexamethasone         Location: [] Take home patch   [] In clinic   10 [x]  Ice     []  heat  []  Ice massage  []  Laser   []  Anodyne Position: reclined  Location: L shoulder    []  Laser with stim  []  Other:  Position:  Location:    []  Vasopneumatic Device Pressure:       [] lo [] med [] hi   Temperature: [] lo [] med [] hi   [] Skin assessment post-treatment:  []intact []redness- no adverse reaction    []redness  adverse reaction:     35 min Therapeutic Exercise:  [x] See flow sheet :   Rationale: increase ROM and increase strength to improve the patients ability to perform ADLs. 10 min Manual Therapy:  Per flow sheet   Rationale: decrease pain, increase ROM and increase tissue extensibility to increase ease of ADLs. With   [] TE   [] TA   [] neuro   [] other: Patient Education: [x] Review HEP    [] Progressed/Changed HEP based on:   [] positioning   [] body mechanics   [] transfers   [] heat/ice application    [] other:      Other Objective/Functional Measures: Supine AROM 126 degrees. Pain Level (0-10 scale) post treatment: 0/10    ASSESSMENT/Changes in Function: Cont per POC. Patient will continue to benefit from skilled PT services to modify and progress therapeutic interventions, address functional mobility deficits, address ROM deficits and address strength deficits to attain remaining goals. []  See Plan of Care  []  See progress note/recertification  []  See Discharge Summary         Progress towards goals / Updated goals:  Long term goals: to be accomplished in 4 weeks  1) Pt's FOTO score will improve > or = 59 indicating improvements in function. At PN: Progressing, 31, an increase of 9  Current: progressing, score of 46, increase of 25 points  2) Pt will improve L shoulder PROM flex > or = 140 deg for ease with ADL's. At PN: Progressing, flex 115 degrees  Current: Progressing: flex 125 degrees  3) Pt will reach 150 degrees of flex and scap AAROM L shoulder to increase ease of ADLs. At PN: flex 100 degrees, scap 90 degrees  4) Pt will reach 130 degrees of flex and scap AROM L shoulder to increase ease of household chores. At PN: Not tested. Current: Progressing. Supine flex 126 degrees. 6/9/17  5) Pt will improve MMT L shoulder to > or = to 3+-4/5 for ease w/ reaching into cabinets. At PN: Not tested.      PLAN  []  Upgrade activities as tolerated     [x]  Continue plan of care  []  Update interventions per flow sheet       []  Discharge due to:_  []  Other:_ Ishan Braun, PTA 6/9/2017  9:38 AM    Future Appointments  Date Time Provider Pat Rosales   6/12/2017 9:30 AM Mali E Laws, PTA [facility]   6/14/2017 9:30 AM Mali E Laws, PTA [facility]   6/16/2017 11:00 AM [facility] PT SUFFOLK 1 [facility]   6/19/2017 10:00 AM Mali E Laws, PTA [facility]   6/21/2017 9:30 AM Mali E Laws, PTA [facility]   6/23/2017 9:30 AM Mali E Laws, PTA [facility]   6/27/2017 11:30 AM Ashley Walker, PTA [facility]   6/27/2017 1:00 PM TATO Goel [facility] JIN SCHED   6/28/2017 9:30 AM Mali E Laws, PTA [facility]   6/30/2017 9:30 AM Ishan Braun, PTA [facility]   11/17/2017 9:15 AM IOC NURSE VISIT IO JIN SCHED   12/1/2017 2:00 PM Manuel Rg MD [facility]

## 2017-06-12 ENCOUNTER — HOSPITAL ENCOUNTER (OUTPATIENT)
Dept: PHYSICAL THERAPY | Age: 72
Discharge: HOME | End: 2017-06-12
Payer: COMMERCIAL

## 2017-06-12 PROCEDURE — 97110 THERAPEUTIC EXERCISES: CPT

## 2017-06-12 PROCEDURE — 97140 MANUAL THERAPY 1/> REGIONS: CPT

## 2017-06-12 NOTE — PROGRESS NOTES
PT DAILY TREATMENT NOTE     Patient Name: Mariah Ordoñez  Date:2017  : 1945  [x]  Patient  Verified  Payor: Mandi Napier / Plan: [place] MED[place] / Product Type: Medicare /    In time: 9:19  Out time: 10:22  Total Treatment Time (min): 63  Visit #: 6 of 12    Treatment Area: Complete rotator cuff tear or rupture of left shoulder, not specified as traumatic [M75.122]    SUBJECTIVE  Pain Level (0-10 scale): 0/10  Any medication changes, allergies to medications, adverse drug reactions, diagnosis change, or new procedure performed?: [x] No    [] Yes (see summary sheet for update)  Subjective functional status/changes:   [] No changes reported  Pt reports she slept in her bed last night, even slept on her shoulder for a short time. OBJECTIVE    Modality rationale: decrease pain to improve the patients ability to increase ease of ADLs.     Min Type Additional Details    [] Estim:  []Unatt       []IFC  []Premod                        []Other:  []w/ice   []w/heat  Position:  Location:    [] Estim: []Att    []TENS instruct  []NMES                    []Other:  []w/US   []w/ice   []w/heat  Position:  Location:    []  Traction: [] Cervical       []Lumbar                       [] Prone          []Supine                       []Intermittent   []Continuous Lbs:  [] before manual  [] after manual    []  Ultrasound: []Continuous   [] Pulsed                           []1MHz   []3MHz W/cm2:  Location:    []  Iontophoresis with dexamethasone         Location: [] Take home patch   [] In clinic   10 [x]  Ice     []  heat  []  Ice massage  []  Laser   []  Anodyne Position: Seated  Location: L shoulder    []  Laser with stim  []  Other:  Position:  Location:    []  Vasopneumatic Device Pressure:       [] lo [] med [] hi   Temperature: [] lo [] med [] hi   [] Skin assessment post-treatment:  []intact []redness- no adverse reaction    []redness  adverse reaction:     43 min Therapeutic Exercise:  [x] See flow sheet :   Rationale: increase ROM and increase strength to improve the patients ability to increase ease of ADLs. 10 min Manual Therapy:  Per flow sheet   Rationale: decrease pain, increase ROM and increase tissue extensibility to increase ease of ADLs. With   [] TE   [] TA   [] neuro   [] other: Patient Education: [x] Review HEP    [] Progressed/Changed HEP based on:   [] positioning   [] body mechanics   [] transfers   [] heat/ice application    [] other:      Other Objective/Functional Measures: AAROM: flex 111 degrees, scap 95 degrees. Pain Level (0-10 scale) post treatment: 0/10    ASSESSMENT/Changes in Function: Cont per POC. Patient will continue to benefit from skilled PT services to modify and progress therapeutic interventions, address functional mobility deficits, address ROM deficits and address strength deficits to attain remaining goals. []  See Plan of Care  []  See progress note/recertification  []  See Discharge Summary         Progress towards goals / Updated goals:  Long term goals: to be accomplished in 4 weeks  1) Pt's FOTO score will improve > or = 59 indicating improvements in function. At PN: Progressing, 31, an increase of 9  Current: progressing, score of 46, increase of 25 points  2) Pt will improve L shoulder PROM flex > or = 140 deg for ease with ADL's. At PN: Progressing, flex 115 degrees  Current: Progressing: flex 125 degrees  3) Pt will reach 150 degrees of flex and scap AAROM L shoulder to increase ease of ADLs. At PN: flex 100 degrees, scap 90 degrees  Current: Progressing, flex 111 degrees, scap 95 degrees. 6/12/17  4) Pt will reach 130 degrees of flex and scap AROM L shoulder to increase ease of household chores. At PN: Not tested. Current: Progressing. Supine flex 126 degrees. 6/9/17  5) Pt will improve MMT L shoulder to > or = to 3+-4/5 for ease w/ reaching into cabinets. At PN: Not tested.      PLAN  []  Upgrade activities as tolerated     [x] Continue plan of care  []  Update interventions per flow sheet       []  Discharge due to:_  []  Other:_      Mali E Laws, PTA 6/12/2017  9:38 AM    Future Appointments  Date Time Provider Pat Connie   6/14/2017 9:30 AM Mali E Laws, PTA [facility]   6/16/2017 11:00 AM [facility] PT SUFFOLK 1 [facility]   6/19/2017 10:00 AM Mali E Laws, PTA [facility]   6/21/2017 9:30 AM Mali E Laws, PTA [facility]   6/23/2017 9:30 AM Mali E Laws, PTA [facility]   6/27/2017 11:30 AM Columba Haro, PTA [facility]   6/27/2017 1:00 PM TATO ChatmanMD [facility] SCHED   6/28/2017 9:30 AM Mali E Laws, PTA [facility]   6/30/2017 9:30 AM Corby Franco PTA [facility]   11/17/2017 9:15 AM [facility] NURSE VISIT [facility] SCHED   12/1/2017 2:00 PM Florencia Peralta MD [facility]

## 2017-06-14 ENCOUNTER — HOSPITAL ENCOUNTER (OUTPATIENT)
Dept: PHYSICAL THERAPY | Age: 72
Discharge: HOME | End: 2017-06-14
Payer: COMMERCIAL

## 2017-06-14 PROCEDURE — 97140 MANUAL THERAPY 1/> REGIONS: CPT

## 2017-06-14 PROCEDURE — 97110 THERAPEUTIC EXERCISES: CPT

## 2017-06-14 NOTE — PROGRESS NOTES
PT DAILY TREATMENT NOTE     Patient Name: Jon Flynn  Date:2017  : 1945  [x]  Patient  Verified  Payor: Brennan Acevedo / Plan: [place] MEDICARE PART A / Product Type: Medicare /    In time: 9:19  Out time:10:19  Total Treatment Time (min): 60  Visit #: 7 of 12    Treatment Area: Complete rotator cuff tear or rupture of left shoulder, not specified as traumatic [M75.122]    SUBJECTIVE  Pain Level (0-10 scale): 1/10  Any medication changes, allergies to medications, adverse drug reactions, diagnosis change, or new procedure performed?: [x] No    [] Yes (see summary sheet for update)  Subjective functional status/changes:   [] No changes reported  Pt reports no changes since last visit. OBJECTIVE    Modality rationale: decrease pain to improve the patients ability to increase ease of ADLs.     Min Type Additional Details    [] Estim:  []Unatt       []IFC  []Premod                        []Other:  []w/ice   []w/heat  Position:  Location:    [] Estim: []Att    []TENS instruct  []NMES                    []Other:  []w/US   []w/ice   []w/heat  Position:  Location:    []  Traction: [] Cervical       []Lumbar                       [] Prone          []Supine                       []Intermittent   []Continuous Lbs:  [] before manual  [] after manual    []  Ultrasound: []Continuous   [] Pulsed                           []1MHz   []3MHz W/cm2:  Location:    []  Iontophoresis with dexamethasone         Location: [] Take home patch   [] In clinic   10 [x]  Ice     []  heat  []  Ice massage  []  Laser   []  Anodyne Position: reclined  Location: L shoulder    []  Laser with stim  []  Other:  Position:  Location:    []  Vasopneumatic Device Pressure:       [] lo [] med [] hi   Temperature: [] lo [] med [] hi   [] Skin assessment post-treatment:  []intact []redness- no adverse reaction    []redness  adverse reaction:     40 min Therapeutic Exercise:  [x] See flow sheet :   Rationale: increase ROM and increase strength to improve the patients ability to increase ease of ADLs. 10 min Manual Therapy:  Per flow sheet   Rationale: decrease pain, increase ROM and increase tissue extensibility to increase ease of ADLs. With   [] TE   [] TA   [] neuro   [] other: Patient Education: [x] Review HEP    [] Progressed/Changed HEP based on:   [] positioning   [] body mechanics   [] transfers   [] heat/ice application    [] other:      Other Objective/Functional Measures: Flexion PROM 125 degrees. Pain Level (0-10 scale) post treatment: 1/10    ASSESSMENT/Changes in Function: Cont per POC. Patient will continue to benefit from skilled PT services to modify and progress therapeutic interventions, address functional mobility deficits, address ROM deficits and address strength deficits to attain remaining goals. []  See Plan of Care  []  See progress note/recertification  []  See Discharge Summary         Progress towards goals / Updated goals:  Long term goals: to be accomplished in 4 weeks  1) Pt's FOTO score will improve > or = 59 indicating improvements in function. At PN: Progressing, 31, an increase of 9  Current: progressing, score of 46, increase of 25 points  2) Pt will improve L shoulder PROM flex > or = 140 deg for ease with ADL's. At PN: Progressing, flex 115 degrees  Current: Progressing: flex 125 degrees  6/14/17  3) Pt will reach 150 degrees of flex and scap AAROM L shoulder to increase ease of ADLs. At PN: flex 100 degrees, scap 90 degrees  Current: Progressing, flex 111 degrees, scap 95 degrees. 6/12/17  4) Pt will reach 130 degrees of flex and scap AROM L shoulder to increase ease of household chores. At PN: Not tested. Current: Progressing. Supine flex 126 degrees. 6/9/17  5) Pt will improve MMT L shoulder to > or = to 3+-4/5 for ease w/ reaching into cabinets. At PN: Not tested.      PLAN  []  Upgrade activities as tolerated     [x]  Continue plan of care  []  Update interventions per flow sheet       []  Discharge due to:_  []  Other:_      Mali E Laws, PTA 6/14/2017  10:14 AM    Future Appointments  Date Time Provider Pat Connie   6/16/2017 11:00 AM [facility] PT SUFFOLK 1 [facility]   6/19/2017 10:00 AM Mali E Laws, PTA [facility]   6/21/2017 9:30 AM Mali E Laws, PTA [facility]   6/23/2017 9:30 AM Mali E Laws, PTA [facility]   6/27/2017 11:30 AM Diallo Hollis, PTA [facility]   6/27/2017 1:00 PM TATO GodfreyMD [facility] SCHED   6/28/2017 9:30 AM Mali Head, PTA [facility]   6/30/2017 9:30 AM Ramana Chaney, PTA [facility]   11/17/2017 9:15 AM [facility] NURSE VISIT [facility] JIN SCHED   12/1/2017 2:00 PM Rafaela Donald MD [facility]

## 2017-06-16 ENCOUNTER — HOSPITAL ENCOUNTER (OUTPATIENT)
Dept: PHYSICAL THERAPY | Age: 72
Discharge: HOME | End: 2017-06-16
Payer: COMMERCIAL

## 2017-06-16 PROCEDURE — 97110 THERAPEUTIC EXERCISES: CPT

## 2017-06-16 PROCEDURE — 97140 MANUAL THERAPY 1/> REGIONS: CPT

## 2017-06-16 NOTE — PROGRESS NOTES
PT DAILY TREATMENT NOTE - [facility]     Patient Name: Olga Pritchard  Date:2017  : 1945  [x]  Patient  Verified  Payor: Varun Bars / Plan: [place] MEDICARE PART A / Product Type: Medicare /    In time:1050  Out time:1135  Total Treatment Time (min): 45  Total Timed Codes (min): 35  1:1 Treatment Time ( only): 35   Visit #: 8 of 12    Treatment Area: Complete rotator cuff tear or rupture of left shoulder, not specified as traumatic [M75.122]    SUBJECTIVE  Pain Level (0-10 scale): 3-4/10  Any medication changes, allergies to medications, adverse drug reactions, diagnosis change, or new procedure performed?: [x] No    [] Yes (see summary sheet for update)  Subjective functional status/changes:   [] No changes reported  Increase in pain reported with patient questioning the contribution of the weather. OBJECTIVE    Modality rationale: decrease edema and decrease pain to improve the patients ability to utilize L UE with performance of self-care ADLs. Min Type Additional Details   10 [x]  Ice     []  heat   Position: Reclined  Location: L Shoulder     27 min Therapeutic Exercise:  [x] See flow sheet : Emphasis placed on periscapular and glenohumeral strengthening   Rationale: increase ROM and increase strength to improve the patients ability to perform needlework without limitations. 8 min Manual Therapy:    Supine L Glenohumeral Inferior Grade I/II Mobilizations, x3'  Supine L Glenohumeral AP Grade I/II Mobilizations, x2'  R Sidelying L Scapular PNF (inferior/posterior), x3'   Rationale: increase ROM and increase tissue extensibility to utilize L UE to perform household chores.     With   [] TE   [] TA   [] neuro   [] other: Patient Education: [x] Review HEP    [] Progressed/Changed HEP based on:   [] positioning   [] body mechanics   [] transfers   [] heat/ice application    [] other:       Pain Level (0-10 scale) post treatment: 1/10    ASSESSMENT/Changes in Function: Patient noted with empty end-feels and pain with assessment of L shoulder PROM in all directions. Grade I/II mobilizations performed for pain relief. Progressed periscapular and glenohumeral strengthening with good tolerance demonstrated. Patient noted with diminished AROM in the directions of flexion and scaption with initiation of performance of wall slides. Patient will continue to benefit from skilled PT services to modify and progress therapeutic interventions, address functional mobility deficits, address ROM deficits, address strength deficits, analyze and address soft tissue restrictions and analyze and cue movement patterns to attain remaining goals. []  See Plan of Care  []  See progress note/recertification  []  See Discharge Summary         Progress towards goals / Updated goals:    Long term goals: to be accomplished in 4 weeks  1) Pt's FOTO score will improve > or = 59 indicating improvements in function. At PN: Progressing, 31, an increase of 9  Current: Progressing, FOTO = 47, 6/16/2017  2) Pt will improve L shoulder PROM flex > or = 140 deg for ease with ADL's. At PN: Progressing, flex 115 degrees  Current: Progressing: flex 125 degrees 6/14/17  3) Pt will reach 150 degrees of flex and scap AAROM L shoulder to increase ease of ADLs. At PN: flex 100 degrees, scap 90 degrees  Current: Progressing, flex 111 degrees, scap 95 degrees. 6/12/17  4) Pt will reach 130 degrees of flex and scap AROM L shoulder to increase ease of household chores. At PN: Not tested. Current: Progressing. Supine flex 126 degrees. 6/9/17  5) Pt will improve MMT L shoulder to > or = to 3+-4/5 for ease w/ reaching into cabinets. At PN: Not tested.      PLAN  [x]  Upgrade activities as tolerated     [x]  Continue plan of care  []  Update interventions per flow sheet       []  Discharge due to:_  []  Other:_      Chace Denis, PT 6/16/2017  10:48 AM    Future Appointments  Date Time Provider Pat Rosales   6/16/2017 11:00 AM Rosie Rishi Arteaga, PT [facility]   6/19/2017 10:00 AM Malijob Head, PTA [facility]   6/21/2017 9:30 AM Mali E Laws, PTA [facility]   6/23/2017 9:30 AM Mali E Laws, PTA [facility]   6/27/2017 11:30 AM Roseann Lr, PTA [facility]   6/27/2017 1:00 PM ATTO Gee [facility] JIN SCHED   6/28/2017 9:30 AM Malijob Head, PTA [facility]   6/30/2017 9:30 AM Lexie Hou, PTA [facility]   11/17/2017 9:15 AM IO NURSE VISIT [facility] JIN SCHED   12/1/2017 2:00 PM Armida Duke MD [facility]

## 2017-06-19 ENCOUNTER — HOSPITAL ENCOUNTER (OUTPATIENT)
Dept: PHYSICAL THERAPY | Age: 72
Discharge: HOME | End: 2017-06-19
Payer: COMMERCIAL

## 2017-06-19 PROCEDURE — 97110 THERAPEUTIC EXERCISES: CPT

## 2017-06-19 PROCEDURE — 97140 MANUAL THERAPY 1/> REGIONS: CPT

## 2017-06-19 NOTE — PROGRESS NOTES
PT DAILY TREATMENT NOTE     Patient Name: Say Ford  Date:2017  : 1945  [x]  Patient  Verified  Payor: [place] MEDICARE / Plan: [place] MEDICARE PART A / Product Type: Medicare /    In time:10:00  Out time:10:47  Total Treatment Time (min): 52  Visit #: 9 of 12    Treatment Area: Complete rotator cuff tear or rupture of left shoulder, not specified as traumatic [M75.122]    SUBJECTIVE  Pain Level (0-10 scale): 1/10  Any medication changes, allergies to medications, adverse drug reactions, diagnosis change, or new procedure performed?: [x] No    [] Yes (see summary sheet for update)  Subjective functional status/changes:   [] No changes reported  Pt states she is able to reach to her high cabinet and open it. OBJECTIVE    Modality rationale: decrease pain to improve the patients ability to perform ADLs.     Min Type Additional Details    [] Estim:  []Unatt       []IFC  []Premod                        []Other:  []w/ice   []w/heat  Position:  Location:    [] Estim: []Att    []TENS instruct  []NMES                    []Other:  []w/US   []w/ice   []w/heat  Position:  Location:    []  Traction: [] Cervical       []Lumbar                       [] Prone          []Supine                       []Intermittent   []Continuous Lbs:  [] before manual  [] after manual    []  Ultrasound: []Continuous   [] Pulsed                           []1MHz   []3MHz W/cm2:  Location:    []  Iontophoresis with dexamethasone         Location: [] Take home patch   [] In clinic   10 []  Ice     [x]  heat  []  Ice massage  []  Laser   []  Anodyne Position: reclined   Location: L shoulder    []  Laser with stim  []  Other:  Position:  Location:    []  Vasopneumatic Device Pressure:       [] lo [] med [] hi   Temperature: [] lo [] med [] hi   [] Skin assessment post-treatment:  []intact []redness- no adverse reaction    []redness  adverse reaction:     27 min Therapeutic Exercise:  [x] See flow sheet :   Rationale: increase ROM and increase strength to improve the patients ability to perform ADLs. 10 min Manual Therapy:  Per flow sheet   Rationale: decrease pain, increase ROM and increase tissue extensibility to increase ease of ADLs. With   [] TE   [] TA   [] neuro   [] other: Patient Education: [x] Review HEP    [] Progressed/Changed HEP based on:   [] positioning   [] body mechanics   [] transfers   [] heat/ice application    [] other:      Other Objective/Functional Measures: AG AROM: flex 93 degrees, scap 60 degrees. Pain Level (0-10 scale) post treatment: 0/10    ASSESSMENT/Changes in Function: Cont per POC. Patient will continue to benefit from skilled PT services to modify and progress therapeutic interventions, address functional mobility deficits, address ROM deficits and address strength deficits to attain remaining goals. []  See Plan of Care  []  See progress note/recertification  []  See Discharge Summary         Progress towards goals / Updated goals:  Long term goals: to be accomplished in 4 weeks  1) Pt's FOTO score will improve > or = 59 indicating improvements in function. At PN: Progressing, 31, an increase of 9  Current: Progressing, FOTO = 47, 6/16/2017  2) Pt will improve L shoulder PROM flex > or = 140 deg for ease with ADL's. At PN: Progressing, flex 115 degrees  Current: Progressing: flex 125 degrees 6/14/17  3) Pt will reach 150 degrees of flex and scap AAROM L shoulder to increase ease of ADLs. At PN: flex 100 degrees, scap 90 degrees  Current: Progressing, flex 111 degrees, scap 95 degrees. 6/12/17  4) Pt will reach 130 degrees of flex and scap AROM L shoulder to increase ease of household chores. At PN: Not tested. Current: Progressing,Seated AROM flex 93 degrees, scap 60 degrees. 6/19/17  5) Pt will improve MMT L shoulder to > or = to 3+-4/5 for ease w/ reaching into cabinets. At PN: Not tested.      PLAN  []  Upgrade activities as tolerated     [x]  Continue plan of care  []  Update interventions per flow sheet       []  Discharge due to:_  []  Other:_      Mali Head, PTA 6/19/2017  12:38 PM    Future Appointments  Date Time Provider Pat Manueli   6/21/2017 9:30 AM Mali Head, PTA [facility]   6/23/2017 9:30 AM Mali Head, PTA [facility]   6/27/2017 11:30 AM Missael Rojas PTA [facility]   6/27/2017 1:00 PM TATO White JIN SCHED   6/28/2017 9:30 AM Mali Head, PTA [facility]   6/30/2017 9:30 AM Deep Kasper PTA [facility]   11/17/2017 9:15 AM IO NURSE VISIT [facility] JIN SCHED   12/1/2017 2:00 PM Aimee Mcdonald MD [facility]

## 2017-06-21 ENCOUNTER — HOSPITAL ENCOUNTER (OUTPATIENT)
Dept: PHYSICAL THERAPY | Age: 72
Discharge: HOME | End: 2017-06-21
Payer: COMMERCIAL

## 2017-06-21 PROCEDURE — 97140 MANUAL THERAPY 1/> REGIONS: CPT

## 2017-06-21 PROCEDURE — 97110 THERAPEUTIC EXERCISES: CPT

## 2017-06-21 NOTE — PROGRESS NOTES
PT DAILY TREATMENT NOTE     Patient Name: Gallito Valdes  Date:2017  : 1945  [x]  Patient  Verified  Payor: Kamron Hernandez / Plan: [place] MEDICARE PART A / Product Type: Medicare /    In time: 9:25  Out time: 10:17  Total Treatment Time (min): 46  Visit #: 6 of 12 (PN signed on 17)    Treatment Area: Complete rotator cuff tear or rupture of left shoulder, not specified as traumatic [M75.122]    SUBJECTIVE  Pain Level (0-10 scale): 2/10  Any medication changes, allergies to medications, adverse drug reactions, diagnosis change, or new procedure performed?: [x] No    [] Yes (see summary sheet for update)  Subjective functional status/changes:   [] No changes reported  Pt states she able to more with her L arm now. OBJECTIVE    Modality rationale: decrease pain to improve the patients ability to increase eaes of ADLs.     Min Type Additional Details    [] Estim:  []Unatt       []IFC  []Premod                        []Other:  []w/ice   []w/heat  Position:  Location:    [] Estim: []Att    []TENS instruct  []NMES                    []Other:  []w/US   []w/ice   []w/heat  Position:  Location:    []  Traction: [] Cervical       []Lumbar                       [] Prone          []Supine                       []Intermittent   []Continuous Lbs:  [] before manual  [] after manual    []  Ultrasound: []Continuous   [] Pulsed                           []1MHz   []3MHz W/cm2:  Location:    []  Iontophoresis with dexamethasone         Location: [] Take home patch   [] In clinic    []  Ice     []  heat  []  Ice massage  []  Laser   []  Anodyne Position:  Location:    []  Laser with stim  []  Other:  Position:  Location:    []  Vasopneumatic Device Pressure:       [] lo [] med [] hi   Temperature: [] lo [] med [] hi   [] Skin assessment post-treatment:  []intact []redness- no adverse reaction    []redness  adverse reaction:     42 min Therapeutic Exercise:  [x] See flow sheet :   Rationale: increase ROM and increase strength to improve the patients ability to increase ease of ADLs. 10 min Manual Therapy:  Per flow sheet   Rationale: decrease pain, increase ROM and increase tissue extensibility to increase ease of ADLs. With   [] TE   [] TA   [] neuro   [] other: Patient Education: [x] Review HEP    [] Progressed/Changed HEP based on:   [] positioning   [] body mechanics   [] transfers   [] heat/ice application    [] other:      Other Objective/Functional Measures: No increased pain with TherEx. PT reported decreased crepitus during SL AROM abd. Pain Level (0-10 scale) post treatment: 0/10    ASSESSMENT/Changes in Function: Cont per POC. Patient will continue to benefit from skilled PT services to modify and progress therapeutic interventions, address functional mobility deficits, address ROM deficits and address strength deficits to attain remaining goals. []  See Plan of Care  []  See progress note/recertification  []  See Discharge Summary         Progress towards goals / Updated goals:  Long term goals: to be accomplished in 4 weeks  1) Pt's FOTO score will improve > or = 59 indicating improvements in function. At PN: Progressing, 31, an increase of 9  Current: Progressing, FOTO = 47, 6/16/2017  2) Pt will improve L shoulder PROM flex > or = 140 deg for ease with ADL's. At PN: Progressing, flex 115 degrees  Current: Progressing: flex 125 degrees 6/14/17  3) Pt will reach 150 degrees of flex and scap AAROM L shoulder to increase ease of ADLs. At PN: flex 100 degrees, scap 90 degrees  Current: Progressing, flex 111 degrees, scap 95 degrees. 6/12/17  4) Pt will reach 130 degrees of flex and scap AROM L shoulder to increase ease of household chores. At PN: Not tested. Current: Progressing,Seated AROM flex 93 degrees, scap 60 degrees. 6/19/17  5) Pt will improve MMT L shoulder to > or = to 3+-4/5 for ease w/ reaching into cabinets. At PN: Not tested.     PLAN  []  Upgrade activities as tolerated     [x]  Continue plan of care  []  Update interventions per flow sheet       []  Discharge due to:_  []  Other:_      Mali Head PTA 6/21/2017  9:51 AM    Future Appointments  Date Time Provider Pat Connie   6/23/2017 9:30 AM Mali Head PTA [facility]   6/27/2017 11:30 AM Sara Patterson PTA [facility]   6/27/2017 1:00 PM TATO AgrawalMD JIN SCHED   6/28/2017 9:30 AM Mali Head PTA [facility]   6/30/2017 9:30 AM Antonio Bowers PTA [facility]   11/17/2017 9:15 AM IO NURSE VISIT [facility] JIN SCHED   12/1/2017 2:00 PM Kaia Guzman MD [facility]

## 2017-06-23 ENCOUNTER — HOSPITAL ENCOUNTER (OUTPATIENT)
Dept: PHYSICAL THERAPY | Age: 72
Discharge: HOME | End: 2017-06-23
Payer: COMMERCIAL

## 2017-06-23 PROCEDURE — 97110 THERAPEUTIC EXERCISES: CPT

## 2017-06-23 PROCEDURE — 97140 MANUAL THERAPY 1/> REGIONS: CPT

## 2017-06-23 NOTE — PROGRESS NOTES
PT DAILY TREATMENT NOTE     Patient Name: You Griggs  Date:2017  : 1945  [x]  Patient  Verified  Payor: BLUE CROSS / Plan: [employer] [facility] / Product Type: PPO /    In time: 9:21  Out time:10:12  Total Treatment Time (min): 51  Visit #: 7 of 12    Treatment Area: Complete rotator cuff tear or rupture of left shoulder, not specified as traumatic [M75.122]    SUBJECTIVE  Pain Level (0-10 scale): 0/10  Any medication changes, allergies to medications, adverse drug reactions, diagnosis change, or new procedure performed?: [x] No    [] Yes (see summary sheet for update)  Subjective functional status/changes:   [] No changes reported  Pt reports no changes since last visit. OBJECTIVE    Modality rationale: decrease pain to improve the patients ability to increase ease of ADLs.     Min Type Additional Details    [] Estim:  []Unatt       []IFC  []Premod                        []Other:  []w/ice   []w/heat  Position:  Location:    [] Estim: []Att    []TENS instruct  []NMES                    []Other:  []w/US   []w/ice   []w/heat  Position:  Location:    []  Traction: [] Cervical       []Lumbar                       [] Prone          []Supine                       []Intermittent   []Continuous Lbs:  [] before manual  [] after manual    []  Ultrasound: []Continuous   [] Pulsed                           []1MHz   []3MHz W/cm2:  Location:    []  Iontophoresis with dexamethasone         Location: [] Take home patch   [] In clinic   10 [x]  Ice     []  heat  []  Ice massage  []  Laser   []  Anodyne Position: reclined  Location: L shoulder    []  Laser with stim  []  Other:  Position:  Location:    []  Vasopneumatic Device Pressure:       [] lo [] med [] hi   Temperature: [] lo [] med [] hi   [] Skin assessment post-treatment:  []intact []redness- no adverse reaction    []redness  adverse reaction:     29 min Therapeutic Exercise:  [x] See flow sheet :   Rationale: increase ROM and increase strength to improve the patients ability to perform ADLs. 12 min Manual Therapy:  Per flow sheet   Rationale: decrease pain, increase ROM, increase tissue extensibility and decrease trigger points to increase ease of ADLs. With   [] TE   [] TA   [] neuro   [] other: Patient Education: [x] Review HEP    [] Progressed/Changed HEP based on:   [] positioning   [] body mechanics   [] transfers   [] heat/ice application    [] other:      Other Objective/Functional Measures:  PROM flexion 133 degrees. Pain Level (0-10 scale) post treatment: 0/10    ASSESSMENT/Changes in Function: Cont per POC. Patient will continue to benefit from skilled PT services to modify and progress therapeutic interventions, address functional mobility deficits, address ROM deficits and address strength deficits to attain remaining goals. []  See Plan of Care  []  See progress note/recertification  []  See Discharge Summary         Progress towards goals / Updated goals:  Long term goals: to be accomplished in 4 weeks  1) Pt's FOTO score will improve > or = 59 indicating improvements in function. At PN: Progressing, 31, an increase of 9  Current: Progressing, FOTO = 47, 6/16/2017  2) Pt will improve L shoulder PROM flex > or = 140 deg for ease with ADL's. At PN: Progressing, flex 115 degrees  Current: Progressing: flex 133 degrees 6/23/17  3) Pt will reach 150 degrees of flex and scap AAROM L shoulder to increase ease of ADLs. At PN: flex 100 degrees, scap 90 degrees  Current: Progressing, flex 111 degrees, scap 95 degrees. 6/12/17  4) Pt will reach 130 degrees of flex and scap AROM L shoulder to increase ease of household chores. At PN: Not tested. Current: Progressing,Seated AROM flex 93 degrees, scap 60 degrees. 6/19/17  5) Pt will improve MMT L shoulder to > or = to 3+-4/5 for ease w/ reaching into cabinets. At PN: Not tested.     PLAN  []  Upgrade activities as tolerated     [x]  Continue plan of care  []  Update interventions per flow sheet       []  Discharge due to:_  []  Other:_      Kalpana Lynn PTA 6/23/2017  10:20 AM    Future Appointments  Date Time Provider Pat Rosales   6/27/2017 11:30 AM Vinny Orr PTA [facility]   6/27/2017 1:00 PM TATO Goff   6/28/2017 9:30 AM Mali Willams PTA [facility]   6/30/2017 9:30 AM Kalpana Lynn PTA [facility]   11/17/2017 9:15 AM IO NURSE VISIT [facility] JNI RODGERS   12/1/2017 2:00 PM Forrest Barnes MD [facility]

## 2017-06-27 ENCOUNTER — HOSPITAL ENCOUNTER (OUTPATIENT)
Dept: PHYSICAL THERAPY | Age: 72
Discharge: HOME | End: 2017-06-27
Payer: COMMERCIAL

## 2017-06-27 ENCOUNTER — OFFICE VISIT (OUTPATIENT)
Dept: ORTHOPEDIC SURGERY | Age: 72
End: 2017-06-27

## 2017-06-27 VITALS
WEIGHT: 227 LBS | SYSTOLIC BLOOD PRESSURE: 147 MMHG | HEART RATE: 70 BPM | HEIGHT: 64 IN | BODY MASS INDEX: 38.76 KG/M2 | DIASTOLIC BLOOD PRESSURE: 60 MMHG

## 2017-06-27 DIAGNOSIS — M75.122 COMPLETE TEAR OF LEFT ROTATOR CUFF: Primary | ICD-10-CM

## 2017-06-27 PROCEDURE — 97110 THERAPEUTIC EXERCISES: CPT

## 2017-06-27 PROCEDURE — 97140 MANUAL THERAPY 1/> REGIONS: CPT

## 2017-06-27 NOTE — PROGRESS NOTES
Jacky Devries  1945     HISTORY OF PRESENT ILLNESS  Jacky Devries is a 70 y.o. female who presents today for evaluation s/p left shoulder arthroscopic rotator cuff repair and subscapularis repair on 4/19/17. Patient has been going to PT. Describes pain as a 0/10. SHe is doing very well. Very happy with her progress. Is now sleeping in her bed. Patient denies any fever, chills, chest pain, shortness of breath or calf pain. There are no new illness or injuries to report since last seen in the office. PHYSICAL EXAM:   Visit Vitals    /60    Pulse 70    Ht 5' 4\" (1.626 m)    Wt 227 lb (103 kg)    BMI 38.96 kg/m2      The patient is a well-developed, well-nourished female in no acute distress. The patient is alert and oriented times three. The patient appears to be well groomed. Mood and affect are normal.  ORTHOPEDIC EXAM of left shoulder:  Inspection: swelling not present,  Bruising not present  Incisions well healed  Passive glenohumeral abduction 0-85 degrees, able to reach up over her head  Stability: Stable  Strength: 4/5  2+ distal pulses    IMPRESSION:  S/P Left shoulder arthroscopic rotator cuff repair and subscapularis repair    PLAN:   1. Patient continues to progress post operatively  2. Will finish up with her PT and continue with her home exercise plan  3.  No lifting greater than 5lbs for next 4 weeks than nothing heavier than 10lbs  RTC 6 weeks    MAKENNA [employer] [facility]

## 2017-06-27 NOTE — PROGRESS NOTES
PT DAILY TREATMENT NOTE     Patient Name: Jose Jean  Date:2017  : 1945  [x]  Patient  Verified  Payor: BLUE CROSS / Plan: [facility] [facility] / Product Type: PPO /    In time:11:17  Out time:12:07  Total Treatment Time (min): 50  Visit #: 8 of 12    Treatment Area: Complete rotator cuff tear or rupture of left shoulder, not specified as traumatic [M75.122]    SUBJECTIVE  Pain Level (0-10 scale): 0  Any medication changes, allergies to medications, adverse drug reactions, diagnosis change, or new procedure performed?: [x] No    [] Yes (see summary sheet for update)  Subjective functional status/changes:   [] No changes reported  Pt reports her arm is getting better every day. OBJECTIVE    Modality rationale: decrease pain to improve the patients ability to decrease difficulty while performing tasks.     Min Type Additional Details    [] Estim:  []Unatt       []IFC  []Premod                        []Other:  []w/ice   []w/heat  Position:  Location:    [] Estim: []Att    []TENS instruct  []NMES                    []Other:  []w/US   []w/ice   []w/heat  Position:  Location:    []  Traction: [] Cervical       []Lumbar                       [] Prone          []Supine                       []Intermittent   []Continuous Lbs:  [] before manual  [] after manual    []  Ultrasound: []Continuous   [] Pulsed                           []1MHz   []3MHz W/cm2:  Location:    []  Iontophoresis with dexamethasone         Location: [] Take home patch   [] In clinic   10 [x]  Ice     []  heat  []  Ice massage  []  Laser   []  Anodyne Position:sitting  Location:L shoulder    []  Laser with stim  []  Other:  Position:  Location:    []  Vasopneumatic Device Pressure:       [] lo [] med [] hi   Temperature: [] lo [] med [] hi   [] Skin assessment post-treatment:  []intact []redness- no adverse reaction    []redness  adverse reaction:       40 min Therapeutic Exercise:  [x] See flow sheet :   Rationale: increase ROM and increase strength to improve the patients ability to increase tolerance to activities. 10 min Manual Therapy:  Per flow sheet   Rationale: decrease pain, increase ROM, increase tissue extensibility and decrease trigger points to increase ease with ADLs. With   [] TE   [] TA   [] neuro   [] other: Patient Education: [x] Review HEP    [] Progressed/Changed HEP based on:   [] positioning   [] body mechanics   [] transfers   [] heat/ice application    [] other:      Other Objective/Functional Measures: Seated AROM flex 115 degrees, scap 101 degrees. Pain Level (0-10 scale) post treatment: 0    ASSESSMENT/Changes in Function: Pt had no c/o increase in pain throughout session. Pt was TTP along subscap. Patient will continue to benefit from skilled PT services to modify and progress therapeutic interventions, address functional mobility deficits, address ROM deficits, address strength deficits and analyze and address soft tissue restrictions to attain remaining goals. []  See Plan of Care  []  See progress note/recertification  []  See Discharge Summary         Progress towards goals / Updated goals:  Long term goals: to be accomplished in 4 weeks  1) Pt's FOTO score will improve > or = 59 indicating improvements in function. At PN: Progressing, 31, an increase of 9  Current: Progressing, FOTO = 47, 6/16/2017  2) Pt will improve L shoulder PROM flex > or = 140 deg for ease with ADL's. At PN: Progressing, flex 115 degrees  Current: Progressing: flex 133 degrees 6/23/17  3) Pt will reach 150 degrees of flex and scap AAROM L shoulder to increase ease of ADLs. At PN: flex 100 degrees, scap 90 degrees  Current: Progressing, flex 111 degrees, scap 95 degrees. 6/12/17  4) Pt will reach 130 degrees of flex and scap AROM L shoulder to increase ease of household chores. At PN: Not tested. Current: Progressing,Seated AROM flex 115 degrees, scap 101 degrees.  6/27/17  5) Pt will improve MMT L shoulder to > or = to 3+-4/5 for ease w/ reaching into cabinets. At PN: Not tested.     PLAN  []  Upgrade activities as tolerated     [x]  Continue plan of care  []  Update interventions per flow sheet       []  Discharge due to:_  []  Other:_      William Love PTA 6/27/2017  11:47 AM    Future Appointments  Date Time Provider Pat Connie   6/27/2017 1:00 PM TATO Rodríguez [facility]   6/28/2017 9:30 AM Mali Head PTA [facility]   6/30/2017 9:30 AM Mali Escamilla PTA [facility]   11/17/2017 9:15 AM [facility] NURSE VISIT [facility] JIN RODGERS   12/1/2017 2:00 PM Geetha Henriquez MD [facility]

## 2017-06-28 ENCOUNTER — HOSPITAL ENCOUNTER (OUTPATIENT)
Dept: PHYSICAL THERAPY | Age: 72
Discharge: HOME | End: 2017-06-28
Payer: COMMERCIAL

## 2017-06-28 PROCEDURE — 97140 MANUAL THERAPY 1/> REGIONS: CPT

## 2017-06-28 PROCEDURE — 97110 THERAPEUTIC EXERCISES: CPT

## 2017-06-28 NOTE — PROGRESS NOTES
PT DAILY TREATMENT NOTE     Patient Name: Allegra Manner  Date:2017  : 1945  [x]  Patient  Verified  Payor: BLUE CROSS / Plan: [employer] [facility] [place] / Product Type: PPO /    In time: 9:21  Out time: 10:18  Total Treatment Time (min): 62  Visit #: 9 of 12    Treatment Area: Complete rotator cuff tear or rupture of left shoulder, not specified as traumatic [M75.122]    SUBJECTIVE  Pain Level (0-10 scale): 0/10  Any medication changes, allergies to medications, adverse drug reactions, diagnosis change, or new procedure performed?: [x] No    [] Yes (see summary sheet for update)  Subjective functional status/changes:   [] No changes reported  Pt reports the MD said her arm was looking good and she could finish up with PT.    OBJECTIVE    Modality rationale: decrease pain to improve the patients ability to increase ease of ADLs.     Min Type Additional Details    [] Estim:  []Unatt       []IFC  []Premod                        []Other:  []w/ice   []w/heat  Position:  Location:    [] Estim: []Att    []TENS instruct  []NMES                    []Other:  []w/US   []w/ice   []w/heat  Position:  Location:    []  Traction: [] Cervical       []Lumbar                       [] Prone          []Supine                       []Intermittent   []Continuous Lbs:  [] before manual  [] after manual    []  Ultrasound: []Continuous   [] Pulsed                           []1MHz   []3MHz W/cm2:  Location:    []  Iontophoresis with dexamethasone         Location: [] Take home patch   [] In clinic   10 [x]  Ice     []  heat  []  Ice massage  []  Laser   []  Anodyne Position: reclined  Location: R shoulder    []  Laser with stim  []  Other:  Position:  Location:    []  Vasopneumatic Device Pressure:       [] lo [] med [] hi   Temperature: [] lo [] med [] hi   [] Skin assessment post-treatment:  []intact []redness- no adverse reaction    []redness  adverse reaction:     37 min Therapeutic Exercise:  [x] See flow sheet : Rationale: increase ROM and increase strength to improve the patients ability to perform ADLs. 10 min Manual Therapy:  Per flow sheet   Rationale: decrease pain, increase ROM and increase tissue extensibility to increase ease of ADLs. With   [] TE   [] TA   [] neuro   [] other: Patient Education: [x] Review HEP    [] Progressed/Changed HEP based on:   [] positioning   [] body mechanics   [] transfers   [] heat/ice application    [] other:      Other Objective/Functional Measures: see goals. Pain Level (0-10 scale) post treatment: 0/10    ASSESSMENT/Changes in Function: Pt is progressing with PT. PROM flexion has increased to 140 degrees with firm end feel. AAROM flex reaches 124 degs and scap reachs 111 degs. AROM  reaches 115 degs of flex and 101 of scap. MMT flex 3/5, scap 2+/5, IR 4/5, ER 4-/5. Pt is generally pain free. Patient will continue to benefit from skilled PT services to modify and progress therapeutic interventions, address functional mobility deficits, address ROM deficits and address strength deficits to attain remaining goals. []  See Plan of Care  [x]  See progress note/recertification  []  See Discharge Summary         Progress towards goals / Updated goals:  Long term goals: to be accomplished in 4 weeks  1) Pt's FOTO score will improve > or = 59 indicating improvements in function. At PN: Progressing, 31, an increase of 9  Current: Progressing, FOTO = 47, 6/16/2017  2) Pt will improve L shoulder PROM flex > or = 140 deg for ease with ADL's. At PN: Progressing, flex 115 degrees  Current: Met: flex 140 degrees 6/28/17  3) Pt will reach 150 degrees of flex and scap AAROM L shoulder to increase ease of ADLs. At PN: flex 100 degrees, scap 90 degrees  Current: Progressing, flex 124 degrees, scap 111 degrees. 6/28/17  4) Pt will reach 130 degrees of flex and scap AROM L shoulder to increase ease of household chores. At PN: Not tested.   Current: Progressing,Seated AROM flex 115 degrees, scap 101 degrees. 6/27/17  5) Pt will improve MMT L shoulder to > or = to 3+-4/5 for ease w/ reaching into cabinets. At PN: Not tested.   Current: Not met, flex 3/5, scap 2+/5, IR 4/5, ER 4-/5  6/28/17    PLAN  []  Upgrade activities as tolerated     []  Continue plan of care  []  Update interventions per flow sheet       [x]  Discharge due to: DC NV per MD order (as pt reports)  []  Other:_      Mali Head PTA 6/28/2017  9:45 AM    Future Appointments  Date Time Provider Pat Sadleristi   6/30/2017 9:30 AM Mali Bird PTA [facility]   8/8/2017 1:30 PM TATO Oconnor [facility] SCH[facility]   11/17/2017 9:15 AM IO NURSE VISIT [facility] SCHED   12/1/2017 2:00 PM Jonatan Woodward MD [facility]

## 2017-06-29 NOTE — PROGRESS NOTES
[facility]              [address]        [place], [address]   [phone] fax    Progress Note  Patient name: Moo Martin Start of Care: 17   Referral source: Hollietrini Edge : 1945   Medical/Treatment Diagnosis: Complete rotator cuff tear or rupture of left shoulder, not specified as traumatic [M75.122] Onset Date:17     Prior Hospitalization: see medical history Provider#: [id number]   Medications: Verified on Patient Summary List    Comorbidities: Arthritis, Asthma, Back pain, BMI over 30, HTN  Prior Level of Function: Pt is retired but (I) with all ADL's and house chores. Visits from Start of Care: 30    Missed Visits: 1    Established Goals:        Excellent         Good         Limited            None  [] Increased ROM   []  []  []  []  [] Increased Strength  []  []  []  []  [] Increased Mobility  []  []  []  []   [] Decreased Pain   []  []  []  []  [] Decreased Swelling  []  []  []  []    Key Functional Changes:   Progress towards goals / Updated goals:  Long term goals: to be accomplished in 4 weeks  1) Pt's FOTO score will improve > or = 59 indicating improvements in function. At PN: Progressing, 31, an increase of 9  Current: Progressing, FOTO = 47  2) Pt will improve L shoulder PROM flex > or = 140 deg for ease with ADL's. At PN: Progressing, flex 115 degrees  Current: Met: flex 140 degrees   3) Pt will reach 150 degrees of flex and scap AAROM L shoulder to increase ease of ADLs. At PN: flex 100 degrees, scap 90 degrees  Current: Progressing, flex 124 degrees, scap 111 degrees  4) Pt will reach 130 degrees of flex and scap AROM L shoulder to increase ease of household chores. At PN: Not tested. Current: Progressing,Seated AROM flex 115 degrees, scap 101 degrees  5) Pt will improve MMT L shoulder to > or = to 3+-4/5 for ease w/ reaching into cabinets. At PN: Not tested.   Current: Not met, flex 3/5, scap 2+/5, IR 4/5, ER 4-/5      Pt is progressing with PT. PROM flexion has increased to 140 degrees with firm end feel. AAROM flex reaches 124 degs and scap reachs 111 degs. AROM  reaches 115 degs of flex and 101 of scap. MMT flex 3/5, scap 2+/5, IR 4/5, ER 4-/5. Pt is generally pain free.       Patient will continue to benefit from skilled PT services to modify and progress therapeutic interventions, address functional mobility deficits, address ROM deficits and address strength deficits to attain remaining goals. Updated Goals: to be achieved in 1 weeks:   Continue w/ unmet goals above    ASSESSMENT/RECOMMENDATIONS:  [x]Continue therapy per initial plan/protocol at a frequency of  1 x per week for 1 weeks  []Continue therapy with the following recommended changes:_____________________      _____________________________________________________________________  []Discontinue therapy progressing towards or have reached established goals  []Discontinue therapy due to lack of appreciable progress towards goals  []Discontinue therapy due to lack of attendance or compliance  []Await Physician's recommendations/decisions regarding therapy  []Other:________________________________________________________________    Thank you for this referral.    Dipika Griggs, PT 6/29/2017 6:19 PM  NOTE TO PHYSICIAN:  PLEASE COMPLETE THE ORDERS BELOW AND   FAX TO [facility]: [phone]  If you are unable to process this request in 24 hours please contact our office: [phone]    []  I have read the above report and request that my patient continue as recommended. []  I have read the above report and request that my patient continue therapy with the following changes/special instructions:________________________________________  []I have read the above report and request that my patient be discharged from therapy.     Physicians signature: ________________________________Date: _____Time:_____

## 2017-06-30 ENCOUNTER — HOSPITAL ENCOUNTER (OUTPATIENT)
Dept: PHYSICAL THERAPY | Age: 72
Discharge: HOME | End: 2017-06-30
Payer: COMMERCIAL

## 2017-06-30 PROCEDURE — 97110 THERAPEUTIC EXERCISES: CPT

## 2017-06-30 NOTE — PROGRESS NOTES
PT DAILY TREATMENT NOTE     Patient Name: Dipti Buchanan  Date:2017  : 1945  [x]  Patient  Verified  Payor: BLUE CROSS / Plan: [employer] [facility] [place] / Product Type: PPO /    In time: 9:26  Out time:10:16  Total Treatment Time (min): 50  Visit #: 1 of 1    Treatment Area: Complete rotator cuff tear or rupture of left shoulder, not specified as traumatic [M75.122]    SUBJECTIVE  Pain Level (0-10 scale): 0/10  Any medication changes, allergies to medications, adverse drug reactions, diagnosis change, or new procedure performed?: [x] No    [] Yes (see summary sheet for update)  Subjective functional status/changes:   [] No changes reported  Pt states she is was able to put dishes in the cabinet without trouble and also able to sweep without pain. OBJECTIVE    Modality rationale: decrease pain to improve the patients ability to increase ease of ADLs.     Min Type Additional Details    [] Estim:  []Unatt       []IFC  []Premod                        []Other:  []w/ice   []w/heat  Position:  Location:    [] Estim: []Att    []TENS instruct  []NMES                    []Other:  []w/US   []w/ice   []w/heat  Position:  Location:    []  Traction: [] Cervical       []Lumbar                       [] Prone          []Supine                       []Intermittent   []Continuous Lbs:  [] before manual  [] after manual    []  Ultrasound: []Continuous   [] Pulsed                           []1MHz   []3MHz W/cm2:  Location:    []  Iontophoresis with dexamethasone         Location: [] Take home patch   [] In clinic   10 [x]  Ice     []  heat  []  Ice massage  []  Laser   []  Anodyne Position:  reclined  Location:  L shoulder    []  Laser with stim  []  Other:  Position:  Location:    []  Vasopneumatic Device Pressure:       [] lo [] med [] hi   Temperature: [] lo [] med [] hi   [] Skin assessment post-treatment:  []intact []redness- no adverse reaction    []redness  adverse reaction:     40 min Therapeutic Exercise: [x] See flow sheet :   Rationale: increase ROM and increase strength to improve the patients ability to perform ADLs. With   [] TE   [] TA   [] neuro   [] other: Patient Education: [x] Review HEP    [] Progressed/Changed HEP based on:   [] positioning   [] body mechanics   [] transfers   [] heat/ice application    [] other     Other Objective/Functional Measures:  FOTO 56.      Pain Level (0-10 scale) post treatment: 0/10    ASSESSMENT/Changes in Function: FOTO has increased by 34 since last assessment. See P. Note from last visit for full reassessment. []  See Plan of Care  []  See progress note/recertification  [x]  See Discharge Summary         Progress towards goals / Updated goals:  Long term goals: to be accomplished in 4 weeks  1) Pt's FOTO score will improve > or = 59 indicating improvements in function. At PN:  Progressing, FOTO = 47  Current: Progressing, FOTO 56, an increase of 34 since last assessment. 6/30/17  3) Pt will reach 150 degrees of flex and scap AAROM L shoulder to increase ease of ADLs. At PN: Progressing, flex 124 degrees, scap 111 degrees  4) Pt will reach 130 degrees of flex and scap AROM L shoulder to increase ease of household chores. At PN: Progressing,Seated AROM flex 115 degrees, scap 101 degrees  5) Pt will improve MMT L shoulder to > or = to 3+-4/5 for ease w/ reaching into cabinets.   At PN: Not met, flex 3/5, scap 2+/5, IR 4/5, ER 4-/5      PLAN  []  Upgrade activities as tolerated     []  Continue plan of care  []  Update interventions per flow sheet       [x]  Discharge due to: MD orders (per pt)  []  Other:_      Mali Head, PTA 6/30/2017  10:19 AM    Future Appointments  Date Time Provider Pat Rosales   8/8/2017 1:30 PM TATO Shrestha   11/17/2017 9:15 AM [facility] NURSE VISIT [facility] JIN RODGERS   12/1/2017 2:00 PM Jessica Jensen MD [facility]

## 2017-07-05 NOTE — PROGRESS NOTES
[facility]              [address]        [place], [address]   [phone] fax    Discharge Summary  Patient name: Shivani Lopez Start of Care: 17   Referral source: Cecil Selby : 1945   Medical/Treatment Diagnosis: Complete rotator cuff tear or rupture of left shoulder, not specified as traumatic [M75.122] Onset Date:17     Prior Hospitalization: see medical history Provider#: [id number]   Medications: Verified on Patient Summary List    Comorbidities: Arthritis, Asthma, Back pain, BMI over 30, HTN  Prior Level of Function: Pt is retired but (I) with all ADL's and house chores. Visits from Start of Care: 31    Missed Visits: 1  Reporting Period : 17 to 17    Summary of Care:  Progress towards goals / Updated goals:  Long term goals: to be accomplished in 4 weeks  1) Pt's FOTO score will improve > or = 59 indicating improvements in function. At PN: Progressing, 31, an increase of 9  Current: Progressing, FOTO = 56  2) Pt will improve L shoulder PROM flex > or = 140 deg for ease with ADL's. At PN: Progressing, flex 115 degrees  Current: Met: flex 140 degrees   3) Pt will reach 150 degrees of flex and scap AAROM L shoulder to increase ease of ADLs. At PN: flex 100 degrees, scap 90 degrees  Current: Progressing, flex 124 degrees, scap 111 degrees  4) Pt will reach 130 degrees of flex and scap AROM L shoulder to increase ease of household chores. At PN: Not tested. Current: Progressing,Seated AROM flex 115 degrees, scap 101 degrees  5) Pt will improve MMT L shoulder to > or = to 3+-4/5 for ease w/ reaching into cabinets. At PN: Not tested. Current: Not met, flex 3/5, scap 2+/5, IR 4/5, ER 4-/5      Pt states she is was able to put dishes in the cabinet without trouble and also able to sweep without pain.  PROM flexion has increased to 140 degrees with firm end feel.  AAROM flex reaches 124 degs and scap reachs 111 degs. AROM  reaches 115 degs of flex and 101 of scap. MMT flex 3/5, scap 2+/5, IR 4/5, ER 4-/5. DC to HEP at this time.      ASSESSMENT/RECOMMENDATIONS:  [x]Discontinue therapy: [x]Patient has reached or is progressing toward set goals      []Patient is non-compliant or has abdicated      []Due to lack of appreciable progress towards set Roc Briceño, PT 7/5/2017 7:37 AM

## 2017-07-14 ENCOUNTER — HOSPITAL ENCOUNTER (OUTPATIENT)
Dept: GENERAL RADIOLOGY | Age: 72
Discharge: HOME | End: 2017-07-14
Attending: INTERNAL MEDICINE
Payer: COMMERCIAL

## 2017-07-14 DIAGNOSIS — R13.10 DYSPHAGIA, UNSPECIFIED: ICD-10-CM

## 2017-07-14 PROCEDURE — 74011000250 HC RX REV CODE- 250

## 2017-07-14 PROCEDURE — 74011000255 HC RX REV CODE- 255

## 2017-07-14 PROCEDURE — 74220 X-RAY XM ESOPHAGUS 1CNTRST: CPT

## 2017-07-14 RX ADMIN — BARIUM SULFATE 135 ML: 980 POWDER, FOR SUSPENSION ORAL at 07:00

## 2017-07-14 RX ADMIN — BARIUM SULFATE 176 G: 960 POWDER, FOR SUSPENSION ORAL at 07:00

## 2017-07-14 RX ADMIN — BARIUM SULFATE 700 MG: 700 TABLET ORAL at 07:00

## 2017-07-14 RX ADMIN — ANTACID/ANTIFLATULENT 4 G: 380; 550; 10; 10 GRANULE, EFFERVESCENT ORAL at 07:00

## 2017-08-01 ENCOUNTER — OFFICE VISIT (OUTPATIENT)
Dept: ORTHOPEDIC SURGERY | Age: 72
End: 2017-08-01

## 2017-08-01 VITALS
OXYGEN SATURATION: 97 % | SYSTOLIC BLOOD PRESSURE: 151 MMHG | HEIGHT: 64 IN | WEIGHT: 231 LBS | BODY MASS INDEX: 39.44 KG/M2 | DIASTOLIC BLOOD PRESSURE: 69 MMHG | RESPIRATION RATE: 18 BRPM | HEART RATE: 77 BPM | TEMPERATURE: 97.1 F

## 2017-08-01 DIAGNOSIS — M75.122 COMPLETE TEAR OF LEFT ROTATOR CUFF: Primary | ICD-10-CM

## 2017-08-01 NOTE — PROGRESS NOTES
Isai Adame  1945     HISTORY OF PRESENT ILLNESS  Isai Adame is a 70 y.o. female who presents today for evaluation s/p left shoulder arthroscopic rotator cuff repair and subscapularis repair on 4/19/17. Patient has finished with PT and now is is doing a home program. Describes pain as a 0/10. She is doing very well. Very happy with her progress. Patient denies any fever, chills, chest pain, shortness of breath or calf pain. There are no new illness or injuries to report since last seen in the office. PHYSICAL EXAM:   Visit Vitals    /69    Pulse 77    Temp 97.1 °F (36.2 °C) (Oral)    Resp 18    Ht 5' 4\" (1.626 m)    Wt 231 lb (104.8 kg)    SpO2 97%    BMI 39.65 kg/m2      The patient is a well-developed, well-nourished female in no acute distress. The patient is alert and oriented times three. The patient appears to be well groomed. Mood and affect are normal.  LYMPHATIC: lymph nodes are not enlarged and are within normal limits  SKIN: normal in color and non tender to palpation. There are no bruises or abrasions noted. NEUROLOGICAL: Motor sensory exam is within normal limits. Reflexes are equal bilaterally. There is normal sensation to pinprick and light touch   ORTHOPEDIC EXAM of left shoulder:  Inspection: swelling not present,  Bruising not present  Incisions well healed  Passive glenohumeral abduction 0-90 degrees, able to reach up over her head  Stability: Stable  Strength: 4+/5  2+ distal pulses    IMPRESSION:  S/P Left shoulder arthroscopic rotator cuff repair and subscapularis repair    PLAN:   1. Patient continues to progress post operatively  2. She will cont with her HEP and gradually increase her activities  3.  Stressed to her no increases in pain    RTC PRN    MAKENNA [phone]

## 2017-10-10 RX ORDER — HYDROCHLOROTHIAZIDE 25 MG/1
TABLET ORAL
Qty: 90 TAB | Refills: 1 | Status: SHIPPED | OUTPATIENT
Start: 2017-10-10 | End: 2018-04-13 | Stop reason: SDUPTHER

## 2017-11-17 ENCOUNTER — HOSPITAL ENCOUNTER (OUTPATIENT)
Dept: LAB | Age: 72
Discharge: HOME | End: 2017-11-17
Payer: COMMERCIAL

## 2017-11-17 LAB
ALBUMIN SERPL-MCNC: 4.1 G/DL (ref 3.4–5)
ALBUMIN/GLOB SERPL: 1.4 {RATIO} (ref 0.8–1.7)
ALP SERPL-CCNC: 113 U/L (ref 45–117)
ALT SERPL-CCNC: 22 U/L (ref 13–56)
ANION GAP SERPL CALC-SCNC: 6 MMOL/L (ref 3–18)
AST SERPL-CCNC: 15 U/L (ref 15–37)
BILIRUB SERPL-MCNC: 0.5 MG/DL (ref 0.2–1)
BUN SERPL-MCNC: 20 MG/DL (ref 7–18)
BUN/CREAT SERPL: 20 (ref 12–20)
CALCIUM SERPL-MCNC: 10 MG/DL (ref 8.5–10.1)
CHLORIDE SERPL-SCNC: 103 MMOL/L (ref 100–108)
CHOLEST SERPL-MCNC: 165 MG/DL
CO2 SERPL-SCNC: 32 MMOL/L (ref 21–32)
CREAT SERPL-MCNC: 0.99 MG/DL (ref 0.6–1.3)
GLOBULIN SER CALC-MCNC: 3 G/DL (ref 2–4)
GLUCOSE SERPL-MCNC: 104 MG/DL (ref 74–99)
HDLC SERPL-MCNC: 52 MG/DL (ref 40–60)
HDLC SERPL: 3.2 {RATIO} (ref 0–5)
LDLC SERPL CALC-MCNC: 67 MG/DL (ref 0–100)
LIPID PROFILE,FLP: ABNORMAL
POTASSIUM SERPL-SCNC: 4.4 MMOL/L (ref 3.5–5.5)
PROT SERPL-MCNC: 7.1 G/DL (ref 6.4–8.2)
SODIUM SERPL-SCNC: 141 MMOL/L (ref 136–145)
TRIGL SERPL-MCNC: 230 MG/DL (ref ?–150)
VLDLC SERPL CALC-MCNC: 46 MG/DL

## 2017-11-17 PROCEDURE — 36415 COLL VENOUS BLD VENIPUNCTURE: CPT | Performed by: INTERNAL MEDICINE

## 2017-11-17 PROCEDURE — 80053 COMPREHEN METABOLIC PANEL: CPT | Performed by: INTERNAL MEDICINE

## 2017-11-17 PROCEDURE — 80061 LIPID PANEL: CPT | Performed by: INTERNAL MEDICINE

## 2017-11-23 DIAGNOSIS — E78.5 HYPERLIPIDEMIA LDL GOAL <100: ICD-10-CM

## 2017-12-01 ENCOUNTER — OFFICE VISIT (OUTPATIENT)
Dept: INTERNAL MEDICINE CLINIC | Age: 72
End: 2017-12-01

## 2017-12-01 VITALS
DIASTOLIC BLOOD PRESSURE: 70 MMHG | HEIGHT: 64 IN | RESPIRATION RATE: 16 BRPM | HEART RATE: 83 BPM | TEMPERATURE: 98.7 F | WEIGHT: 229 LBS | BODY MASS INDEX: 39.09 KG/M2 | SYSTOLIC BLOOD PRESSURE: 128 MMHG | OXYGEN SATURATION: 96 %

## 2017-12-01 DIAGNOSIS — H81.13 BENIGN PAROXYSMAL POSITIONAL VERTIGO DUE TO BILATERAL VESTIBULAR DISORDER: ICD-10-CM

## 2017-12-01 DIAGNOSIS — R73.01 IMPAIRED FASTING GLUCOSE: Primary | ICD-10-CM

## 2017-12-01 DIAGNOSIS — E78.5 HYPERLIPIDEMIA LDL GOAL <100: ICD-10-CM

## 2017-12-01 DIAGNOSIS — I11.9 HYPERTENSIVE HEART DISEASE WITHOUT HEART FAILURE: ICD-10-CM

## 2017-12-01 DIAGNOSIS — E78.5 HYPERLIPIDEMIA, UNSPECIFIED HYPERLIPIDEMIA TYPE: ICD-10-CM

## 2017-12-01 DIAGNOSIS — M43.06 SPONDYLOLYSIS, LUMBAR REGION: ICD-10-CM

## 2017-12-01 RX ORDER — CYCLOBENZAPRINE HCL 10 MG
10 TABLET ORAL
Qty: 30 TAB | Refills: 0 | Status: SHIPPED | OUTPATIENT
Start: 2017-12-01 | End: 2018-04-13 | Stop reason: SDUPTHER

## 2017-12-01 RX ORDER — ONDANSETRON 4 MG/1
4 TABLET, FILM COATED ORAL
Qty: 40 TAB | Refills: 1 | Status: SHIPPED | OUTPATIENT
Start: 2017-12-01

## 2017-12-01 RX ORDER — TRAMADOL HYDROCHLORIDE 50 MG/1
50 TABLET ORAL
Qty: 50 TAB | Refills: 0 | Status: SHIPPED | OUTPATIENT
Start: 2017-12-01 | End: 2018-12-11 | Stop reason: ALTCHOICE

## 2017-12-01 RX ORDER — SIMVASTATIN 20 MG/1
TABLET, FILM COATED ORAL
Qty: 90 TAB | Refills: 3 | Status: SHIPPED | OUTPATIENT
Start: 2017-12-01 | End: 2018-12-10 | Stop reason: SDUPTHER

## 2017-12-01 NOTE — MR AVS SNAPSHOT
Visit Information Date & Time Provider Department Dept. Phone Encounter #  
 12/1/2017  2:00 PM Jonathan Ashley MD [facility] [phone] [id number] Your Appointments 6/4/2018  9:15 AM  
PHYSICAL with Jonathan Ashley MD  
[facility]) Appt Note: rpe  
 [address], [address] [address] [address]  
  
   
 [address], [address] Upcoming Health Maintenance Date Due Influenza Age 5 to Adult 8/1/2017 MEDICARE YEARLY EXAM 11/24/2017 GLAUCOMA SCREENING Q2Y 6/1/2018 BREAST CANCER SCRN MAMMOGRAM 11/3/2018 COLONOSCOPY 10/10/2023 DTaP/Tdap/Td series (2 - Td) 11/25/2026 Allergies as of 12/1/2017  Review Complete On: 12/1/2017 By: Jonathan [facility] MD  
  
 Severity Noted Reaction Type Reactions Macrobid [Nitrofurantoin Monohyd/m-cryst] High 09/25/2015   Not Verified Anaphylaxis Aspirin  07/16/2015    Unknown (comments) Cannot take due to gastric bypass. Lisinopril  08/12/2014    Cough Parafon Forte Dsc [Chlorzoxazone]  10/20/2011   Side Effect Other (comments) Excessive drowsiness Current Immunizations  Reviewed on 8/12/2014 Name Date H1N1 FLU VACCINE 11/3/2009 Influenza High Dose Vaccine PF 10/15/2016, 10/14/2015, 10/7/2014 Influenza Vaccine PF 10/30/2013 Influenza Vaccine Split 11/6/2012, 11/9/2011 10:35 AM  
 Influenza Vaccine Whole 9/30/2010 Pneumococcal Conjugate (PCV-13) 8/17/2015  8:01 AM  
 Td 1/1/2007 Tdap 11/25/2016 ZZZ-RETIRED (DO NOT USE) Pneumococcal Vaccine (Unspecified Type) 9/30/2010 Zoster Vaccine, Live 1/1/2009 Not reviewed this visit You Were Diagnosed With   
  
 Codes Comments Impaired fasting glucose    -  Primary ICD-10-CM: R73.01 
ICD-9-CM: 790.21 Hyperlipidemia, unspecified hyperlipidemia type     ICD-10-CM: E78.5 ICD-9-CM: 272.4 Spondylolysis, lumbar region     ICD-10-CM: M43.06 
ICD-9-CM: 738.4 Benign paroxysmal positional vertigo due to bilateral vestibular disorder     ICD-10-CM: H81.13 
ICD-9-CM: 386.11 Hypertensive heart disease without heart failure     ICD-10-CM: I11.9 ICD-9-CM: 402.90 Hyperlipidemia LDL goal <100     ICD-10-CM: E78.5 ICD-9-CM: 272.4 Vitals BP Pulse Temp Resp Height(growth percentile) Weight(growth percentile) 128/70 (BP 1 Location: Right arm, BP Patient Position: Sitting) 83 98.7 °F (37.1 °C) (Oral) 16 5' 4\" (1.626 m) 229 lb (103.9 kg) SpO2 BMI OB Status Smoking Status 96% 39.31 kg/m2 Hysterectomy Former Smoker Vitals History BMI and BSA Data Body Mass Index Body Surface Area  
 39.31 kg/m 2 2.17 m 2 Preferred Pharmacy Pharmacy Name Phone [facility] [facility] [phone] Your Updated Medication List  
  
   
This list is accurate as of: 12/1/17  2:23 PM.  Always use your most recent med list.  
  
  
  
  
 albuterol 90 mcg/actuation inhaler Commonly known as:  PROVENTIL HFA, VENTOLIN HFA, PROAIR HFA Take 1-2 Puffs by inhalation every four (4) hours as needed for Wheezing. cranberry extract 450 mg Tab tablet Take  by mouth. cyclobenzaprine 10 mg tablet Commonly known as:  FLEXERIL Take 1 Tab by mouth three (3) times daily as needed for Muscle Spasm(s). hydroCHLOROthiazide 25 mg tablet Commonly known as:  HYDRODIURIL  
TAKE ONE TABLET BY MOUTH ONCE DAILY  
  
 inhalational spacing device 1 Each by Does Not Apply route as needed. losartan 25 mg tablet Commonly known as:  COZAAR Take 1 Tab by mouth daily. MIRALAX PO Take  by mouth nightly. ondansetron hcl 4 mg tablet Commonly known as:  Funmil[place]o [place] Take 1 Tab by mouth every eight (8) hours as needed. OS- + D PO Take 1 Cap by mouth two (2) times a day. PriLOSEC 20 mg capsule Generic drug:  omeprazole Take 20 mg by mouth daily. simvastatin 20 mg tablet Commonly known as:  ZOCOR  
TAKE ONE TABLET BY MOUTH IN THE EVENING  
  
 traMADol 50 mg tablet Commonly known as:  ULTRAM  
Take 1 Tab by mouth every six (6) hours as needed for Pain. VITAMIN B-12 500 mcg tablet Generic drug:  cyanocobalamin Take 500 mcg by mouth daily. VITAMIN D3 1,000 unit tablet Generic drug:  cholecalciferol Take 1,000 Units by mouth two (2) times a day. Prescriptions Printed Refills  
 traMADol (ULTRAM) 50 mg tablet 0 Sig: Take 1 Tab by mouth every six (6) hours as needed for Pain. Class: Print Route: Oral  
 ondansetron hcl (ZOFRAN) 4 mg tablet 1 Sig: Take 1 Tab by mouth every eight (8) hours as needed. Class: Print Route: Oral  
 simvastatin (ZOCOR) 20 mg tablet 3 Sig: TAKE ONE TABLET BY MOUTH IN THE EVENING Class: Print  
 cyclobenzaprine (FLEXERIL) 10 mg tablet 0 Sig: Take 1 Tab by mouth three (3) times daily as needed for Muscle Spasm(s). Class: Print Route: Oral  
  
To-Do List   
 Around 05/25/2018 Lab:  CBC WITH AUTOMATED DIFF Around 05/25/2018 Lab:  LIPID PANEL Around 05/25/2018 Lab:  METABOLIC PANEL, COMPREHENSIVE Around 05/25/2018 Lab:  T4, FREE Around 05/25/2018 Lab:  TSH 3RD GENERATION Around 05/25/2018 Lab:  URINALYSIS W/ RFLX MICROSCOPIC Please provide this summary of care documentation to your next provider. Your primary care clinician is listed as Elmer Jorget. If you have any questions after today's visit, please call [phone].

## 2017-12-01 NOTE — PROGRESS NOTES
Chief Complaint   Patient presents with    Cholesterol Problem     6 month follow up with lab results. ROOM 9     Health Maintenance Due   Topic Date Due    Influenza Age 5 to Adult  08/01/2017    MEDICARE YEARLY EXAM  11/24/2017         1. Have you been to the ER, urgent care clinic or hospitalized since your last visit? NO.     2. Have you seen or consulted any other health care providers outside of the [address] [place] [address] since your last visit (Include any pap smears or colon screening)? NO      Do you have an Advanced Directive? YES, Patient states she has one and will bring it in.

## 2017-12-01 NOTE — PROGRESS NOTES
Angeline Simmonds 1945, is a 67 y.o. female, who is seen today for reevaluation of obesity hyperlipidemia impaired fasting glucose hypertension DJD. She notes that occasionally there is a sharp pain either in her arms or legs that goes away quickly. She is trying to watch her diet and has lost a few pounds, 2 pounds over the last 4 months. She takes all her medicine correctly. No chest pain or shortness of breath. Past Medical History:   Diagnosis Date    Asthma with COPD ([address]     Bilateral pulmonary embolism ([address] 09/2015    Unprovoked. Negative hypercoaguable workup. Completed 6 months of Xarelto.  Cervical spondylosis     Colon adenoma     CTS (carpal tunnel syndrome)     Cystitis cystica 02/2016    Intermittent symptomatic UTIs. Evaluation by Dr. Kendra Betnacourt. Negative abdom. CT scan and renal ultrasound. Cystoscopy showing diffuse cystitis cystica.  Family history of colon cancer     GERD (gastroesophageal reflux disease)     Hypertension     Labyrinthitis     Left sided sciatica     Lumbar spondylosis     Microhematuria     Multiple pulmonary nodules determined by computed tomography of lung 03/2016    Bilateral. 3-5 mm in size. Dr. Saw Segura.  Osteopenia     Prediabetes     Status post gastric bypass for obesity     Thyroid nodule 11/2015    Thyroid ultrasound with dominant 2.7 cm solid nodule mid-lower left lobe. Dr. Zaria Delgado. FNA with benign cytology. Current Outpatient Prescriptions   Medication Sig Dispense Refill    traMADol (ULTRAM) 50 mg tablet Take 1 Tab by mouth every six (6) hours as needed for Pain. 50 Tab 0    ondansetron hcl (ZOFRAN) 4 mg tablet Take 1 Tab by mouth every eight (8) hours as needed. 40 Tab 1    simvastatin (ZOCOR) 20 mg tablet TAKE ONE TABLET BY MOUTH IN THE EVENING 90 Tab 3    cyclobenzaprine (FLEXERIL) 10 mg tablet Take 1 Tab by mouth three (3) times daily as needed for Muscle Spasm(s).  30 Tab 0    hydroCHLOROthiazide (HYDRODIURIL) 25 mg tablet TAKE ONE TABLET BY MOUTH ONCE DAILY 90 Tab 1    losartan (COZAAR) 25 mg tablet Take 1 Tab by mouth daily. 90 Tab 3    cranberry extract 450 mg tab Take  by mouth.  albuterol (PROVENTIL HFA, VENTOLIN HFA, PROAIR HFA) 90 mcg/actuation inhaler Take 1-2 Puffs by inhalation every four (4) hours as needed for Wheezing. 1 Inhaler 3    inhalational spacing device 1 Each by Does Not Apply route as needed. 1 Device 0    cholecalciferol, vitamin d3, (VITAMIN D) 1,000 unit tablet Take 1,000 Units by mouth two (2) times a day.  cyanocobalamin (VITAMIN B-12) 500 mcg tablet Take 500 mcg by mouth daily.  CALCIUM CARBONATE/VITAMIN D3 (OS- + D PO) Take 1 Cap by mouth two (2) times a day.  POLYETHYLENE GLYCOL 3350 (MIRALAX PO) Take  by mouth nightly.  omeprazole (PRILOSEC) 20 mg capsule Take 20 mg by mouth daily. Visit Vitals    /70 (BP 1 Location: Right arm, BP Patient Position: Sitting)    Pulse 83    Temp 98.7 °F (37.1 °C) (Oral)    Resp 16    Ht 5' 4\" (1.626 m)    Wt 229 lb (103.9 kg)    SpO2 96%    BMI 39.31 kg/m2     Carotids are 2+ without bruits. Lungs are clear to percussion. Good breath sounds with no wheezing or crackles. Heart reveals a regular rhythm with normal S1 and S2 no murmur gallop click or rub. Apical impulse is nonpalpable. Abdomen is soft and nontender with no obvious hepatosplenomegaly or masses and no bruits. Extremities reveal no clubbing cyanosis or edema. Pulses are intact.     Results for orders placed or performed during the hospital encounter of 11/17/17   LIPID PANEL   Result Value Ref Range    LIPID PROFILE          Cholesterol, total 165 <200 MG/DL    Triglyceride 230 (H) <150 MG/DL    HDL Cholesterol 52 40 - 60 MG/DL    LDL, calculated 67 0 - 100 MG/DL    VLDL, calculated 46 MG/DL    CHOL/HDL Ratio 3.2 0 - 5.0     METABOLIC PANEL, COMPREHENSIVE   Result Value Ref Range    Sodium 141 136 - 145 mmol/L    Potassium 4.4 3.5 - 5.5 mmol/L    Chloride 103 100 - 108 mmol/L    CO2 32 21 - 32 mmol/L    Anion gap 6 3.0 - 18 mmol/L    Glucose 104 (H) 74 - 99 mg/dL    BUN 20 (H) 7.0 - 18 MG/DL    Creatinine 0.99 0.6 - 1.3 MG/DL    BUN/Creatinine ratio 20 12 - 20      GFR est AA >60 >60 ml/min/1.73m2    GFR est non-AA 55 (L) >60 ml/min/1.73m2    Calcium 10.0 8.5 - 10.1 MG/DL    Bilirubin, total 0.5 0.2 - 1.0 MG/DL    ALT (SGPT) 22 13 - 56 U/L    AST (SGOT) 15 15 - 37 U/L    Alk. phosphatase 113 45 - 117 U/L    Protein, total 7.1 6.4 - 8.2 g/dL    Albumin 4.1 3.4 - 5.0 g/dL    Globulin 3.0 2.0 - 4.0 g/dL    A-G Ratio 1.4 0.8 - 1.7       Assessment: #1.  Obesity slightly improved but still very much overweight. She will be working on weight loss and I explained to her why she should do that. 2.  Hyperlipidemia doing well. She will continue simvastatin 20 mg each evening. #3. Hypertension is controlled. She will continue hydrochlorthiazide 25 mg daily and losartan 25 mg daily. #4.  Impaired fasting glucose little changed, she will work on weight loss and avoidance of sweets. Follow-up in 6 months for complete evaluation and I will give her some more tramadol for her chronic back pain. Sam Wilson MD FACP    Please note: This document has been produced using voice recognition software. Unrecognized errors in transcription may be present.

## 2018-01-12 ENCOUNTER — HOSPITAL ENCOUNTER (OUTPATIENT)
Dept: MAMMOGRAPHY | Age: 73
Discharge: HOME | End: 2018-01-12
Attending: INTERNAL MEDICINE
Payer: MEDICARE

## 2018-01-12 DIAGNOSIS — Z12.31 VISIT FOR SCREENING MAMMOGRAM: ICD-10-CM

## 2018-01-12 PROCEDURE — 77063 BREAST TOMOSYNTHESIS BI: CPT

## 2018-04-13 RX ORDER — CYCLOBENZAPRINE HCL 10 MG
10 TABLET ORAL
Qty: 30 TAB | Refills: 0 | Status: SHIPPED | OUTPATIENT
Start: 2018-04-13 | End: 2020-01-27 | Stop reason: ALTCHOICE

## 2018-04-13 RX ORDER — HYDROCHLOROTHIAZIDE 25 MG/1
25 TABLET ORAL DAILY
Qty: 90 TAB | Refills: 1 | Status: SHIPPED | OUTPATIENT
Start: 2018-04-13 | End: 2018-10-01 | Stop reason: SDUPTHER

## 2018-04-13 NOTE — TELEPHONE ENCOUNTER
Last Visit: 12/01/2017 with MD Elisa Middleton    Next Appointment: 06/05/2018 with MD Elisa Middleton   Previous Refill Encounters: 12/01/2017 per MD Patrick Moore #30; 10/10/2017 per MD Leslee CRUZ #90 with 1 refill     Requested Prescriptions     Pending Prescriptions Disp Refills    cyclobenzaprine (FLEXERIL) 10 mg tablet 30 Tab 0     Sig: Take 1 Tab by mouth three (3) times daily as needed for Muscle Spasm(s).  hydroCHLOROthiazide (HYDRODIURIL) 25 mg tablet 90 Tab 1     Sig: Take 1 Tab by mouth daily.

## 2018-04-25 DIAGNOSIS — G43.109 MIGRAINE WITH VERTIGO: Primary | ICD-10-CM

## 2018-04-25 RX ORDER — DIAZEPAM 2 MG/1
2 TABLET ORAL
Qty: 30 TAB | Refills: 0 | Status: SHIPPED | OUTPATIENT
Start: 2018-04-25 | End: 2021-01-04

## 2018-05-10 DIAGNOSIS — R05.3 PERSISTENT COUGH: ICD-10-CM

## 2018-05-11 RX ORDER — LOSARTAN POTASSIUM 25 MG/1
25 TABLET ORAL DAILY
Qty: 90 TAB | Refills: 3 | Status: SHIPPED | OUTPATIENT
Start: 2018-05-11 | End: 2019-04-22 | Stop reason: SDUPTHER

## 2018-05-25 DIAGNOSIS — E78.5 HYPERLIPIDEMIA LDL GOAL <100: ICD-10-CM

## 2018-05-25 DIAGNOSIS — I11.9 HYPERTENSIVE HEART DISEASE WITHOUT HEART FAILURE: ICD-10-CM

## 2018-05-29 ENCOUNTER — HOSPITAL ENCOUNTER (OUTPATIENT)
Dept: LAB | Age: 73
Discharge: HOME | End: 2018-05-29
Payer: MEDICARE

## 2018-05-29 ENCOUNTER — APPOINTMENT (OUTPATIENT)
Dept: INTERNAL MEDICINE CLINIC | Age: 73
End: 2018-05-29

## 2018-05-29 LAB
ALBUMIN SERPL-MCNC: 3.9 G/DL (ref 3.4–5)
ALBUMIN/GLOB SERPL: 1.3 {RATIO} (ref 0.8–1.7)
ALP SERPL-CCNC: 111 U/L (ref 45–117)
ALT SERPL-CCNC: 20 U/L (ref 13–56)
ANION GAP SERPL CALC-SCNC: 6 MMOL/L (ref 3–18)
APPEARANCE UR: ABNORMAL
AST SERPL-CCNC: 13 U/L (ref 15–37)
BACTERIA URNS QL MICRO: ABNORMAL /HPF
BASOPHILS # BLD: 0.1 K/UL (ref 0–0.06)
BASOPHILS NFR BLD: 1 % (ref 0–2)
BILIRUB SERPL-MCNC: 0.4 MG/DL (ref 0.2–1)
BILIRUB UR QL: NEGATIVE
BUN SERPL-MCNC: 21 MG/DL (ref 7–18)
BUN/CREAT SERPL: 22 (ref 12–20)
CALCIUM SERPL-MCNC: 9.7 MG/DL (ref 8.5–10.1)
CHLORIDE SERPL-SCNC: 105 MMOL/L (ref 100–108)
CHOLEST SERPL-MCNC: 162 MG/DL
CO2 SERPL-SCNC: 30 MMOL/L (ref 21–32)
COLOR UR: YELLOW
CREAT SERPL-MCNC: 0.96 MG/DL (ref 0.6–1.3)
DIFFERENTIAL METHOD BLD: ABNORMAL
EOSINOPHIL # BLD: 0.1 K/UL (ref 0–0.4)
EOSINOPHIL NFR BLD: 2 % (ref 0–5)
EPITH CASTS URNS QL MICRO: ABNORMAL /LPF (ref 0–5)
ERYTHROCYTE [DISTWIDTH] IN BLOOD BY AUTOMATED COUNT: 13.1 % (ref 11.6–14.5)
GLOBULIN SER CALC-MCNC: 3.1 G/DL (ref 2–4)
GLUCOSE SERPL-MCNC: 99 MG/DL (ref 74–99)
GLUCOSE UR STRIP.AUTO-MCNC: NEGATIVE MG/DL
HCT VFR BLD AUTO: 42.9 % (ref 35–45)
HDLC SERPL-MCNC: 49 MG/DL (ref 40–60)
HDLC SERPL: 3.3 {RATIO} (ref 0–5)
HGB BLD-MCNC: 13.6 G/DL (ref 12–16)
HGB UR QL STRIP: ABNORMAL
KETONES UR QL STRIP.AUTO: NEGATIVE MG/DL
LDLC SERPL CALC-MCNC: 71.6 MG/DL (ref 0–100)
LEUKOCYTE ESTERASE UR QL STRIP.AUTO: ABNORMAL
LIPID PROFILE,FLP: ABNORMAL
LYMPHOCYTES # BLD: 2.2 K/UL (ref 0.9–3.6)
LYMPHOCYTES NFR BLD: 34 % (ref 21–52)
MCH RBC QN AUTO: 30.3 PG (ref 24–34)
MCHC RBC AUTO-ENTMCNC: 31.7 G/DL (ref 31–37)
MCV RBC AUTO: 95.5 FL (ref 74–97)
MONOCYTES # BLD: 0.5 K/UL (ref 0.05–1.2)
MONOCYTES NFR BLD: 8 % (ref 3–10)
NEUTS SEG # BLD: 3.6 K/UL (ref 1.8–8)
NEUTS SEG NFR BLD: 55 % (ref 40–73)
NITRITE UR QL STRIP.AUTO: POSITIVE
PH UR STRIP: 6.5 [PH] (ref 5–8)
PLATELET # BLD AUTO: 275 K/UL (ref 135–420)
PMV BLD AUTO: 9.9 FL (ref 9.2–11.8)
POTASSIUM SERPL-SCNC: 4.7 MMOL/L (ref 3.5–5.5)
PROT SERPL-MCNC: 7 G/DL (ref 6.4–8.2)
PROT UR STRIP-MCNC: NEGATIVE MG/DL
RBC # BLD AUTO: 4.49 M/UL (ref 4.2–5.3)
RBC #/AREA URNS HPF: 0 /HPF (ref 0–5)
SODIUM SERPL-SCNC: 141 MMOL/L (ref 136–145)
SP GR UR REFRACTOMETRY: 1.02 (ref 1–1.03)
T4 FREE SERPL-MCNC: 0.9 NG/DL (ref 0.7–1.5)
TRIGL SERPL-MCNC: 207 MG/DL (ref ?–150)
TSH SERPL DL<=0.05 MIU/L-ACNC: 1.01 UIU/ML (ref 0.36–3.74)
UROBILINOGEN UR QL STRIP.AUTO: 1 EU/DL (ref 0.2–1)
VLDLC SERPL CALC-MCNC: 41.4 MG/DL
WBC # BLD AUTO: 6.5 K/UL (ref 4.6–13.2)
WBC URNS QL MICRO: ABNORMAL /HPF (ref 0–4)

## 2018-05-29 PROCEDURE — 84439 ASSAY OF FREE THYROXINE: CPT | Performed by: INTERNAL MEDICINE

## 2018-05-29 PROCEDURE — 81001 URINALYSIS AUTO W/SCOPE: CPT | Performed by: INTERNAL MEDICINE

## 2018-05-29 PROCEDURE — 36415 COLL VENOUS BLD VENIPUNCTURE: CPT | Performed by: INTERNAL MEDICINE

## 2018-05-29 PROCEDURE — 85025 COMPLETE CBC W/AUTO DIFF WBC: CPT | Performed by: INTERNAL MEDICINE

## 2018-05-29 PROCEDURE — 80061 LIPID PANEL: CPT | Performed by: INTERNAL MEDICINE

## 2018-05-29 PROCEDURE — 80053 COMPREHEN METABOLIC PANEL: CPT | Performed by: INTERNAL MEDICINE

## 2018-05-29 PROCEDURE — 84443 ASSAY THYROID STIM HORMONE: CPT | Performed by: INTERNAL MEDICINE

## 2018-06-05 ENCOUNTER — OFFICE VISIT (OUTPATIENT)
Dept: INTERNAL MEDICINE CLINIC | Age: 73
End: 2018-06-05

## 2018-06-05 VITALS
HEART RATE: 78 BPM | WEIGHT: 224 LBS | HEIGHT: 64 IN | TEMPERATURE: 98.3 F | DIASTOLIC BLOOD PRESSURE: 66 MMHG | OXYGEN SATURATION: 98 % | RESPIRATION RATE: 14 BRPM | BODY MASS INDEX: 38.24 KG/M2 | SYSTOLIC BLOOD PRESSURE: 120 MMHG

## 2018-06-05 DIAGNOSIS — E66.9 OBESITY (BMI 30-39.9): ICD-10-CM

## 2018-06-05 DIAGNOSIS — R73.01 IMPAIRED FASTING GLUCOSE: ICD-10-CM

## 2018-06-05 DIAGNOSIS — Z00.00 ROUTINE GENERAL MEDICAL EXAMINATION AT A HEALTH CARE FACILITY: Primary | ICD-10-CM

## 2018-06-05 DIAGNOSIS — E78.5 HYPERLIPIDEMIA LDL GOAL <100: ICD-10-CM

## 2018-06-05 DIAGNOSIS — I10 PRIMARY HYPERTENSION: ICD-10-CM

## 2018-06-05 NOTE — PROGRESS NOTES
Saw Martin 1945, is a 67 y.o. female, who is seen today for routine physical exam and reevaluation of hypertension hyperlipidemia obesity impaired fasting glucose GERD. She feels generally well and is pleased that she has lost a few pounds over the last year. By our scale she has lost 3 pounds, she says it is 5 pounds from her last visit. She takes all of her medicine correctly and is avoiding sweets in her diet. No chest pain or dyspnea. No reflux symptoms as she continues omeprazole. Anxiety is doing well with occasional low-dose diazepam.    Past Medical History:   Diagnosis Date    Asthma with COPD ([address]     Bilateral pulmonary embolism ([facility] [address] 09/2015    Unprovoked. Negative hypercoaguable workup. Completed 6 months of Xarelto.  Cervical spondylosis     Colon adenoma     CTS (carpal tunnel syndrome)     Cystitis cystica 02/2016    Intermittent symptomatic UTIs. Evaluation by Dr. Surya Rehman. Negative abdom. CT scan and renal ultrasound. Cystoscopy showing diffuse cystitis cystica.  Family history of colon cancer     GERD (gastroesophageal reflux disease)     Hypertension     Labyrinthitis     Left sided sciatica     Lumbar spondylosis     Microhematuria     Multiple pulmonary nodules determined by computed tomography of lung 03/2016    Bilateral. 3-5 mm in size. Dr. Jaime Osgood.  Osteopenia     Prediabetes     Status post gastric bypass for obesity     Thyroid nodule 11/2015    Thyroid ultrasound with dominant 2.7 cm solid nodule mid-lower left lobe. Dr. Arya Cuellar. FNA with benign cytology.       Past Surgical History:   Procedure Laterality Date    APPENDECTOMY      HX APPENDECTOMY      HX CARPAL TUNNEL RELEASE      twice on left, once on right    HX CHOLECYSTECTOMY      HX GASTRIC BYPASS      HX GASTRIC BYPASS      HX HEMORRHOIDECTOMY      HX HERNIA REPAIR      HX HYSTERECTOMY      HX SHOULDER ARTHROSCOPY Left     LAP,CHOLECYSTECTOMY       Current Outpatient Prescriptions   Medication Sig Dispense Refill    losartan (COZAAR) 25 mg tablet Take 1 Tab by mouth daily. 90 Tab 3    diazePAM (VALIUM) 2 mg tablet Take 1 Tab by mouth every six (6) hours as needed for Anxiety. Max Daily Amount: 8 mg. 30 Tab 0    cyclobenzaprine (FLEXERIL) 10 mg tablet Take 1 Tab by mouth three (3) times daily as needed for Muscle Spasm(s). 30 Tab 0    hydroCHLOROthiazide (HYDRODIURIL) 25 mg tablet Take 1 Tab by mouth daily. 90 Tab 1    traMADol (ULTRAM) 50 mg tablet Take 1 Tab by mouth every six (6) hours as needed for Pain. 50 Tab 0    ondansetron hcl (ZOFRAN) 4 mg tablet Take 1 Tab by mouth every eight (8) hours as needed. 40 Tab 1    simvastatin (ZOCOR) 20 mg tablet TAKE ONE TABLET BY MOUTH IN THE EVENING 90 Tab 3    cranberry extract 450 mg tab Take  by mouth.  albuterol (PROVENTIL HFA, VENTOLIN HFA, PROAIR HFA) 90 mcg/actuation inhaler Take 1-2 Puffs by inhalation every four (4) hours as needed for Wheezing. 1 Inhaler 3    cholecalciferol, vitamin d3, (VITAMIN D) 1,000 unit tablet Take 1,000 Units by mouth two (2) times a day.  cyanocobalamin (VITAMIN B-12) 500 mcg tablet Take 500 mcg by mouth daily.  CALCIUM CARBONATE/VITAMIN D3 (OS- + D PO) Take 1 Cap by mouth two (2) times a day.  POLYETHYLENE GLYCOL 3350 (MIRALAX PO) Take  by mouth nightly.  omeprazole (PRILOSEC) 20 mg capsule Take 20 mg by mouth daily.  inhalational spacing device 1 Each by Does Not Apply route as needed. 1 Device 0     Allergies   Allergen Reactions    Macrobid [Nitrofurantoin Monohyd/M-Cryst] Anaphylaxis    Aspirin Unknown (comments)     Cannot take due to gastric bypass.     Lisinopril Cough    Parafon Forte Dsc [Chlorzoxazone] Other (comments)     Excessive drowsiness     Social History     Social History    Marital status:      Spouse name: N/A    Number of children: N/A    Years of education: N/A     Social History Main Topics    Smoking status: Former Smoker     Packs/day: 3.00     Years: 20.00     Types: Cigarettes     Start date: 9/16/1963     Quit date: 1/5/1983    Smokeless tobacco: Never Used    Alcohol use No    Drug use: No    Sexual activity: No     Other Topics Concern    None     Social History Narrative     Visit Vitals    /66    Pulse 78    Temp 98.3 °F (36.8 °C) (Oral)    Resp 14    Ht 5' 4\" (1.626 m)    Wt 224 lb (101.6 kg)    SpO2 98%    BMI 38.45 kg/m2     Ear canals reveal minimal wax and normal-appearing tympanic membranes with good light reflex bilaterally. At the top of the right ear externally there is a white patch which is been unchanged for many years and is itchy. She has not tried any medication for this. Oral cavity reveals no lesions. Neck reveals no adenopathy or thyromegaly. Carotids are 2+ without bruits. Lungs are clear to percussion. Good breath sounds with no wheezing or crackles. Heart reveals a regular rhythm with normal S1 and S2 no murmur gallop click or rub. Apical impulse is not palpable. Abdomen is soft and nontender with no hepatosplenomegaly or masses and no bruits. Extremities reveal no clubbing cyanosis or edema. Pulses are 2+ except 1+ dorsalis pedis pulses. Breasts reveal no masses no skin or nipple abnormalities and no axillary adenopathy. Results for orders placed or performed during the hospital encounter of 05/29/18   CBC WITH AUTOMATED DIFF   Result Value Ref Range    WBC 6.5 4.6 - 13.2 K/uL    RBC 4.49 4.20 - 5.30 M/uL    HGB 13.6 12.0 - 16.0 g/dL    HCT 42.9 35.0 - 45.0 %    MCV 95.5 74.0 - 97.0 FL    MCH 30.3 24.0 - 34.0 PG    MCHC 31.7 31.0 - 37.0 g/dL    RDW 13.1 11.6 - 14.5 %    PLATELET 198 067 - 105 K/uL    MPV 9.9 9.2 - 11.8 FL    NEUTROPHILS 55 40 - 73 %    LYMPHOCYTES 34 21 - 52 %    MONOCYTES 8 3 - 10 %    EOSINOPHILS 2 0 - 5 %    BASOPHILS 1 0 - 2 %    ABS. NEUTROPHILS 3.6 1.8 - 8.0 K/UL    ABS. LYMPHOCYTES 2.2 0.9 - 3.6 K/UL    ABS.  MONOCYTES 0.5 0.05 - 1.2 K/UL ABS. EOSINOPHILS 0.1 0.0 - 0.4 K/UL    ABS. BASOPHILS 0.1 (H) 0.0 - 0.06 K/UL    DF AUTOMATED     T4, FREE   Result Value Ref Range    T4, Free 0.9 0.7 - 1.5 NG/DL   LIPID PANEL   Result Value Ref Range    LIPID PROFILE          Cholesterol, total 162 <200 MG/DL    Triglyceride 207 (H) <150 MG/DL    HDL Cholesterol 49 40 - 60 MG/DL    LDL, calculated 71.6 0 - 100 MG/DL    VLDL, calculated 41.4 MG/DL    CHOL/HDL Ratio 3.3 0 - 5.0     METABOLIC PANEL, COMPREHENSIVE   Result Value Ref Range    Sodium 141 136 - 145 mmol/L    Potassium 4.7 3.5 - 5.5 mmol/L    Chloride 105 100 - 108 mmol/L    CO2 30 21 - 32 mmol/L    Anion gap 6 3.0 - 18 mmol/L    Glucose 99 74 - 99 mg/dL    BUN 21 (H) 7.0 - 18 MG/DL    Creatinine 0.96 0.6 - 1.3 MG/DL    BUN/Creatinine ratio 22 (H) 12 - 20      GFR est AA >60 >60 ml/min/1.73m2    GFR est non-AA 57 (L) >60 ml/min/1.73m2    Calcium 9.7 8.5 - 10.1 MG/DL    Bilirubin, total 0.4 0.2 - 1.0 MG/DL    ALT (SGPT) 20 13 - 56 U/L    AST (SGOT) 13 (L) 15 - 37 U/L    Alk. phosphatase 111 45 - 117 U/L    Protein, total 7.0 6.4 - 8.2 g/dL    Albumin 3.9 3.4 - 5.0 g/dL    Globulin 3.1 2.0 - 4.0 g/dL    A-G Ratio 1.3 0.8 - 1.7     TSH 3RD GENERATION   Result Value Ref Range    TSH 1.01 0.36 - 3.74 uIU/mL   URINALYSIS W/ RFLX MICROSCOPIC   Result Value Ref Range    Color YELLOW      Appearance CLOUDY      Specific gravity 1.020 1.005 - 1.030      pH (UA) 6.5 5.0 - 8.0      Protein NEGATIVE  NEG mg/dL    Glucose NEGATIVE  NEG mg/dL    Ketone NEGATIVE  NEG mg/dL    Bilirubin NEGATIVE  NEG      Blood TRACE (A) NEG      Urobilinogen 1.0 0.2 - 1.0 EU/dL    Nitrites POSITIVE (A) NEG      Leukocyte Esterase LARGE (A) NEG     URINE MICROSCOPIC ONLY   Result Value Ref Range    WBC TOO NUMEROUS TO COUNT 0 - 4 /hpf    RBC 0 0 - 5 /hpf    Epithelial cells FEW 0 - 5 /lpf    Bacteria 4+ (A) NEG /hpf     Assessment: #1. Hyperlipidemia doing well.   She will continue low-fat diet and work on weight loss, she will continue simvastatin 20 mg each evening. #2.  Impaired fasting glucose improved, have encouraged her to keep her weight down, giving her a better chance to avoid diabetes. She understands and will work on further weight loss. #3.  Obesity improved by just 3 pounds from year ago, she is going to work on losing several more pounds over the next 6 months. #4.  Hypertension is controlled. She will continue losartan 25 mg daily and hydrochlorothiazide 25 mg daily. #5. Anxiety doing well, she will continue occasional diazepam 2 mg as needed. #6.  History of constipation doing well, she will continue MiraLAX. #7.  GERD asymptomatic, she will continue omeprazole 20 mg daily. #8.  Seborrheic keratosis of the right ear, she will try over-the-counter 1% hydrocortisone. Follow-up in 6 months with [facility] CATHLEEN Gtz MD FACP    Please note: This document has been produced using voice recognition software. Unrecognized errors in transcription may be present.

## 2018-06-05 NOTE — MR AVS SNAPSHOT
[address] [facility] 
 
 
 [address], [address] [facility] 
[phone] Patient: Dipti Buchanan MRN: [id number] UCP:0/18/5793 Visit Information Date & Time Provider Department Dept. Phone Encounter #  
 6/5/2018  1:00 PM Jean Marie Byrd MD [facility] [phone] [id number] Your Appointments 12/4/2018  8:25 AM  
LAB with C NURSE VISIT [facility] ([facility]) Appt Note: lab  
 [address], [address] [facility] [address] [place]  
  
   
 [address], [address]  
  
    
 12/11/2018 10:00 AM  
Office Visit with Jean Marie Byrd MD  
[facility] Appt Note: 6 month f/u  
 [address] [facility], [facility] [id number] [place] [address] [address] [place]  
  
   
 [address], [address] Upcoming Health Maintenance Date Due  
 MEDICARE YEARLY EXAM 3/28/2018 Influenza Age 5 to Adult 8/1/2018 BREAST CANCER SCRN MAMMOGRAM 1/12/2020 GLAUCOMA SCREENING Q2Y 6/4/2020 COLONOSCOPY 10/10/2023 DTaP/Tdap/Td series (2 - Td) 11/25/2026 Allergies as of 6/5/2018  Review Complete On: 6/5/2018 By: Jean Marie Byrd MD  
  
 Severity Noted Reaction Type Reactions Macrobid [Nitrofurantoin Monohyd/m-cryst] High 09/25/2015   Not Verified Anaphylaxis Aspirin  07/16/2015    Unknown (comments) Cannot take due to gastric bypass. Lisinopril  08/12/2014    Cough Parafon Forte Dsc [Chlorzoxazone]  10/20/2011   Side Effect Other (comments) Excessive drowsiness Current Immunizations  Reviewed on 8/12/2014 Name Date H1N1 FLU VACCINE 11/3/2009 Influenza High Dose Vaccine PF 10/15/2016, 10/14/2015, 10/7/2014 Influenza Vaccine PF 10/30/2013 Influenza Vaccine Split 11/6/2012, 11/9/2011 10:35 AM  
 Influenza Vaccine Whole 9/30/2010 Pneumococcal Conjugate (PCV-13) 8/17/2015  8:01 AM  
 Td 1/1/2007 Tdap 11/25/2016 ZZZ-RETIRED (DO NOT USE) Pneumococcal Vaccine (Unspecified Type) 9/30/2010 Zoster Vaccine, Live 1/1/2009 Not reviewed this visit You Were Diagnosed With   
  
 Codes Comments Routine general medical examination at a health care facility    -  Primary ICD-10-CM: Z00.00 ICD-9-CM: V70.0 Impaired fasting glucose     ICD-10-CM: R73.01 
ICD-9-CM: 790.21 Hyperlipidemia LDL goal <100     ICD-10-CM: E78.5 ICD-9-CM: 272.4 Primary hypertension     ICD-10-CM: I10 
ICD-9-CM: 401.9 Obesity (BMI 30-39. 9)     ICD-10-CM: E66.9 ICD-9-CM: 278.00 Vitals BP Pulse Temp Resp Height(growth percentile) Weight(growth percentile) 120/66 78 98.3 °F (36.8 °C) (Oral) 14 5' 4\" (1.626 m) 224 lb (101.6 kg) SpO2 BMI OB Status Smoking Status 98% 38.45 kg/m2 Hysterectomy Former Smoker Vitals History BMI and BSA Data Body Mass Index Body Surface Area  
 38.45 kg/m 2 2.14 m 2 Preferred Pharmacy Pharmacy Name Phone [address] [place] [phone] Your Updated Medication List  
  
   
This list is accurate as of 6/5/18  1:27 PM.  Always use your most recent med list.  
  
  
  
  
 albuterol 90 mcg/actuation inhaler Commonly known as:  PROVENTIL HFA, VENTOLIN HFA, PROAIR HFA Take 1-2 Puffs by inhalation every four (4) hours as needed for Wheezing. cranberry extract 450 mg Tab tablet Take  by mouth. cyclobenzaprine 10 mg tablet Commonly known as:  FLEXERIL Take 1 Tab by mouth three (3) times daily as needed for Muscle Spasm(s). diazePAM 2 mg tablet Commonly known as:  VALIUM Take 1 Tab by mouth every six (6) hours as needed for Anxiety. Max Daily Amount: 8 mg.  
  
 hydroCHLOROthiazide 25 mg tablet Commonly known as:  HYDRODIURIL Take 1 Tab by mouth daily. inhalational spacing device 1 Each by Does Not Apply route as needed. losartan 25 mg tablet Commonly known as:  COZAAR Take 1 Tab by mouth daily. MIRALAX PO Take  by mouth nightly. ondansetron hcl 4 mg tablet Commonly known as:  Marcela Million Take 1 Tab by mouth every eight (8) hours as needed. OS- + D PO Take 1 Cap by mouth two (2) times a day. PriLOSEC 20 mg capsule Generic drug:  omeprazole Take 20 mg by mouth daily. simvastatin 20 mg tablet Commonly known as:  ZOCOR  
TAKE ONE TABLET BY MOUTH IN THE EVENING  
  
 traMADol 50 mg tablet Commonly known as:  ULTRAM  
Take 1 Tab by mouth every six (6) hours as needed for Pain. VITAMIN B-12 500 mcg tablet Generic drug:  cyanocobalamin Take 500 mcg by mouth daily. VITAMIN D3 1,000 unit tablet Generic drug:  cholecalciferol Take 1,000 Units by mouth two (2) times a day. To-Do List   
 Around 12/12/2018 Lab:  LIPID PANEL Around 12/12/2018 Lab:  METABOLIC PANEL, COMPREHENSIVE Please provide this summary of care documentation to your next provider. Your primary care clinician is listed as Andrew Villanueva. Bernie Bustamante. If you have any questions after today's visit, please call [phone].

## 2018-06-28 ENCOUNTER — OFFICE VISIT (OUTPATIENT)
Dept: UROLOGY | Age: 73
End: 2018-06-28

## 2018-06-28 ENCOUNTER — HOSPITAL ENCOUNTER (OUTPATIENT)
Dept: LAB | Age: 73
Discharge: HOME | End: 2018-06-28
Payer: COMMERCIAL

## 2018-06-28 VITALS
HEART RATE: 73 BPM | BODY MASS INDEX: 38.24 KG/M2 | DIASTOLIC BLOOD PRESSURE: 62 MMHG | SYSTOLIC BLOOD PRESSURE: 133 MMHG | WEIGHT: 224 LBS | TEMPERATURE: 97.8 F | OXYGEN SATURATION: 96 % | HEIGHT: 64 IN

## 2018-06-28 DIAGNOSIS — R31.29 MICROSCOPIC HEMATURIA: ICD-10-CM

## 2018-06-28 DIAGNOSIS — R82.81 PYURIA: ICD-10-CM

## 2018-06-28 DIAGNOSIS — R35.0 FREQUENCY OF URINATION: Primary | ICD-10-CM

## 2018-06-28 DIAGNOSIS — N95.2 ATROPHIC VAGINITIS: ICD-10-CM

## 2018-06-28 PROBLEM — E66.01 SEVERE OBESITY (BMI 35.0-39.9): Status: ACTIVE | Noted: 2018-06-28

## 2018-06-28 LAB
BILIRUB UR QL STRIP: NEGATIVE
GLUCOSE UR-MCNC: NEGATIVE MG/DL
KETONES P FAST UR STRIP-MCNC: NEGATIVE MG/DL
PH UR STRIP: 6.5 [PH] (ref 4.6–8)
PROT UR QL STRIP: NEGATIVE
SP GR UR STRIP: 1.01 (ref 1–1.03)
UA UROBILINOGEN AMB POC: NORMAL (ref 0.2–1)
URINALYSIS CLARITY POC: NORMAL
URINALYSIS COLOR POC: YELLOW
URINE BLOOD POC: NORMAL
URINE LEUKOCYTES POC: NORMAL
URINE NITRITES POC: NEGATIVE

## 2018-06-28 PROCEDURE — 87077 CULTURE AEROBIC IDENTIFY: CPT | Performed by: UROLOGY

## 2018-06-28 PROCEDURE — 87086 URINE CULTURE/COLONY COUNT: CPT | Performed by: UROLOGY

## 2018-06-28 PROCEDURE — 87186 SC STD MICRODIL/AGAR DIL: CPT | Performed by: UROLOGY

## 2018-06-28 RX ORDER — ESTRADIOL 0.1 MG/G
CREAM VAGINAL
Qty: 42.5 G | Refills: 0 | Status: SHIPPED | OUTPATIENT
Start: 2018-06-28

## 2018-06-28 NOTE — MR AVS SNAPSHOT
[address] [place] [id number] 
[phone] Patient: Jamey Claudio MRN: [id number] XRD:4/11/9956 Visit Information Date & Time Provider Department Dept. Phone Encounter #  
 6/28/2018 11:00 AM Michelle Guido, [facility] [phone] [id number] Your Appointments 12/4/2018  8:25 AM  
LAB with IOC NURSE VISIT [facility] [address] [place] [address] ([facility]) Appt Note: lab  
 [address], [address] [facility] [address] [place]  
  
   
 [address], [address]  
  
    
 12/11/2018 10:00 AM  
Office Visit with Deangelo Lopez MD  
[facility] [address] [place] [address] Appt Note: 6 month f/u  
 [address] [facility], [address] Ursula Blanks [address] [place] [place]  
  
   
 [address], [address] [place] [address] Rd Upcoming Health Maintenance Date Due Influenza Age 5 to Adult 8/1/2018 BREAST CANCER SCRN MAMMOGRAM 1/12/2020 GLAUCOMA SCREENING Q2Y 6/4/2020 COLONOSCOPY 10/10/2023 DTaP/Tdap/Td series (2 - Td) 11/25/2026 Allergies as of 6/28/2018  Review Complete On: 6/28/2018 By: Roberto Braga Severity Noted Reaction Type Reactions Macrobid [Nitrofurantoin Monohyd/m-cryst] High 09/25/2015   Not Verified Anaphylaxis Aspirin  07/16/2015    Unknown (comments) Cannot take due to gastric bypass. Lisinopril  08/12/2014    Cough Parafon Forte Dsc [Chlorzoxazone]  10/20/2011   Side Effect Other (comments) Excessive drowsiness Current Immunizations  Reviewed on 8/12/2014 Name Date H1N1 FLU VACCINE 11/3/2009 Influenza High Dose Vaccine PF 10/15/2016, 10/14/2015, 10/7/2014 Influenza Vaccine PF 10/30/2013 Influenza Vaccine Split 11/6/2012, 11/9/2011 10:35 AM  
 Influenza Vaccine Whole 9/30/2010 Pneumococcal Conjugate (PCV-13) 8/17/2015  8:01 AM  
 Td 1/1/2007 Tdap 11/25/2016 ZZZ-RETIRED (DO NOT USE) Pneumococcal Vaccine (Unspecified Type) 9/30/2010 Zoster Vaccine, Live 1/1/2009 Not reviewed this visit You Were Diagnosed With   
  
 Codes Comments Frequency of urination    -  Primary ICD-10-CM: R35.0 ICD-9-CM: 788.41 Vitals BP Pulse Temp Height(growth percentile) Weight(growth percentile) SpO2  
 133/62 (BP 1 Location: Left arm, BP Patient Position: Sitting) 73 97.8 °F (36.6 °C) (Oral) 5' 4\" (1.626 m) 224 lb (101.6 kg) 96% BMI OB Status Smoking Status 38.45 kg/m2 Hysterectomy Former Smoker Vitals History BMI and BSA Data Body Mass Index Body Surface Area  
 38.45 kg/m 2 2.14 m 2 Preferred Pharmacy Pharmacy Name Phone [address] [facility] [address] [phone] Your Updated Medication List  
  
   
This list is accurate as of 6/28/18 11:36 AM.  Always use your most recent med list.  
  
  
  
  
 albuterol 90 mcg/actuation inhaler Commonly known as:  PROVENTIL HFA, VENTOLIN HFA, PROAIR HFA Take 1-2 Puffs by inhalation every four (4) hours as needed for Wheezing. cranberry extract 450 mg Tab tablet Take  by mouth. cyclobenzaprine 10 mg tablet Commonly known as:  FLEXERIL Take 1 Tab by mouth three (3) times daily as needed for Muscle Spasm(s). diazePAM 2 mg tablet Commonly known as:  VALIUM Take 1 Tab by mouth every six (6) hours as needed for Anxiety. Max Daily Amount: 8 mg.  
  
 hydroCHLOROthiazide 25 mg tablet Commonly known as:  HYDRODIURIL Take 1 Tab by mouth daily. inhalational spacing device 1 Each by Does Not Apply route as needed. losartan 25 mg tablet Commonly known as:  COZAAR Take 1 Tab by mouth daily. MIRALAX PO Take  by mouth nightly. ondansetron hcl 4 mg tablet Commonly known as:  Fredrich Cone Take 1 Tab by mouth every eight (8) hours as needed.   
  
 OS- + D PO  
 Take 1 Cap by mouth two (2) times a day. PriLOSEC 20 mg capsule Generic drug:  omeprazole Take 20 mg by mouth daily. simvastatin 20 mg tablet Commonly known as:  ZOCOR  
TAKE ONE TABLET BY MOUTH IN THE EVENING  
  
 traMADol 50 mg tablet Commonly known as:  ULTRAM  
Take 1 Tab by mouth every six (6) hours as needed for Pain. VITAMIN B-12 500 mcg tablet Generic drug:  cyanocobalamin Take 500 mcg by mouth daily. VITAMIN D3 1,000 unit tablet Generic drug:  cholecalciferol Take 1,000 Units by mouth two (2) times a day. We Performed the Following AMB POC URINALYSIS DIP STICK AUTO W/O MICRO [70811 CPT(R)] HI ROGER,POST-VOID RES,US,NON-IMAGING [id number] CPT(R)] Patient Instructions Painful Urination (Dysuria): Care Instructions Your Care Instructions Burning pain with urination (dysuria) is a common symptom of a urinary tract infection or other urinary problems. The bladder may become inflamed. This can cause pain when the bladder fills and empties. You may also feel pain if the tube that carries urine from the bladder to the outside of the body (urethra) gets irritated or infected. Sexually transmitted infections (STIs) also may cause pain when you urinate. Sometimes the pain can be caused by things other than an infection. The urethra can be irritated by soaps, perfumes, or foreign objects in the urethra. Kidney stones can cause pain when they pass through the urethra. The cause may be hard to find. You may need tests. Treatment for painful urination depends on the cause. Follow-up care is a key part of your treatment and safety. Be sure to make and go to all appointments, and call your doctor if you are having problems. It's also a good idea to know your test results and keep a list of the medicines you take. How can you care for yourself at home? · Drink extra water for the next day or two.  This will help make the urine less concentrated. (If you have kidney, heart, or liver disease and have to limit fluids, talk with your doctor before you increase the amount of fluids you drink.) · Avoid drinks that are carbonated or have caffeine. They can irritate the bladder. · Urinate often. Try to empty your bladder each time. For women: · Urinate right after you have sex. · After going to the bathroom, wipe from front to back. · Avoid douches, bubble baths, and feminine hygiene sprays. And avoid other feminine hygiene products that have deodorants. When should you call for help? Call your doctor now or seek immediate medical care if: 
? · You have new symptoms, such as fever, nausea, or vomiting. ? · You have new or worse symptoms of a urinary problem. For example: ¨ You have blood or pus in your urine. ¨ You have chills or body aches. ¨ It hurts worse to urinate. ¨ You have groin or belly pain. ¨ You have pain in your back just below your rib cage (the flank area). ? Watch closely for changes in your health, and be sure to contact your doctor if you have any problems. Where can you learn more? Go to http://josé-efren.info/. Enter G378 in the search box to learn more about \"Painful Urination (Dysuria): Care Instructions. \" Current as of: May 12, 2017 Content Version: 11.4 © 0734-7486 [employer]. Care instructions adapted under license by [employer] (which disclaims liability or warranty for this information). If you have questions about a medical condition or this instruction, always ask your healthcare professional. [place] [address] any warranty or liability for your use of this information. Frequent Urination: Care Instructions Your Care Instructions An urge to urinate frequently but usually passing only small amounts of urine is a common symptom of urinary problems, such as urinary tract infections. The bladder may become inflamed. This can cause the urge to urinate. You may try to urinate more often than usual to try to soothe that urge. Frequent urination also may be caused by sexually transmitted infections (STIs) or kidney stones. Or it may happen when something irritates the tube that carries urine from the bladder to the outside of the body (urethra). It may also be a sign of diabetes. The cause may be hard to find. You may need tests. Follow-up care is a key part of your treatment and safety. Be sure to make and go to all appointments, and call your doctor if you are having problems. It's also a good idea to know your test results and keep a list of the medicines you take. How can you care for yourself at home? · Drink extra water for the next day or two. This will help make the urine less concentrated. (If you have kidney, heart, or liver disease and have to limit fluids, talk with your doctor before you increase the amount of fluids you drink.) · Avoid drinks that are carbonated or have caffeine. They can irritate the bladder. For women: · Urinate right after you have sex. · After you go to the bathroom, wipe from front to back. · Avoid douches, bubble baths, and feminine hygiene sprays. And avoid other feminine hygiene products that have deodorants. When should you call for help? Call your doctor now or seek immediate medical care if: 
? · You have new symptoms, such as fever, nausea, or vomiting. ? · You have new or worse symptoms of a urinary problem. For example: ¨ You have blood or pus in your urine. ¨ You have chills or body aches. ¨ It hurts to urinate. ¨ You have groin or belly pain. ¨ You have pain in your back just below your rib cage (the flank area). ? Watch closely for changes in your health, and be sure to contact your doctor if you feel thirstier than usual. 
Where can you learn more? Go to http://stevie.info/. Enter 790 7476 in the search box to learn more about \"Frequent Urination: Care Instructions. \" Current as of: May 12, 2017 Content Version: 11.4 © 6027-7084 [employer]. Care instructions adapted under license by [employer] (which disclaims liability or warranty for this information). If you have questions about a medical condition or this instruction, always ask your healthcare professional. [place] [address] any warranty or liability for your use of this information. Please provide this summary of care documentation to your next provider. Your primary care clinician is listed as Terrence Horton. David Jackson. If you have any questions after today's visit, please call [phone].

## 2018-06-28 NOTE — PROGRESS NOTES
RBV Per Dr. Shelia Reynaga ER 25 mg one daily #30 with no refills sent to pharmacy. RBV Per Dr. Patricia Dias Estrace Cream 0.1%/gram  Apply 0.5 grams with fingertip to urethral opening twice weekly. RBV. Per Dr. Patricia Dias Patient to have urine sent for culture.

## 2018-06-28 NOTE — PROGRESS NOTES
Ms. Mario Gotti has a reminder for a \"due or due soon\" health maintenance. I have asked that she contact her primary care provider for follow-up on this health maintenance.

## 2018-06-28 NOTE — PATIENT INSTRUCTIONS
Painful Urination (Dysuria): Care Instructions  Your Care Instructions  Burning pain with urination (dysuria) is a common symptom of a urinary tract infection or other urinary problems. The bladder may become inflamed. This can cause pain when the bladder fills and empties. You may also feel pain if the tube that carries urine from the bladder to the outside of the body (urethra) gets irritated or infected. Sexually transmitted infections (STIs) also may cause pain when you urinate. Sometimes the pain can be caused by things other than an infection. The urethra can be irritated by soaps, perfumes, or foreign objects in the urethra. Kidney stones can cause pain when they pass through the urethra. The cause may be hard to find. You may need tests. Treatment for painful urination depends on the cause. Follow-up care is a key part of your treatment and safety. Be sure to make and go to all appointments, and call your doctor if you are having problems. It's also a good idea to know your test results and keep a list of the medicines you take. How can you care for yourself at home? · Drink extra water for the next day or two. This will help make the urine less concentrated. (If you have kidney, heart, or liver disease and have to limit fluids, talk with your doctor before you increase the amount of fluids you drink.)  · Avoid drinks that are carbonated or have caffeine. They can irritate the bladder. · Urinate often. Try to empty your bladder each time. For women:  · Urinate right after you have sex. · After going to the bathroom, wipe from front to back. · Avoid douches, bubble baths, and feminine hygiene sprays. And avoid other feminine hygiene products that have deodorants. When should you call for help? Call your doctor now or seek immediate medical care if:  ? · You have new symptoms, such as fever, nausea, or vomiting. ? · You have new or worse symptoms of a urinary problem.  For example:  Wesly Roca have blood or pus in your urine. ¨ You have chills or body aches. ¨ It hurts worse to urinate. ¨ You have groin or belly pain. ¨ You have pain in your back just below your rib cage (the flank area). ? Watch closely for changes in your health, and be sure to contact your doctor if you have any problems. Where can you learn more? Go to http://josé-efren.info/. Enter L333 in the search box to learn more about \"Painful Urination (Dysuria): Care Instructions. \"  Current as of: May 12, 2017  Content Version: 11.4  © 0861-7770 [employer]. Care instructions adapted under license by [employer] (which disclaims liability or warranty for this information). If you have questions about a medical condition or this instruction, always ask your healthcare professional. [place] [address] any warranty or liability for your use of this information. Frequent Urination: Care Instructions  Your Care Instructions  An urge to urinate frequently but usually passing only small amounts of urine is a common symptom of urinary problems, such as urinary tract infections. The bladder may become inflamed. This can cause the urge to urinate. You may try to urinate more often than usual to try to soothe that urge. Frequent urination also may be caused by sexually transmitted infections (STIs) or kidney stones. Or it may happen when something irritates the tube that carries urine from the bladder to the outside of the body (urethra). It may also be a sign of diabetes. The cause may be hard to find. You may need tests. Follow-up care is a key part of your treatment and safety. Be sure to make and go to all appointments, and call your doctor if you are having problems. It's also a good idea to know your test results and keep a list of the medicines you take. How can you care for yourself at home? · Drink extra water for the next day or two.  This will help make the urine less concentrated. (If you have kidney, heart, or liver disease and have to limit fluids, talk with your doctor before you increase the amount of fluids you drink.)  · Avoid drinks that are carbonated or have caffeine. They can irritate the bladder. For women:  · Urinate right after you have sex. · After you go to the bathroom, wipe from front to back. · Avoid douches, bubble baths, and feminine hygiene sprays. And avoid other feminine hygiene products that have deodorants. When should you call for help? Call your doctor now or seek immediate medical care if:  ? · You have new symptoms, such as fever, nausea, or vomiting. ? · You have new or worse symptoms of a urinary problem. For example:  ¨ You have blood or pus in your urine. ¨ You have chills or body aches. ¨ It hurts to urinate. ¨ You have groin or belly pain. ¨ You have pain in your back just below your rib cage (the flank area). ? Watch closely for changes in your health, and be sure to contact your doctor if you feel thirstier than usual.  Where can you learn more? Go to http://josé-efren.info/. Enter 643 [id number] in the search box to learn more about \"Frequent Urination: Care Instructions. \"  Current as of: May 12, 2017  Content Version: 11.4  © 5084-9938 [employer]. Care instructions adapted under license by [employer] (which disclaims liability or warranty for this information). If you have questions about a medical condition or this instruction, always ask your healthcare professional. [place] [address] any warranty or liability for your use of this information.

## 2018-06-28 NOTE — PROGRESS NOTES
Chief Complaint   Patient presents with    Urinary Frequency       HISTORY OF PRESENT ILLNESS:  Vy Walter is a 67 y.o. female who comes back into the office today with ongoing episodes of urinary frequency and urgency. She had been tried on an anticholinergic previously which apparently did not work well and 1 of her friends had tried Myrbetriq and so she came in with a card about that and wants to try that drug for her frequency. Urinalysis in her today is positive for leukocytes but is nitrite negative.,  Nevertheless I am going to go ahead and do a culture but not treat her for an infection until the culture is back. We then talked at length about the positive effects of Estrace vaginal cream and I am going to write her a prescription for that also. Apparently that was recommended previously and she just never got a prescription and had it filled. Past Medical History:   Diagnosis Date    Asthma with COPD ([address]     Bilateral pulmonary embolism ([facility] [address] 09/2015    Unprovoked. Negative hypercoaguable workup. Completed 6 months of Xarelto.  Cervical spondylosis     Colon adenoma     CTS (carpal tunnel syndrome)     Cystitis cystica 02/2016    Intermittent symptomatic UTIs. Evaluation by Dr. Bernard Mariee. Negative abdom. CT scan and renal ultrasound. Cystoscopy showing diffuse cystitis cystica.  Family history of colon cancer     GERD (gastroesophageal reflux disease)     Hypertension     Labyrinthitis     Left sided sciatica     Lumbar spondylosis     Microhematuria     Multiple pulmonary nodules determined by computed tomography of lung 03/2016    Bilateral. 3-5 mm in size. Dr. Wendy Nieto.  Osteopenia     Prediabetes     Status post gastric bypass for obesity     Thyroid nodule 11/2015    Thyroid ultrasound with dominant 2.7 cm solid nodule mid-lower left lobe. Dr. Naty Manley. FNA with benign cytology.         Past Surgical History:   Procedure Laterality Date    APPENDECTOMY      HX APPENDECTOMY      HX CARPAL TUNNEL RELEASE      twice on left, once on right    HX CHOLECYSTECTOMY      HX GASTRIC BYPASS      HX GASTRIC BYPASS      HX HEMORRHOIDECTOMY      HX HERNIA REPAIR      HX HYSTERECTOMY      HX SHOULDER ARTHROSCOPY Left     LAP,CHOLECYSTECTOMY         Social History   Substance Use Topics    Smoking status: Former Smoker     Packs/day: 3.00     Years: 20.00     Types: Cigarettes     Start date: 9/16/1963     Quit date: 1/5/1983    Smokeless tobacco: Never Used    Alcohol use No       Allergies   Allergen Reactions    Macrobid [Nitrofurantoin Monohyd/M-Cryst] Anaphylaxis    Aspirin Unknown (comments)     Cannot take due to gastric bypass.  Lisinopril Cough    Parafon Forte Dsc [Chlorzoxazone] Other (comments)     Excessive drowsiness       Family History   Problem Relation Age of Onset    Other Mother      Vascular disease    Cancer Mother     Colon Cancer Father     Cancer Father     Other Sister      Gout    Hypertension Sister     Diabetes Brother     Breast Cancer Other      Grandmother    Breast Cancer Maternal Grandmother        Current Outpatient Prescriptions   Medication Sig Dispense Refill    mirabegron ER (MYRBETRIQ) 25 mg ER tablet Take 1 Tab by mouth daily. 30 Tab 0    estradiol (ESTRACE) 0.01 % (0.1 mg/gram) vaginal cream Apply 0.5 grams with fingertip to urethral opening twice weekly. 42.5 g 0    losartan (COZAAR) 25 mg tablet Take 1 Tab by mouth daily. 90 Tab 3    diazePAM (VALIUM) 2 mg tablet Take 1 Tab by mouth every six (6) hours as needed for Anxiety. Max Daily Amount: 8 mg. 30 Tab 0    cyclobenzaprine (FLEXERIL) 10 mg tablet Take 1 Tab by mouth three (3) times daily as needed for Muscle Spasm(s). 30 Tab 0    hydroCHLOROthiazide (HYDRODIURIL) 25 mg tablet Take 1 Tab by mouth daily. 90 Tab 1    traMADol (ULTRAM) 50 mg tablet Take 1 Tab by mouth every six (6) hours as needed for Pain.  50 Tab 0    ondansetron hcl (ZOFRAN) 4 mg tablet Take 1 Tab by mouth every eight (8) hours as needed. 40 Tab 1    simvastatin (ZOCOR) 20 mg tablet TAKE ONE TABLET BY MOUTH IN THE EVENING 90 Tab 3    cranberry extract 450 mg tab Take  by mouth.  albuterol (PROVENTIL HFA, VENTOLIN HFA, PROAIR HFA) 90 mcg/actuation inhaler Take 1-2 Puffs by inhalation every four (4) hours as needed for Wheezing. 1 Inhaler 3    cholecalciferol, vitamin d3, (VITAMIN D) 1,000 unit tablet Take 1,000 Units by mouth two (2) times a day.  cyanocobalamin (VITAMIN B-12) 500 mcg tablet Take 500 mcg by mouth daily.  CALCIUM CARBONATE/VITAMIN D3 (OS- + D PO) Take 1 Cap by mouth two (2) times a day.  POLYETHYLENE GLYCOL 3350 (MIRALAX PO) Take  by mouth nightly.  omeprazole (PRILOSEC) 20 mg capsule Take 20 mg by mouth daily.  inhalational spacing device 1 Each by Does Not Apply route as needed. 1 Device 0           REVIEW OF SYSTEMS:   Documented on the chart      PHYSICAL EXAMINATION:     Visit Vitals    /62 (BP 1 Location: Left arm, BP Patient Position: Sitting)    Pulse 73    Temp 97.8 °F (36.6 °C) (Oral)    Ht 5' 4\" (1.626 m)    Wt 224 lb (101.6 kg)    SpO2 96%    BMI 38.45 kg/m2     Constitutional: Well developed, well-nourished female in no acute distress. CV:  No peripheral swelling noted  Respiratory: No respiratory distress or difficulties  Abdomen:  Soft and nontender. No masses. No hepatosplenomegaly.  Female:  No CVA tenderness. Skin:  Normal color. No evidence of jaundice. Neuro/Psych:  Patient with appropriate affect. Alert and oriented. Lymphatic:   No enlargement of supraclavicular lymph nodes.       Results for orders placed or performed in visit on 06/28/18   AMB POC URINALYSIS DIP STICK AUTO W/O MICRO   Result Value Ref Range    Color (UA POC) Yellow     Clarity (UA POC) Cloudy     Glucose (UA POC) Negative Negative    Bilirubin (UA POC) Negative Negative    Ketones (UA POC) Negative Negative    Specific gravity (UA POC) 1.015 1.001 - 1.035    Blood (UA POC) 1+ Negative    pH (UA POC) 6.5 4.6 - 8.0    Protein (UA POC) Negative Negative    Urobilinogen (UA POC) 0.2 mg/dL 0.2 - 1    Nitrites (UA POC) Negative Negative    Leukocyte esterase (UA POC) 2+ Negative     A bladder scan today shows 75 cc of post void residual.    REVIEW OF LABS AND IMAGING:      Imaging Report Reviewed? YES      Images Reviewed? YES           Other Lab Data Reviewed? YES    ASSESSMENT:     ICD-10-CM ICD-9-CM    1. Frequency of urination R35.0 788.41 AMB POC URINALYSIS DIP STICK AUTO W/O MICRO      AR ROGER,POST-VOID RES,US,NON-IMAGING      mirabegron ER (MYRBETRIQ) 25 mg ER tablet   2. Pyuria N39.0 791.9 CULTURE, URINE   3. Microscopic hematuria R31.29 599.72 CULTURE, URINE   4. Atrophic vaginitis N95.2 627.3 estradiol (ESTRACE) 0.01 % (0.1 mg/gram) vaginal cream                PLAN/DISCUSSION: A culture is taken today to make sure an infection is or is not positive. I am going to go ahead and start her on her Estrace vaginal cream and Myrbetriq. Patient voices understanding and agreement to the plan. Bernadette Lee MD on 6/28/2018           Please note: This document has been produced using voice recognition software. Unrecognized errors in transcription may be present.

## 2018-06-30 LAB
BACTERIA SPEC CULT: ABNORMAL
SERVICE CMNT-IMP: ABNORMAL

## 2018-07-02 NOTE — PROGRESS NOTES
She came by the office today to check on her culture and basically she is having some good response to the Myrbetriq but it is quite expensive. She is not having to get up as much at night and I think she probably simply has colonization of the bladder based on the low colony count. At this time, I am not going to put her on any antibiotics but I will continue the Estrace cream as well as the Myrbetriq and see her back in a month and at that time we can review the different types of anticholinergics.

## 2018-07-30 ENCOUNTER — OFFICE VISIT (OUTPATIENT)
Dept: UROLOGY | Age: 73
End: 2018-07-30

## 2018-07-30 VITALS
HEIGHT: 64 IN | OXYGEN SATURATION: 97 % | BODY MASS INDEX: 38.24 KG/M2 | SYSTOLIC BLOOD PRESSURE: 134 MMHG | DIASTOLIC BLOOD PRESSURE: 58 MMHG | HEART RATE: 80 BPM | WEIGHT: 224 LBS

## 2018-07-30 DIAGNOSIS — N95.2 ATROPHIC VAGINITIS: ICD-10-CM

## 2018-07-30 DIAGNOSIS — R35.0 URINE FREQUENCY: Primary | ICD-10-CM

## 2018-07-30 LAB
BILIRUB UR QL STRIP: NEGATIVE
GLUCOSE UR-MCNC: NEGATIVE MG/DL
KETONES P FAST UR STRIP-MCNC: NEGATIVE MG/DL
PH UR STRIP: 5.5 [PH] (ref 4.6–8)
PROT UR QL STRIP: NEGATIVE
SP GR UR STRIP: 1 (ref 1–1.03)
UA UROBILINOGEN AMB POC: NORMAL (ref 0.2–1)
URINALYSIS CLARITY POC: CLEAR
URINALYSIS COLOR POC: YELLOW
URINE BLOOD POC: NORMAL
URINE LEUKOCYTES POC: NORMAL
URINE NITRITES POC: NEGATIVE

## 2018-07-30 NOTE — PROGRESS NOTES
Ms. Guzman Kamara has a reminder for a \"due or due soon\" health maintenance. I have asked that she contact her primary care provider for follow-up on this health maintenance.

## 2018-07-30 NOTE — PATIENT INSTRUCTIONS
Urine Test: About This Test  What is it? A urine test checks the color, clarity (clear or cloudy), odor, concentration, and acidity (pH) of your urine. It also checks your levels of protein, sugar, blood cells, or other substances in your urine. This test is sometimes called a urinalysis. Why is this test done? A urine test may be done:  · To check for a disease or infection of the urinary tract. The urinary tract includes the kidneys, the tubes that carry urine from the kidneys to the bladder (ureters), and the bladder. It also includes the tube that carries urine from the bladder to outside the body (urethra). · To check the treatment of conditions such as diabetes, kidney stones, a urinary tract infection (UTI), high blood pressure, or some kidney or liver diseases. How can you prepare for the test?  · Before the test, don't eat foods that can change the color of your urine. Examples of these include blackberries, beets, and rhubarb. · Don't do heavy exercise before the test.  · Tell your doctor if you are menstruating or close to starting your period. Your doctor may want to wait to do the test.  · Tell your doctor about all the nonprescription and prescription medicines and herbs or other supplements you take. Some of these can affect the results of this test.  What happens during the test?  A urine test can be done in your doctor's office, clinic, or lab. Or you may be asked to collect a urine sample at home. Then you can take it to the office or lab for testing. Clean-catch midstream urine collection  · Wash your hands before you start. · If the cup you are given has a lid, remove it carefully. Set it down with the inner surface up. Don't touch the inside of the cup with your fingers. · Clean the area around your genitals. ¨ For men: Pull back the foreskin, if present. Clean the head of your penis with medicated towelettes or swabs.   ¨ For women: Spread open the genital folds of skin with one hand. Then use medicated towelettes or swabs in your other hand to clean the area where urine comes out (the urethra). Wipe the area from front to back. · Start urinating into the toilet or urinal. A woman should hold apart the genital folds of skin while she urinates. · After the urine has flowed for several seconds, place the cup into the urine stream. Collect about 2 ounces of urine without stopping your flow of urine. · Don't touch the rim of the cup to your genital area. Don't get toilet paper, pubic hair, stool (feces), menstrual blood, or anything else in the urine sample. · Finish urinating into the toilet or urinal.  · Carefully replace and tighten the lid on the cup, and then return it to the lab. If you are collecting the urine at home and can't get it to the lab in an hour, refrigerate it. Double-voided urine sample collection  This method collects the urine your body is making right now. · Urinate into the toilet or urinal. Don't collect any of this urine. · Drink a large glass of water, and wait about 30 to 40 minutes. · Then get a urine sample. Follow the instructions above for collecting a clean-catch urine sample. · Take the urine sample to the lab. If you are collecting the urine at home and can't get it to the lab in an hour, refrigerate it. Follow-up care is a key part of your treatment and safety. Be sure to make and go to all appointments, and call your doctor if you are having problems. It's also a good idea to keep a list of the medicines you take. Ask your doctor when you can expect to have your test results. Where can you learn more? Go to http://josé-efren.info/. Enter R266 in the search box to learn more about \"Urine Test: About This Test.\"  Current as of: October 9, 2017  Content Version: 11.7  © 9968-9077 [employer].  Care instructions adapted under license by [employer] (which disclaims liability or warranty for this information). If you have questions about a medical condition or this instruction, always ask your healthcare professional. [place] [address] any warranty or liability for your use of this information.

## 2018-07-30 NOTE — PROGRESS NOTES
Chief Complaint   Patient presents with    Urinary Frequency    Microscopic Hematuria       HISTORY OF PRESENT ILLNESS:  Sarmad Cobb is a 67 y.o. female returns to the office today after her trial of Myrbetriq. She says this really did not help much and as expensive visit is, she is not interested in continuing it. She did take the Estrace cream and currently is using that. Her urinary frequency is now down to about 2-3 times per night which is much improved from what it was last time. At this point in time I would recommend we just continue to follow her on the Estrace cream.  Dr. Rosmery Levin did a cystoscopy on her no longer than 2 years ago so I do not think that needs to be repeated now. She currently is happy the way she is. She does have some oxybutynin that was given to her  and wants to take that at bedtime so I said that would be fine. If no further problems I like to see her back in about 3 or 4 months and see how she is doing just with the Estrace cream.    Past Medical History:   Diagnosis Date    Asthma with COPD ([address]     Bilateral pulmonary embolism ([address] 09/2015    Unprovoked. Negative hypercoaguable workup. Completed 6 months of Xarelto.  Cervical spondylosis     Colon adenoma     CTS (carpal tunnel syndrome)     Cystitis cystica 02/2016    Intermittent symptomatic UTIs. Evaluation by Dr. Rosmery Levin. Negative abdom. CT scan and renal ultrasound. Cystoscopy showing diffuse cystitis cystica.  Family history of colon cancer     GERD (gastroesophageal reflux disease)     Hypertension     Labyrinthitis     Left sided sciatica     Lumbar spondylosis     Microhematuria     Multiple pulmonary nodules determined by computed tomography of lung 03/2016    Bilateral. 3-5 mm in size. Dr. Jessica Olmedo.  Osteopenia     Prediabetes     Status post gastric bypass for obesity     Thyroid nodule 11/2015    Thyroid ultrasound with dominant 2.7 cm solid nodule mid-lower left lobe. Dr. Shannan Benitez. FNA with benign cytology. Past Surgical History:   Procedure Laterality Date    APPENDECTOMY      HX APPENDECTOMY      HX CARPAL TUNNEL RELEASE      twice on left, once on right    HX CHOLECYSTECTOMY      HX GASTRIC BYPASS      HX GASTRIC BYPASS      HX HEMORRHOIDECTOMY      HX HERNIA REPAIR      HX HYSTERECTOMY      HX SHOULDER ARTHROSCOPY Left     LAP,CHOLECYSTECTOMY         Social History   Substance Use Topics    Smoking status: Former Smoker     Packs/day: 3.00     Years: 20.00     Types: Cigarettes     Start date: 9/16/1963     Quit date: 1/5/1983    Smokeless tobacco: Never Used    Alcohol use No       Allergies   Allergen Reactions    Macrobid [Nitrofurantoin Monohyd/M-Cryst] Anaphylaxis    Aspirin Unknown (comments)     Cannot take due to gastric bypass.  Lisinopril Cough    Parafon Forte Dsc [Chlorzoxazone] Other (comments)     Excessive drowsiness       Family History   Problem Relation Age of Onset    Other Mother      Vascular disease    Cancer Mother     Colon Cancer Father     Cancer Father     Other Sister      Gout    Hypertension Sister     Diabetes Brother     Breast Cancer Other      Grandmother    Breast Cancer Maternal Grandmother        Current Outpatient Prescriptions   Medication Sig Dispense Refill    estradiol (ESTRACE) 0.01 % (0.1 mg/gram) vaginal cream Apply 0.5 grams with fingertip to urethral opening twice weekly. 42.5 g 0    losartan (COZAAR) 25 mg tablet Take 1 Tab by mouth daily. 90 Tab 3    diazePAM (VALIUM) 2 mg tablet Take 1 Tab by mouth every six (6) hours as needed for Anxiety. Max Daily Amount: 8 mg. 30 Tab 0    cyclobenzaprine (FLEXERIL) 10 mg tablet Take 1 Tab by mouth three (3) times daily as needed for Muscle Spasm(s). 30 Tab 0    hydroCHLOROthiazide (HYDRODIURIL) 25 mg tablet Take 1 Tab by mouth daily. 90 Tab 1    traMADol (ULTRAM) 50 mg tablet Take 1 Tab by mouth every six (6) hours as needed for Pain.  50 Tab 0    simvastatin (ZOCOR) 20 mg tablet TAKE ONE TABLET BY MOUTH IN THE EVENING 90 Tab 3    cranberry extract 450 mg tab Take  by mouth.  albuterol (PROVENTIL HFA, VENTOLIN HFA, PROAIR HFA) 90 mcg/actuation inhaler Take 1-2 Puffs by inhalation every four (4) hours as needed for Wheezing. 1 Inhaler 3    cholecalciferol, vitamin d3, (VITAMIN D) 1,000 unit tablet Take 1,000 Units by mouth two (2) times a day.  cyanocobalamin (VITAMIN B-12) 500 mcg tablet Take 500 mcg by mouth daily.  CALCIUM CARBONATE/VITAMIN D3 (OS- + D PO) Take 1 Cap by mouth two (2) times a day.  POLYETHYLENE GLYCOL 3350 (MIRALAX PO) Take  by mouth nightly.  omeprazole (PRILOSEC) 20 mg capsule Take 20 mg by mouth daily.  mirabegron ER (MYRBETRIQ) 25 mg ER tablet Take 1 Tab by mouth daily. 30 Tab 0    ondansetron hcl (ZOFRAN) 4 mg tablet Take 1 Tab by mouth every eight (8) hours as needed. 40 Tab 1    inhalational spacing device 1 Each by Does Not Apply route as needed. 1 Device 0           REVIEW OF SYSTEMS:   Documented on the chart. PHYSICAL EXAMINATION:     Visit Vitals    /58 (BP 1 Location: Left arm, BP Patient Position: Sitting)    Pulse 80    Ht 5' 4\" (1.626 m)    Wt 224 lb (101.6 kg)    SpO2 97%    BMI 38.45 kg/m2     Constitutional: Well developed, well-nourished female in no acute distress. CV:  No peripheral swelling noted  Respiratory: No respiratory distress or difficulties  Abdomen:  Soft and nontender. No masses. No hepatosplenomegaly.  Female:  No CVA tenderness. Skin:  Normal color. No evidence of jaundice. Neuro/Psych:  Patient with appropriate affect. Alert and oriented. Lymphatic:   No enlargement of supraclavicular lymph nodes.       Results for orders placed or performed in visit on 07/30/18   AMB POC URINALYSIS DIP STICK AUTO W/O MICRO   Result Value Ref Range    Color (UA POC) Yellow     Clarity (UA POC) Clear     Glucose (UA POC) Negative Negative Bilirubin (UA POC) Negative Negative    Ketones (UA POC) Negative Negative    Specific gravity (UA POC) 1.005 1.001 - 1.035    Blood (UA POC) 1+ Negative    pH (UA POC) 5.5 4.6 - 8.0    Protein (UA POC) Negative Negative    Urobilinogen (UA POC) 0.2 mg/dL 0.2 - 1    Nitrites (UA POC) Negative Negative    Leukocyte esterase (UA POC) 2+ Negative         REVIEW OF LABS AND IMAGING:      Imaging Report Reviewed? NO      Images Reviewed? NO           Other Lab Data Reviewed? YES    ASSESSMENT:     ICD-10-CM ICD-9-CM    1. Urine frequency R35.0 788.41 AMB POC URINALYSIS DIP STICK AUTO W/O MICRO   2. Atrophic vaginitis N95.2 627.3 AMB POC URINALYSIS DIP STICK AUTO W/O MICRO                PLAN/DISCUSSION:  her urinary frequency is down a good bed and I do not know whether that is the left over Myrbetriq or the Estrace cream.  At present, I would like to just continue the Estrace cream and see her back in about 3-4 months. Patient voices understanding and agreement to the plan. Kolton Bravo MD on 7/30/2018           Please note: This document has been produced using voice recognition software. Unrecognized errors in transcription may be present.

## 2018-10-01 RX ORDER — HYDROCHLOROTHIAZIDE 25 MG/1
25 TABLET ORAL DAILY
Qty: 90 TAB | Refills: 3 | Status: SHIPPED | OUTPATIENT
Start: 2018-10-01 | End: 2019-09-25 | Stop reason: SDUPTHER

## 2018-10-01 NOTE — TELEPHONE ENCOUNTER
Last Visit: 06/05/2018 with MD Connor Perez    Next Appointment: 12/11/2018 with MD Connor Perez   Previous Refill Encounters: 04/13/2018 per MD Connor Perez #90 with 1 refill     Requested Prescriptions     Pending Prescriptions Disp Refills    hydroCHLOROthiazide (HYDRODIURIL) 25 mg tablet 90 Tab 3     Sig: Take 1 Tab by mouth daily.

## 2018-10-31 ENCOUNTER — CLINICAL SUPPORT (OUTPATIENT)
Dept: INTERNAL MEDICINE CLINIC | Age: 73
End: 2018-10-31

## 2018-10-31 DIAGNOSIS — Z23 ENCOUNTER FOR IMMUNIZATION: ICD-10-CM

## 2018-10-31 NOTE — PROGRESS NOTES
Rommel Carrillo 1945 female who presents for routine immunizations. Patient denies any symptoms , reactions or allergies that would exclude them from being immunized today. Risks and adverse reactions were discussed and the VIS was given to them. All questions were addressed. Order placed for HD FLU,  per Verbal Order from DR. Dora Gonzalez with read back. Patient was observed for 15 min post injection. There were no reactions observed.     Jonatan Sellers LPN

## 2018-10-31 NOTE — PATIENT INSTRUCTIONS
Vaccine Information Statement    Influenza (Flu) Vaccine (Inactivated or Recombinant): What you need to know    Many Vaccine Information Statements are available in Persian and other languages. See www.immunize.org/vis  Hojas de Información Sobre Vacunas están disponibles en Español y en muchos otros idiomas. Visite www.immunize.org/vis    1. Why get vaccinated? Influenza (flu) is a contagious disease that spreads around the [place] every year, usually between October and May. Flu is caused by influenza viruses, and is spread mainly by coughing, sneezing, and close contact. Anyone can get flu. Flu strikes suddenly and can last several days. Symptoms vary by age, but can include:   fever/chills   sore throat   muscle aches   fatigue   cough   headache    runny or stuffy nose    Flu can also lead to pneumonia and blood infections, and cause diarrhea and seizures in children. If you have a medical condition, such as heart or lung disease, flu can make it worse. Flu is more dangerous for some people. Infants and young children, people 72years of age and older, pregnant women, and people with certain health conditions or a weakened immune system are at greatest risk. Each year thousands of people in the [facility] die from flu, and many more are hospitalized. Flu vaccine can:   keep you from getting flu,   make flu less severe if you do get it, and   keep you from spreading flu to your family and other people. 2. Inactivated and recombinant flu vaccines    A dose of flu vaccine is recommended every flu season. Children 6 months through 6years of age may need two doses during the same flu season. Everyone else needs only one dose each flu season.        Some inactivated flu vaccines contain a very small amount of a mercury-based preservative called thimerosal. Studies have not shown thimerosal in vaccines to be harmful, but flu vaccines that do not contain thimerosal are available. There is no live flu virus in flu shots. They cannot cause the flu. There are many flu viruses, and they are always changing. Each year a new flu vaccine is made to protect against three or four viruses that are likely to cause disease in the upcoming flu season. But even when the vaccine doesnt exactly match these viruses, it may still provide some protection    Flu vaccine cannot prevent:   flu that is caused by a virus not covered by the vaccine, or   illnesses that look like flu but are not. It takes about 2 weeks for protection to develop after vaccination, and protection lasts through the flu season. 3. Some people should not get this vaccine    Tell the person who is giving you the vaccine:     If you have any severe, life-threatening allergies. If you ever had a life-threatening allergic reaction after a dose of flu vaccine, or have a severe allergy to any part of this vaccine, you may be advised not to get vaccinated. Most, but not all, types of flu vaccine contain a small amount of egg protein.  If you ever had Guillain-Barré Syndrome (also called GBS). Some people with a history of GBS should not get this vaccine. This should be discussed with your doctor.  If you are not feeling well. It is usually okay to get flu vaccine when you have a mild illness, but you might be asked to come back when you feel better. 4. Risks of a vaccine reaction    With any medicine, including vaccines, there is a chance of reactions. These are usually mild and go away on their own, but serious reactions are also possible. Most people who get a flu shot do not have any problems with it.      Minor problems following a flu shot include:    soreness, redness, or swelling where the shot was given     hoarseness   sore, red or itchy eyes   cough   fever   aches   headache   itching   fatigue  If these problems occur, they usually begin soon after the shot and last 1 or 2 days. More serious problems following a flu shot can include the following:     There may be a small increased risk of Guillain-Barré Syndrome (GBS) after inactivated flu vaccine. This risk has been estimated at 1 or 2 additional cases per million people vaccinated. This is much lower than the risk of severe complications from flu, which can be prevented by flu vaccine.  Young children who get the flu shot along with pneumococcal vaccine (PCV13) and/or DTaP vaccine at the same time might be slightly more likely to have a seizure caused by fever. Ask your doctor for more information. Tell your doctor if a child who is getting flu vaccine has ever had a seizure. Problems that could happen after any injected vaccine:      People sometimes faint after a medical procedure, including vaccination. Sitting or lying down for about 15 minutes can help prevent fainting, and injuries caused by a fall. Tell your doctor if you feel dizzy, or have vision changes or ringing in the ears.  Some people get severe pain in the shoulder and have difficulty moving the arm where a shot was given. This happens very rarely.  Any medication can cause a severe allergic reaction. Such reactions from a vaccine are very rare, estimated at about 1 in a million doses, and would happen within a few minutes to a few hours after the vaccination. As with any medicine, there is a very remote chance of a vaccine causing a serious injury or death. The safety of vaccines is always being monitored. For more information, visit: www.cdc.gov/vaccinesafety/    5. What if there is a serious reaction? What should I look for?  Look for anything that concerns you, such as signs of a severe allergic reaction, very high fever, or unusual behavior.     Signs of a severe allergic reaction can include hives, swelling of the face and throat, difficulty breathing, a fast heartbeat, dizziness, and weakness - usually within a few minutes to a few hours after the vaccination. What should I do?  If you think it is a severe allergic reaction or other emergency that cant wait, call 9-1-1 and get the person to the nearest hospital. Otherwise, call your doctor.  Reactions should be reported to the Vaccine Adverse Event Reporting System (VAERS). Your doctor should file this report, or you can do it yourself through  the VAERS web site at www.vaers. [facility].gov, or by calling 6-742.392.7856. VAERS does not give medical advice. 6. The National Vaccine Injury Compensation Program    The [facility] Vaccine Injury Compensation Program (VICP) is a federal program that was created to compensate people who may have been injured by certain vaccines. Persons who believe they may have been injured by a vaccine can learn about the program and about filing a claim by calling 3-774.969.4976 or visiting the [employer] website at www.[facility].gov/vaccinecompensation. There is a time limit to file a claim for compensation. 7. How can I learn more?  Ask your healthcare provider. He or she can give you the vaccine package insert or suggest other sources of information.  Call your local or state health department.  Contact the Centers for Disease Control and Prevention (CDC):  - Call 1-552.498.6604 (1-800-CDC-INFO) or  - Visit CDCs website at www.cdc.gov/flu    Vaccine Information Statement   Inactivated Influenza Vaccine   8/7/2015  42 COURTNEY Rg [id number]    Department of Health and Human Services  Centers for Disease Control and Prevention    Office Use Only

## 2018-12-04 ENCOUNTER — APPOINTMENT (OUTPATIENT)
Dept: INTERNAL MEDICINE CLINIC | Age: 73
End: 2018-12-04

## 2018-12-04 ENCOUNTER — HOSPITAL ENCOUNTER (OUTPATIENT)
Dept: LAB | Age: 73
Discharge: HOME | End: 2018-12-04
Payer: COMMERCIAL

## 2018-12-04 DIAGNOSIS — E78.5 HYPERLIPIDEMIA LDL GOAL <100: ICD-10-CM

## 2018-12-04 LAB
ALBUMIN SERPL-MCNC: 3.8 G/DL (ref 3.4–5)
ALBUMIN/GLOB SERPL: 1.3 {RATIO} (ref 0.8–1.7)
ALP SERPL-CCNC: 123 U/L (ref 45–117)
ALT SERPL-CCNC: 18 U/L (ref 13–56)
ANION GAP SERPL CALC-SCNC: 10 MMOL/L (ref 3–18)
AST SERPL-CCNC: 12 U/L (ref 15–37)
BILIRUB SERPL-MCNC: 0.4 MG/DL (ref 0.2–1)
BUN SERPL-MCNC: 19 MG/DL (ref 7–18)
BUN/CREAT SERPL: 20 (ref 12–20)
CALCIUM SERPL-MCNC: 9.6 MG/DL (ref 8.5–10.1)
CHLORIDE SERPL-SCNC: 101 MMOL/L (ref 100–108)
CHOLEST SERPL-MCNC: 162 MG/DL
CO2 SERPL-SCNC: 29 MMOL/L (ref 21–32)
CREAT SERPL-MCNC: 0.96 MG/DL (ref 0.6–1.3)
GLOBULIN SER CALC-MCNC: 3 G/DL (ref 2–4)
GLUCOSE SERPL-MCNC: 104 MG/DL (ref 74–99)
HDLC SERPL-MCNC: 55 MG/DL (ref 40–60)
HDLC SERPL: 2.9 {RATIO} (ref 0–5)
LDLC SERPL CALC-MCNC: 67.6 MG/DL (ref 0–100)
LIPID PROFILE,FLP: ABNORMAL
POTASSIUM SERPL-SCNC: 4 MMOL/L (ref 3.5–5.5)
PROT SERPL-MCNC: 6.8 G/DL (ref 6.4–8.2)
SODIUM SERPL-SCNC: 140 MMOL/L (ref 136–145)
TRIGL SERPL-MCNC: 197 MG/DL (ref ?–150)
VLDLC SERPL CALC-MCNC: 39.4 MG/DL

## 2018-12-04 PROCEDURE — 36415 COLL VENOUS BLD VENIPUNCTURE: CPT

## 2018-12-04 PROCEDURE — 80061 LIPID PANEL: CPT

## 2018-12-04 PROCEDURE — 80053 COMPREHEN METABOLIC PANEL: CPT

## 2018-12-10 RX ORDER — SIMVASTATIN 20 MG/1
20 TABLET, FILM COATED ORAL EVERY EVENING
Qty: 90 TAB | Refills: 3 | Status: SHIPPED | OUTPATIENT
Start: 2018-12-10 | End: 2019-12-02 | Stop reason: SDUPTHER

## 2018-12-10 NOTE — TELEPHONE ENCOUNTER
Last Visit: 06/05/2018 with MD Nato Bashir  Next Appointment: 12/11/2018 with MD Nato Bashir  Previous Refill Encounter(s): 12/01/2017 per MD Nato Bashir #90 with 3 refills     Requested Prescriptions     Pending Prescriptions Disp Refills    simvastatin (ZOCOR) 20 mg tablet 90 Tab 3     Sig: Take 1 Tab by mouth every evening.

## 2018-12-11 ENCOUNTER — OFFICE VISIT (OUTPATIENT)
Dept: INTERNAL MEDICINE CLINIC | Age: 73
End: 2018-12-11

## 2018-12-11 VITALS
BODY MASS INDEX: 38.14 KG/M2 | WEIGHT: 223.4 LBS | TEMPERATURE: 98.1 F | HEART RATE: 71 BPM | OXYGEN SATURATION: 96 % | SYSTOLIC BLOOD PRESSURE: 136 MMHG | DIASTOLIC BLOOD PRESSURE: 64 MMHG | RESPIRATION RATE: 18 BRPM | HEIGHT: 64 IN

## 2018-12-11 DIAGNOSIS — E78.5 HYPERLIPIDEMIA LDL GOAL <100: ICD-10-CM

## 2018-12-11 DIAGNOSIS — I10 PRIMARY HYPERTENSION: Primary | ICD-10-CM

## 2018-12-11 DIAGNOSIS — E66.01 SEVERE OBESITY WITH BODY MASS INDEX (BMI) OF 35.0 TO 39.9 WITH SERIOUS COMORBIDITY (HCC): ICD-10-CM

## 2018-12-11 DIAGNOSIS — R73.01 IMPAIRED FASTING GLUCOSE: ICD-10-CM

## 2018-12-11 DIAGNOSIS — E04.1 THYROID NODULE: ICD-10-CM

## 2018-12-11 DIAGNOSIS — M54.31 SCIATICA OF RIGHT SIDE: ICD-10-CM

## 2018-12-11 NOTE — PROGRESS NOTES
1. Have you been to the ER, urgent care clinic or hospitalized since your last visit? NO           2. Have you seen or consulted any other health care providers outside of the [address] [place] [address] since your last visit (Include any pap smears or colon screening)? YES    Do you have an Advanced Directive? YES    Would you like information on Advanced Directives?  NO

## 2018-12-11 NOTE — PROGRESS NOTES
Magalie Boyce 1945, is a 68 y.o. female, who is seen today for reevaluation of hypertension obesity impaired fasting glucose hyperlipidemia thyroid nodule. She tells me that she is having more discomfort in the right buttock radiating down the anterior and lateral thigh to the knee and she was having 6 months ago, she did not mention it then. She has seen Dr. Hector Diaz in the past and did not really like that interaction, she was told that she had to levels of disc disease but that surgery would not help because it would move to other levels because of arthritis. She uses Tylenol and does not want to use anything stronger than that. She is a nodule in her posterior lateral left neck that is somewhat tender at times. She had gastric bypass surgery in the year 2000 and gained a lot of weight back and continues to try to keep her weight down. She takes her medicine correctly. Past Medical History:   Diagnosis Date    Asthma with COPD ([address]     Bilateral pulmonary embolism ([address] 09/2015    Unprovoked. Negative hypercoaguable workup. Completed 6 months of Xarelto.  Cervical spondylosis     Colon adenoma     CTS (carpal tunnel syndrome)     Cystitis cystica 02/2016    Intermittent symptomatic UTIs. Evaluation by Dr. Manjinder Smyth. Negative abdom. CT scan and renal ultrasound. Cystoscopy showing diffuse cystitis cystica.  Family history of colon cancer     GERD (gastroesophageal reflux disease)     Hypertension     Labyrinthitis     Left sided sciatica     Lumbar spondylosis     Microhematuria     Multiple pulmonary nodules determined by computed tomography of lung 03/2016    Bilateral. 3-5 mm in size. Dr. Pito Kenney.  Osteopenia     Prediabetes     Status post gastric bypass for obesity     Thyroid nodule 11/2015    Thyroid ultrasound with dominant 2.7 cm solid nodule mid-lower left lobe. Dr. Estrella Begun. FNA with benign cytology.       Current Outpatient Medications   Medication Sig Dispense Refill    simvastatin (ZOCOR) 20 mg tablet Take 1 Tab by mouth every evening. 90 Tab 3    hydroCHLOROthiazide (HYDRODIURIL) 25 mg tablet Take 1 Tab by mouth daily. 90 Tab 3    mirabegron ER (MYRBETRIQ) 25 mg ER tablet Take 1 Tab by mouth daily. 30 Tab 0    estradiol (ESTRACE) 0.01 % (0.1 mg/gram) vaginal cream Apply 0.5 grams with fingertip to urethral opening twice weekly. 42.5 g 0    losartan (COZAAR) 25 mg tablet Take 1 Tab by mouth daily. 90 Tab 3    diazePAM (VALIUM) 2 mg tablet Take 1 Tab by mouth every six (6) hours as needed for Anxiety. Max Daily Amount: 8 mg. 30 Tab 0    cyclobenzaprine (FLEXERIL) 10 mg tablet Take 1 Tab by mouth three (3) times daily as needed for Muscle Spasm(s). 30 Tab 0    ondansetron hcl (ZOFRAN) 4 mg tablet Take 1 Tab by mouth every eight (8) hours as needed. 40 Tab 1    cranberry extract 450 mg tab Take  by mouth.  albuterol (PROVENTIL HFA, VENTOLIN HFA, PROAIR HFA) 90 mcg/actuation inhaler Take 1-2 Puffs by inhalation every four (4) hours as needed for Wheezing. 1 Inhaler 3    inhalational spacing device 1 Each by Does Not Apply route as needed. 1 Device 0    cholecalciferol, vitamin d3, (VITAMIN D) 1,000 unit tablet Take 1,000 Units by mouth two (2) times a day.  cyanocobalamin (VITAMIN B-12) 500 mcg tablet Take 500 mcg by mouth daily.  CALCIUM CARBONATE/VITAMIN D3 (OS- + D PO) Take 1 Cap by mouth two (2) times a day.  POLYETHYLENE GLYCOL 3350 (MIRALAX PO) Take  by mouth nightly.  omeprazole (PRILOSEC) 20 mg capsule Take 20 mg by mouth daily. Visit Vitals  /64 (BP 1 Location: Right arm, BP Patient Position: Sitting)   Pulse 71   Temp 98.1 °F (36.7 °C) (Oral)   Resp 18   Ht 5' 4\" (1.626 m)   Wt 223 lb 6.4 oz (101.3 kg)   SpO2 96%   BMI 38.35 kg/m²     Carotids are 2+ without bruits. Neck reveals no adenopathy or thyromegaly and no nodularity. No tenderness. Lungs are clear to percussion.   Good breath sounds with no wheezing or crackles. Heart reveals a regular rhythm with normal S1 and S2 no murmur gallop click or rub. Apical impulse is not palpable. Abdomen is obese soft nontender with no obvious hepatosplenomegaly or masses and no bruits. Extremities reveal no clubbing cyanosis or edema. Abnormal straight leg raising on the right. Pulses are 2+. Results for orders placed or performed during the hospital encounter of 43/56/68   METABOLIC PANEL, COMPREHENSIVE   Result Value Ref Range    Sodium 140 136 - 145 mmol/L    Potassium 4.0 3.5 - 5.5 mmol/L    Chloride 101 100 - 108 mmol/L    CO2 29 21 - 32 mmol/L    Anion gap 10 3.0 - 18 mmol/L    Glucose 104 (H) 74 - 99 mg/dL    BUN 19 (H) 7.0 - 18 MG/DL    Creatinine 0.96 0.6 - 1.3 MG/DL    BUN/Creatinine ratio 20 12 - 20      GFR est AA >60 >60 ml/min/1.73m2    GFR est non-AA 57 (L) >60 ml/min/1.73m2    Calcium 9.6 8.5 - 10.1 MG/DL    Bilirubin, total 0.4 0.2 - 1.0 MG/DL    ALT (SGPT) 18 13 - 56 U/L    AST (SGOT) 12 (L) 15 - 37 U/L    Alk. phosphatase 123 (H) 45 - 117 U/L    Protein, total 6.8 6.4 - 8.2 g/dL    Albumin 3.8 3.4 - 5.0 g/dL    Globulin 3.0 2.0 - 4.0 g/dL    A-G Ratio 1.3 0.8 - 1.7     LIPID PANEL   Result Value Ref Range    LIPID PROFILE          Cholesterol, total 162 <200 MG/DL    Triglyceride 197 (H) <150 MG/DL    HDL Cholesterol 55 40 - 60 MG/DL    LDL, calculated 67.6 0 - 100 MG/DL    VLDL, calculated 39.4 MG/DL    CHOL/HDL Ratio 2.9 0 - 5.0       Assessment: #1. Probable sciatica, she will continue Tylenol but prefers not to see a physician for this, if she changes her mind I would recommend seeing Dr. Eloy Cordoba or Dr. Shobha Kimball. #2.  Extreme obesity status post gastric bypass surgery nearly 18 years ago, she will continue working on weight loss. #3. Hyperlipidemia doing well. She will continue simvastatin 20 mg each evening. #4.  Thyroid nodules, she has a follow-up appointment coming up with her endocrinologist soon.   #5.  Mild impaired fasting glucose, she will continue working on weight loss and we will check glucose in 6 months. #6. Hypertension is controlled, she will continue losartan 25 mg daily and hydrochlorthiazide 25 mg daily. #7. She has a nodule in her neck comes and goes and seems to be muscular. If this worsens she will let me know but I feel nothing there now. Follow-up in 6 months for complete evaluation or sooner if needed. She has an appointment for colonoscopy next month. Sam Dash MD FACP    Please note: This document has been produced using voice recognition software. Unrecognized errors in transcription may be present.

## 2019-01-25 ENCOUNTER — HOSPITAL ENCOUNTER (OUTPATIENT)
Dept: MAMMOGRAPHY | Age: 74
Discharge: HOME | End: 2019-01-25
Attending: INTERNAL MEDICINE
Payer: COMMERCIAL

## 2019-01-25 DIAGNOSIS — Z12.31 VISIT FOR SCREENING MAMMOGRAM: ICD-10-CM

## 2019-01-25 PROCEDURE — 77067 SCR MAMMO BI INCL CAD: CPT

## 2019-04-22 DIAGNOSIS — R05.3 PERSISTENT COUGH: ICD-10-CM

## 2019-04-22 RX ORDER — LOSARTAN POTASSIUM 25 MG/1
TABLET ORAL
Qty: 90 TAB | Refills: 1 | Status: SHIPPED | OUTPATIENT
Start: 2019-04-22 | End: 2019-10-17 | Stop reason: SDUPTHER

## 2019-06-03 ENCOUNTER — HOSPITAL ENCOUNTER (OUTPATIENT)
Dept: LAB | Age: 74
Discharge: HOME | End: 2019-06-03
Payer: COMMERCIAL

## 2019-06-03 ENCOUNTER — APPOINTMENT (OUTPATIENT)
Dept: INTERNAL MEDICINE CLINIC | Age: 74
End: 2019-06-03

## 2019-06-03 DIAGNOSIS — I10 PRIMARY HYPERTENSION: ICD-10-CM

## 2019-06-03 LAB
ALBUMIN SERPL-MCNC: 4 G/DL (ref 3.4–5)
ALBUMIN/GLOB SERPL: 1.4 {RATIO} (ref 0.8–1.7)
ALP SERPL-CCNC: 128 U/L (ref 45–117)
ALT SERPL-CCNC: 19 U/L (ref 13–56)
ANION GAP SERPL CALC-SCNC: 10 MMOL/L (ref 3–18)
AST SERPL-CCNC: 14 U/L (ref 15–37)
BASOPHILS # BLD: 0.1 K/UL (ref 0–0.1)
BASOPHILS NFR BLD: 1 % (ref 0–2)
BILIRUB SERPL-MCNC: 0.7 MG/DL (ref 0.2–1)
BUN SERPL-MCNC: 18 MG/DL (ref 7–18)
BUN/CREAT SERPL: 19 (ref 12–20)
CALCIUM SERPL-MCNC: 9.4 MG/DL (ref 8.5–10.1)
CHLORIDE SERPL-SCNC: 105 MMOL/L (ref 100–108)
CHOLEST SERPL-MCNC: 162 MG/DL
CO2 SERPL-SCNC: 26 MMOL/L (ref 21–32)
CREAT SERPL-MCNC: 0.95 MG/DL (ref 0.6–1.3)
DIFFERENTIAL METHOD BLD: NORMAL
EOSINOPHIL # BLD: 0.1 K/UL (ref 0–0.4)
EOSINOPHIL NFR BLD: 2 % (ref 0–5)
ERYTHROCYTE [DISTWIDTH] IN BLOOD BY AUTOMATED COUNT: 13.1 % (ref 11.6–14.5)
GLOBULIN SER CALC-MCNC: 2.9 G/DL (ref 2–4)
GLUCOSE SERPL-MCNC: 97 MG/DL (ref 74–99)
HCT VFR BLD AUTO: 41.6 % (ref 35–45)
HDLC SERPL-MCNC: 55 MG/DL (ref 40–60)
HDLC SERPL: 2.9 {RATIO} (ref 0–5)
HGB BLD-MCNC: 13.1 G/DL (ref 12–16)
LDLC SERPL CALC-MCNC: 61.6 MG/DL (ref 0–100)
LIPID PROFILE,FLP: ABNORMAL
LYMPHOCYTES # BLD: 1.9 K/UL (ref 0.9–3.6)
LYMPHOCYTES NFR BLD: 29 % (ref 21–52)
MCH RBC QN AUTO: 30 PG (ref 24–34)
MCHC RBC AUTO-ENTMCNC: 31.5 G/DL (ref 31–37)
MCV RBC AUTO: 95.4 FL (ref 74–97)
MONOCYTES # BLD: 0.5 K/UL (ref 0.05–1.2)
MONOCYTES NFR BLD: 8 % (ref 3–10)
NEUTS SEG # BLD: 4 K/UL (ref 1.8–8)
NEUTS SEG NFR BLD: 60 % (ref 40–73)
PLATELET # BLD AUTO: 270 K/UL (ref 135–420)
PMV BLD AUTO: 10.1 FL (ref 9.2–11.8)
POTASSIUM SERPL-SCNC: 4 MMOL/L (ref 3.5–5.5)
PROT SERPL-MCNC: 6.9 G/DL (ref 6.4–8.2)
RBC # BLD AUTO: 4.36 M/UL (ref 4.2–5.3)
SODIUM SERPL-SCNC: 141 MMOL/L (ref 136–145)
TRIGL SERPL-MCNC: 227 MG/DL (ref ?–150)
VLDLC SERPL CALC-MCNC: 45.4 MG/DL
WBC # BLD AUTO: 6.6 K/UL (ref 4.6–13.2)

## 2019-06-03 PROCEDURE — 36415 COLL VENOUS BLD VENIPUNCTURE: CPT

## 2019-06-03 PROCEDURE — 80053 COMPREHEN METABOLIC PANEL: CPT

## 2019-06-03 PROCEDURE — 85025 COMPLETE CBC W/AUTO DIFF WBC: CPT

## 2019-06-03 PROCEDURE — 80061 LIPID PANEL: CPT

## 2019-06-10 ENCOUNTER — OFFICE VISIT (OUTPATIENT)
Dept: INTERNAL MEDICINE CLINIC | Age: 74
End: 2019-06-10

## 2019-06-10 VITALS
HEART RATE: 75 BPM | DIASTOLIC BLOOD PRESSURE: 68 MMHG | HEIGHT: 64 IN | BODY MASS INDEX: 37.9 KG/M2 | OXYGEN SATURATION: 96 % | WEIGHT: 222 LBS | TEMPERATURE: 98.4 F | RESPIRATION RATE: 14 BRPM | SYSTOLIC BLOOD PRESSURE: 124 MMHG

## 2019-06-10 DIAGNOSIS — F32.1 CURRENT MODERATE EPISODE OF MAJOR DEPRESSIVE DISORDER WITHOUT PRIOR EPISODE (HCC): ICD-10-CM

## 2019-06-10 DIAGNOSIS — Z00.00 ROUTINE GENERAL MEDICAL EXAMINATION AT A HEALTH CARE FACILITY: Primary | ICD-10-CM

## 2019-06-10 DIAGNOSIS — E78.5 HYPERLIPIDEMIA LDL GOAL <100: ICD-10-CM

## 2019-06-10 DIAGNOSIS — E66.9 OBESITY (BMI 30-39.9): ICD-10-CM

## 2019-06-10 RX ORDER — ESCITALOPRAM OXALATE 10 MG/1
10 TABLET ORAL DAILY
Qty: 30 TAB | Refills: 1 | Status: SHIPPED | OUTPATIENT
Start: 2019-06-10 | End: 2019-07-22 | Stop reason: SDUPTHER

## 2019-06-10 NOTE — PROGRESS NOTES
Lv Anderson 1945, is a 68 y.o. female depressed right after delivering her first child who is seen today for routine physical exam and reevaluation of hypertension obesity GERD hyperlipidemia. She tells me that her right hip has been hurting somewhat with certain movements that she feels in the right inguinal region and into the medial thigh. She also tells me that she has been depressed and stressed regarding her 's issues, she has been depressed she had a young baby and her first   her but she did not use medication at that time. When she gets up in the morning she just does not feel like doing anything no energy and does not even want to get up, she feels like she has not slept well every night for several months. She is having no reflux as she continues omeprazole. She takes all of her medicine correctly. Past Medical History:   Diagnosis Date    Asthma with COPD ([facility] [address]     Bilateral pulmonary embolism ([facility] [address] 09/2015    Unprovoked. Negative hypercoaguable workup. Completed 6 months of Xarelto.  Cervical spondylosis     Colon adenoma     CTS (carpal tunnel syndrome)     Cystitis cystica 02/2016    Intermittent symptomatic UTIs. Evaluation by Dr. Valencia Greco. Negative abdom. CT scan and renal ultrasound. Cystoscopy showing diffuse cystitis cystica.  Family history of colon cancer     GERD (gastroesophageal reflux disease)     Hypertension     Labyrinthitis     Left sided sciatica     Lumbar spondylosis     Menopause     Microhematuria     Multiple pulmonary nodules determined by computed tomography of lung 03/2016    Bilateral. 3-5 mm in size. Dr. Kalpesh Gilliam.  Osteopenia     Prediabetes     Status post gastric bypass for obesity     Thyroid nodule 11/2015    Thyroid ultrasound with dominant 2.7 cm solid nodule mid-lower left lobe. Dr. Wolfgang Rendon. FNA with benign cytology.       Past Surgical History:   Procedure Laterality Date    APPENDECTOMY      HX APPENDECTOMY      HX CARPAL TUNNEL RELEASE      twice on left, once on right    HX CHOLECYSTECTOMY      HX GASTRIC BYPASS      HX GASTRIC BYPASS      HX HEMORRHOIDECTOMY      HX HERNIA REPAIR      HX HYSTERECTOMY      HX SHOULDER ARTHROSCOPY Left     LAP,CHOLECYSTECTOMY       Current Outpatient Medications   Medication Sig Dispense Refill    losartan (COZAAR) 25 mg tablet TAKE 1 TABLET BY MOUTH EVERY DAY 90 Tab 1    simvastatin (ZOCOR) 20 mg tablet Take 1 Tab by mouth every evening. 90 Tab 3    hydroCHLOROthiazide (HYDRODIURIL) 25 mg tablet Take 1 Tab by mouth daily. 90 Tab 3    diazePAM (VALIUM) 2 mg tablet Take 1 Tab by mouth every six (6) hours as needed for Anxiety. Max Daily Amount: 8 mg. 30 Tab 0    ondansetron hcl (ZOFRAN) 4 mg tablet Take 1 Tab by mouth every eight (8) hours as needed. 40 Tab 1    cranberry extract 450 mg tab Take  by mouth.  albuterol (PROVENTIL HFA, VENTOLIN HFA, PROAIR HFA) 90 mcg/actuation inhaler Take 1-2 Puffs by inhalation every four (4) hours as needed for Wheezing. 1 Inhaler 3    inhalational spacing device 1 Each by Does Not Apply route as needed. 1 Device 0    cholecalciferol, vitamin d3, (VITAMIN D) 1,000 unit tablet Take 1,000 Units by mouth two (2) times a day.  cyanocobalamin (VITAMIN B-12) 500 mcg tablet Take 500 mcg by mouth daily.  CALCIUM CARBONATE/VITAMIN D3 (OS- + D PO) Take 1 Cap by mouth two (2) times a day.  POLYETHYLENE GLYCOL 3350 (MIRALAX PO) Take  by mouth nightly.  omeprazole (PRILOSEC) 20 mg capsule Take 20 mg by mouth daily.  mirabegron ER (MYRBETRIQ) 25 mg ER tablet Take 1 Tab by mouth daily. 30 Tab 0    estradiol (ESTRACE) 0.01 % (0.1 mg/gram) vaginal cream Apply 0.5 grams with fingertip to urethral opening twice weekly. 42.5 g 0    cyclobenzaprine (FLEXERIL) 10 mg tablet Take 1 Tab by mouth three (3) times daily as needed for Muscle Spasm(s).  30 Tab 0     Allergies   Allergen Reactions    Macrobid [Nitrofurantoin Monohyd/M-Cryst] Anaphylaxis    Aspirin Unknown (comments)     Cannot take due to gastric bypass.  Lisinopril Cough    Parafon Forte Dsc [Chlorzoxazone] Other (comments)     Excessive drowsiness     Social History     Socioeconomic History    Marital status:      Spouse name: Not on file    Number of children: Not on file    Years of education: Not on file    Highest education level: Not on file   Tobacco Use    Smoking status: Former Smoker     Packs/day: 3.00     Years: 20.00     Pack years: 60.00     Types: Cigarettes     Start date: 1963     Last attempt to quit: 1983     Years since quittin.4    Smokeless tobacco: Never Used   Substance and Sexual Activity    Alcohol use: No     Alcohol/week: 0.0 oz    Drug use: No    Sexual activity: Never     Visit Vitals  /68   Pulse 75   Temp 98.4 °F (36.9 °C) (Oral)   Resp 14   Ht 5' 4\" (1.626 m)   Wt 222 lb (100.7 kg)   SpO2 96%   BMI 38.11 kg/m²     Carotids are 2+ without bruits. No adenopathy or thyromegaly. Lungs are clear to percussion. Good breath sounds with no wheezing or crackles. Heart reveals a regular rhythm with normal S1 and S2 no murmur gallop click or rub. Apical impulse is not palpable. Abdomen is soft and nontender with no hepatosplenomegaly or masses and no bruits. Extremities reveal no clubbing cyanosis or edema. Pulses are 2+. Mammogram was normal , she does not need a breast exam today.     Results for orders placed or performed during the hospital encounter of 19   CBC WITH AUTOMATED DIFF   Result Value Ref Range    WBC 6.6 4.6 - 13.2 K/uL    RBC 4.36 4.20 - 5.30 M/uL    HGB 13.1 12.0 - 16.0 g/dL    HCT 41.6 35.0 - 45.0 %    MCV 95.4 74.0 - 97.0 FL    MCH 30.0 24.0 - 34.0 PG    MCHC 31.5 31.0 - 37.0 g/dL    RDW 13.1 11.6 - 14.5 %    PLATELET 878 612 - 461 K/uL    MPV 10.1 9.2 - 11.8 FL    NEUTROPHILS 60 40 - 73 %    LYMPHOCYTES 29 21 - 52 %    MONOCYTES 8 3 - 10 % EOSINOPHILS 2 0 - 5 %    BASOPHILS 1 0 - 2 %    ABS. NEUTROPHILS 4.0 1.8 - 8.0 K/UL    ABS. LYMPHOCYTES 1.9 0.9 - 3.6 K/UL    ABS. MONOCYTES 0.5 0.05 - 1.2 K/UL    ABS. EOSINOPHILS 0.1 0.0 - 0.4 K/UL    ABS. BASOPHILS 0.1 0.0 - 0.1 K/UL    DF AUTOMATED     LIPID PANEL   Result Value Ref Range    LIPID PROFILE          Cholesterol, total 162 <200 MG/DL    Triglyceride 227 (H) <150 MG/DL    HDL Cholesterol 55 40 - 60 MG/DL    LDL, calculated 61.6 0 - 100 MG/DL    VLDL, calculated 45.4 MG/DL    CHOL/HDL Ratio 2.9 0 - 5.0     METABOLIC PANEL, COMPREHENSIVE   Result Value Ref Range    Sodium 141 136 - 145 mmol/L    Potassium 4.0 3.5 - 5.5 mmol/L    Chloride 105 100 - 108 mmol/L    CO2 26 21 - 32 mmol/L    Anion gap 10 3.0 - 18 mmol/L    Glucose 97 74 - 99 mg/dL    BUN 18 7.0 - 18 MG/DL    Creatinine 0.95 0.6 - 1.3 MG/DL    BUN/Creatinine ratio 19 12 - 20      GFR est AA >60 >60 ml/min/1.73m2    GFR est non-AA 58 (L) >60 ml/min/1.73m2    Calcium 9.4 8.5 - 10.1 MG/DL    Bilirubin, total 0.7 0.2 - 1.0 MG/DL    ALT (SGPT) 19 13 - 56 U/L    AST (SGOT) 14 (L) 15 - 37 U/L    Alk. phosphatase 128 (H) 45 - 117 U/L    Protein, total 6.9 6.4 - 8.2 g/dL    Albumin 4.0 3.4 - 5.0 g/dL    Globulin 2.9 2.0 - 4.0 g/dL    A-G Ratio 1.4 0.8 - 1.7       Assessment: #1. Hypertension well controlled, she will continue hydrochlorothiazide 25 mg daily and losartan 25 mg daily. #2.  GERD asymptomatic, she will continue omeprazole 20 mg daily. #3. Hyperlipidemia doing well, she will continue simvastatin 20 mg each evening. #4.  Obesity with body mass index of 38, she is going to keep working on weight loss. #5.  Probable arthritis right hip, she does have quite good range of motion with only minimal discomfort with internal rotation currently. #6. She is currently depressed and stressed regarding her 's issues.   I talked to her at length about this problem and she agrees to try Lexapro 10 mg, one half daily for 4 days and then 1 daily thereafter. She is to call me if she has significant side effects but if she has minor side effect she is encouraged to continue the medicine, though side effects usually clear. Follow-up 6 weeks to reassess depression. Sam Gaytan MD FACP    Please note: This document has been produced using voice recognition software. Unrecognized errors in transcription may be present.

## 2019-06-10 NOTE — PROGRESS NOTES
Ilda Arab presents today for   Chief Complaint   Patient presents with    Physical     labs                                              room 9              Depression Screening:  3 most recent PHQ Screens 6/10/2019   Little interest or pleasure in doing things Not at all   Feeling down, depressed, irritable, or hopeless Several days   Total Score PHQ 2 1       Learning Assessment:  Learning Assessment 6/27/2017   PRIMARY LEARNER Patient   HIGHEST LEVEL OF EDUCATION - PRIMARY LEARNER  -   BARRIERS PRIMARY LEARNER -   CO-LEARNER CAREGIVER -   PRIMARY LANGUAGE ENGLISH   LEARNER PREFERENCE PRIMARY LISTENING   ANSWERED BY patient   RELATIONSHIP SELF       Abuse Screening:  Abuse Screening Questionnaire 6/10/2019   Do you ever feel afraid of your partner? N   Are you in a relationship with someone who physically or mentally threatens you? N   Is it safe for you to go home? Y       Fall Risk  Fall Risk Assessment, last 12 mths 6/10/2019   Able to walk? Yes   Fall in past 12 months? No           Coordination of Care:  1. Have you been to the ER, urgent care clinic since your last visit? Hospitalized since your last visit? no    2. Have you seen or consulted any other health care providers outside of the [address] [place] [address] since your last visit? Include any pap smears or colon screening.  no

## 2019-07-22 ENCOUNTER — OFFICE VISIT (OUTPATIENT)
Dept: INTERNAL MEDICINE CLINIC | Age: 74
End: 2019-07-22

## 2019-07-22 VITALS
RESPIRATION RATE: 12 BRPM | DIASTOLIC BLOOD PRESSURE: 64 MMHG | HEIGHT: 64 IN | TEMPERATURE: 98.6 F | WEIGHT: 218.8 LBS | SYSTOLIC BLOOD PRESSURE: 124 MMHG | OXYGEN SATURATION: 98 % | BODY MASS INDEX: 37.36 KG/M2 | HEART RATE: 71 BPM

## 2019-07-22 DIAGNOSIS — E78.5 HYPERLIPIDEMIA LDL GOAL <100: ICD-10-CM

## 2019-07-22 DIAGNOSIS — T63.461A WASP STING, ACCIDENTAL OR UNINTENTIONAL, INITIAL ENCOUNTER: ICD-10-CM

## 2019-07-22 DIAGNOSIS — F32.5 MAJOR DEPRESSION IN COMPLETE REMISSION (HCC): Primary | ICD-10-CM

## 2019-07-22 DIAGNOSIS — R05.3 PERSISTENT COUGH: ICD-10-CM

## 2019-07-22 DIAGNOSIS — E66.9 OBESITY (BMI 30-39.9): ICD-10-CM

## 2019-07-22 PROBLEM — E66.01 SEVERE OBESITY WITH BODY MASS INDEX (BMI) OF 35.0 TO 39.9 WITH SERIOUS COMORBIDITY (HCC): Status: RESOLVED | Noted: 2018-06-28 | Resolved: 2019-07-22

## 2019-07-22 RX ORDER — ESCITALOPRAM OXALATE 10 MG/1
10 TABLET ORAL DAILY
Qty: 90 TAB | Refills: 2 | Status: SHIPPED | OUTPATIENT
Start: 2019-07-22 | End: 2020-05-04 | Stop reason: SDUPTHER

## 2019-07-22 RX ORDER — ALBUTEROL SULFATE 90 UG/1
1-2 AEROSOL, METERED RESPIRATORY (INHALATION)
Qty: 1 INHALER | Refills: 3 | Status: SHIPPED | OUTPATIENT
Start: 2019-07-22 | End: 2020-11-25 | Stop reason: SDUPTHER

## 2019-07-22 NOTE — PROGRESS NOTES
Vivian Newton 1945, is a 68 y.o. female, who is seen today for reevaluation of obesity hyperlipidemia GERD hypertension. She feels much better since she has been on Lexapro the last 6 weeks. She can deal with her 's issues and feels back to normal.  She is working on her weight and is lost 5 pounds by using a smaller plate and doing a puzzle while she eats to slow down with eating. She also notes that 4 days ago she was stung on the upper inner right arm by a wasp and it swelled and was sore and itchy and she started with baking soda then some ice intermittently and after that calamine and that seemed to help the most.    Past Medical History:   Diagnosis Date    Asthma with COPD ([address]     Bilateral pulmonary embolism ([facility] [address] 09/2015    Unprovoked. Negative hypercoaguable workup. Completed 6 months of Xarelto.  Cervical spondylosis     Colon adenoma     CTS (carpal tunnel syndrome)     Cystitis cystica 02/2016    Intermittent symptomatic UTIs. Evaluation by Dr. Rojas Zaidi. Negative abdom. CT scan and renal ultrasound. Cystoscopy showing diffuse cystitis cystica.  Family history of colon cancer     GERD (gastroesophageal reflux disease)     Hypertension     Labyrinthitis     Left sided sciatica     Lumbar spondylosis     Menopause     Microhematuria     Multiple pulmonary nodules determined by computed tomography of lung 03/2016    Bilateral. 3-5 mm in size. Dr. Roman Chavira.  Osteopenia     Prediabetes     Status post gastric bypass for obesity     Thyroid nodule 11/2015    Thyroid ultrasound with dominant 2.7 cm solid nodule mid-lower left lobe. Dr. Larry Escalante. FNA with benign cytology. Current Outpatient Medications   Medication Sig Dispense Refill    escitalopram oxalate (LEXAPRO) 10 mg tablet Take 1 Tab by mouth daily.  30 Tab 1    losartan (COZAAR) 25 mg tablet TAKE 1 TABLET BY MOUTH EVERY DAY 90 Tab 1    simvastatin (ZOCOR) 20 mg tablet Take 1 Tab by mouth every evening. 90 Tab 3    hydroCHLOROthiazide (HYDRODIURIL) 25 mg tablet Take 1 Tab by mouth daily. 90 Tab 3    mirabegron ER (MYRBETRIQ) 25 mg ER tablet Take 1 Tab by mouth daily. 30 Tab 0    estradiol (ESTRACE) 0.01 % (0.1 mg/gram) vaginal cream Apply 0.5 grams with fingertip to urethral opening twice weekly. 42.5 g 0    diazePAM (VALIUM) 2 mg tablet Take 1 Tab by mouth every six (6) hours as needed for Anxiety. Max Daily Amount: 8 mg. 30 Tab 0    cyclobenzaprine (FLEXERIL) 10 mg tablet Take 1 Tab by mouth three (3) times daily as needed for Muscle Spasm(s). 30 Tab 0    ondansetron hcl (ZOFRAN) 4 mg tablet Take 1 Tab by mouth every eight (8) hours as needed. 40 Tab 1    cranberry extract 450 mg tab Take  by mouth.  albuterol (PROVENTIL HFA, VENTOLIN HFA, PROAIR HFA) 90 mcg/actuation inhaler Take 1-2 Puffs by inhalation every four (4) hours as needed for Wheezing. 1 Inhaler 3    inhalational spacing device 1 Each by Does Not Apply route as needed. 1 Device 0    cholecalciferol, vitamin d3, (VITAMIN D) 1,000 unit tablet Take 1,000 Units by mouth two (2) times a day.  cyanocobalamin (VITAMIN B-12) 500 mcg tablet Take 500 mcg by mouth daily.  CALCIUM CARBONATE/VITAMIN D3 (OS- + D PO) Take 1 Cap by mouth two (2) times a day.  POLYETHYLENE GLYCOL 3350 (MIRALAX PO) Take  by mouth nightly.  omeprazole (PRILOSEC) 20 mg capsule Take 20 mg by mouth daily. Visit Vitals  /64 (BP 1 Location: Left arm, BP Patient Position: Sitting)   Pulse 71   Temp 98.6 °F (37 °C) (Oral)   Resp 12   Ht 5' 4\" (1.626 m)   Wt 218 lb 12.8 oz (99.2 kg)   SpO2 98%   BMI 37.56 kg/m²     Her upper right arm medially reveals a tiny dot where the sting occurred and mild redness fairly diffusely in the upper arm but no induration or tenderness. Assessment: #1. Depression much improved. She will continue Lexapro for the moderate to long-term. Stay on at least a year.   #2.  Obesity, working on weight loss with new intensity. Encouraged her to continue with smaller portions and eating more slowly. #3. Wasp staying on her upper right arm from 4 days ago and it is much better. No further treatment is needed. #4. Hypertension well controlled, she will continue hydrochlorthiazide 25 mg daily and losartan 25 mg daily. Follow-up mid December with lab    [facility]. Natividad Levin MD FACP    Please note: This document has been produced using voice recognition software. Unrecognized errors in transcription may be present. This visit lasted 25 minutes, greater than 50% of the time was spent counseling as above.

## 2019-09-25 RX ORDER — HYDROCHLOROTHIAZIDE 25 MG/1
TABLET ORAL
Qty: 90 TAB | Refills: 3 | Status: SHIPPED | OUTPATIENT
Start: 2019-09-25 | End: 2020-09-14 | Stop reason: SDUPTHER

## 2019-10-17 DIAGNOSIS — R05.3 PERSISTENT COUGH: ICD-10-CM

## 2019-10-21 RX ORDER — LOSARTAN POTASSIUM 25 MG/1
TABLET ORAL
Qty: 90 TAB | Refills: 1 | Status: SHIPPED | OUTPATIENT
Start: 2019-10-21 | End: 2020-01-30

## 2019-12-02 RX ORDER — SIMVASTATIN 20 MG/1
TABLET, FILM COATED ORAL
Qty: 90 TAB | Refills: 3 | Status: SHIPPED | OUTPATIENT
Start: 2019-12-02 | End: 2020-11-18 | Stop reason: SDUPTHER

## 2020-01-20 ENCOUNTER — APPOINTMENT (OUTPATIENT)
Dept: INTERNAL MEDICINE CLINIC | Age: 75
End: 2020-01-20

## 2020-01-20 ENCOUNTER — HOSPITAL ENCOUNTER (OUTPATIENT)
Dept: LAB | Age: 75
Discharge: HOME | End: 2020-01-20
Payer: MEDICARE

## 2020-01-20 DIAGNOSIS — E78.5 HYPERLIPIDEMIA LDL GOAL <100: ICD-10-CM

## 2020-01-20 LAB
ALBUMIN SERPL-MCNC: 3.9 G/DL (ref 3.4–5)
ALBUMIN/GLOB SERPL: 1.2 {RATIO} (ref 0.8–1.7)
ALP SERPL-CCNC: 111 U/L (ref 45–117)
ALT SERPL-CCNC: 17 U/L (ref 13–56)
ANION GAP SERPL CALC-SCNC: 6 MMOL/L (ref 3–18)
AST SERPL-CCNC: 15 U/L (ref 10–38)
BILIRUB SERPL-MCNC: 0.5 MG/DL (ref 0.2–1)
BUN SERPL-MCNC: 19 MG/DL (ref 7–18)
BUN/CREAT SERPL: 23 (ref 12–20)
CALCIUM SERPL-MCNC: 9.7 MG/DL (ref 8.5–10.1)
CHLORIDE SERPL-SCNC: 102 MMOL/L (ref 100–111)
CHOLEST SERPL-MCNC: 160 MG/DL
CO2 SERPL-SCNC: 32 MMOL/L (ref 21–32)
CREAT SERPL-MCNC: 0.84 MG/DL (ref 0.6–1.3)
GLOBULIN SER CALC-MCNC: 3.2 G/DL (ref 2–4)
GLUCOSE SERPL-MCNC: 85 MG/DL (ref 74–99)
HDLC SERPL-MCNC: 53 MG/DL (ref 40–60)
HDLC SERPL: 3 {RATIO} (ref 0–5)
LDLC SERPL CALC-MCNC: 67 MG/DL (ref 0–100)
LIPID PROFILE,FLP: ABNORMAL
POTASSIUM SERPL-SCNC: 3.9 MMOL/L (ref 3.5–5.5)
PROT SERPL-MCNC: 7.1 G/DL (ref 6.4–8.2)
SODIUM SERPL-SCNC: 140 MMOL/L (ref 136–145)
TRIGL SERPL-MCNC: 200 MG/DL (ref ?–150)
VLDLC SERPL CALC-MCNC: 40 MG/DL

## 2020-01-20 PROCEDURE — 36415 COLL VENOUS BLD VENIPUNCTURE: CPT

## 2020-01-20 PROCEDURE — 80053 COMPREHEN METABOLIC PANEL: CPT

## 2020-01-20 PROCEDURE — 80061 LIPID PANEL: CPT

## 2020-01-27 ENCOUNTER — OFFICE VISIT (OUTPATIENT)
Dept: INTERNAL MEDICINE CLINIC | Age: 75
End: 2020-01-27

## 2020-01-27 VITALS
TEMPERATURE: 97.9 F | HEIGHT: 64 IN | SYSTOLIC BLOOD PRESSURE: 126 MMHG | WEIGHT: 210.8 LBS | DIASTOLIC BLOOD PRESSURE: 60 MMHG | BODY MASS INDEX: 35.99 KG/M2 | HEART RATE: 61 BPM | OXYGEN SATURATION: 96 % | RESPIRATION RATE: 12 BRPM

## 2020-01-27 DIAGNOSIS — H81.10 BENIGN PAROXYSMAL POSITIONAL VERTIGO, UNSPECIFIED LATERALITY: ICD-10-CM

## 2020-01-27 DIAGNOSIS — M25.551 PAIN OF RIGHT HIP JOINT: ICD-10-CM

## 2020-01-27 DIAGNOSIS — I10 PRIMARY HYPERTENSION: ICD-10-CM

## 2020-01-27 DIAGNOSIS — F32.5 MAJOR DEPRESSION IN COMPLETE REMISSION (HCC): ICD-10-CM

## 2020-01-27 DIAGNOSIS — E78.5 HYPERLIPIDEMIA LDL GOAL <100: Primary | ICD-10-CM

## 2020-01-27 RX ORDER — TRAMADOL HYDROCHLORIDE 50 MG/1
50 TABLET ORAL
Qty: 90 TAB | Refills: 1 | Status: SHIPPED | OUTPATIENT
Start: 2020-01-27 | End: 2020-04-26

## 2020-01-27 RX ORDER — MECLIZINE HYDROCHLORIDE 25 MG/1
TABLET ORAL
Qty: 60 TAB | Refills: 2 | Status: SHIPPED | OUTPATIENT
Start: 2020-01-27 | End: 2021-01-04 | Stop reason: SDUPTHER

## 2020-01-27 NOTE — PROGRESS NOTES
Mirlande Speaker 1945, is a 76 y.o. female, who is seen today for reevaluation of hypertension, hyperlipidemia, obesity depression and now also complaining of right hip pain and vertigo. She has had vertigo in the past but now since September she has it almost every day when she lays down with her head in certain directions and when she is on her feet and moves her head certain ways. It is not a spinning but certainly movement of her surroundings. She also says she fell August 11 and since then she has been having pain in the right inguinal region the right lumbar area and sometimes into the  anterior thigh as well. She has used tramadol in the past but not for the last 2 or 3 years. She is not depressed that she continues Lexapro. Lungs are clear to percussion. Past Medical History:   Diagnosis Date    Asthma with COPD ([address]     Bilateral pulmonary embolism ([address] 09/2015    Unprovoked. Negative hypercoaguable workup. Completed 6 months of Xarelto.  Cervical spondylosis     Colon adenoma     CTS (carpal tunnel syndrome)     Cystitis cystica 02/2016    Intermittent symptomatic UTIs. Evaluation by Dr. Alyssa Lynn. Negative abdom. CT scan and renal ultrasound. Cystoscopy showing diffuse cystitis cystica.  Family history of colon cancer     GERD (gastroesophageal reflux disease)     Hypertension     Labyrinthitis     Left sided sciatica     Lumbar spondylosis     Menopause     Microhematuria     Multiple pulmonary nodules determined by computed tomography of lung 03/2016    Bilateral. 3-5 mm in size. Dr. Sasha Blevins.  Osteopenia     Prediabetes     Status post gastric bypass for obesity     Thyroid nodule 11/2015    Thyroid ultrasound with dominant 2.7 cm solid nodule mid-lower left lobe. Dr. Seth August. FNA with benign cytology.       Current Outpatient Medications   Medication Sig Dispense Refill    simvastatin (ZOCOR) 20 mg tablet TAKE 1 TABLET BY MOUTH EVERY DAY IN THE EVENING 90 Tab 3    losartan (COZAAR) 25 mg tablet TAKE 1 TABLET BY MOUTH EVERY DAY 90 Tab 1    hydroCHLOROthiazide (HYDRODIURIL) 25 mg tablet TAKE 1 TABLET BY MOUTH EVERY DAY 90 Tab 3    albuterol (PROVENTIL HFA, VENTOLIN HFA, PROAIR HFA) 90 mcg/actuation inhaler Take 1-2 Puffs by inhalation every four (4) hours as needed for Wheezing. 1 Inhaler 3    escitalopram oxalate (LEXAPRO) 10 mg tablet Take 1 Tab by mouth daily. 90 Tab 2    estradiol (ESTRACE) 0.01 % (0.1 mg/gram) vaginal cream Apply 0.5 grams with fingertip to urethral opening twice weekly. 42.5 g 0    diazePAM (VALIUM) 2 mg tablet Take 1 Tab by mouth every six (6) hours as needed for Anxiety. Max Daily Amount: 8 mg. 30 Tab 0    ondansetron hcl (ZOFRAN) 4 mg tablet Take 1 Tab by mouth every eight (8) hours as needed. 40 Tab 1    cranberry extract 450 mg tab Take  by mouth.  inhalational spacing device 1 Each by Does Not Apply route as needed. 1 Device 0    cholecalciferol, vitamin d3, (VITAMIN D) 1,000 unit tablet Take 1,000 Units by mouth two (2) times a day.  cyanocobalamin (VITAMIN B-12) 500 mcg tablet Take 500 mcg by mouth daily.  CALCIUM CARBONATE/VITAMIN D3 (OS- + D PO) Take 1 Cap by mouth two (2) times a day.  POLYETHYLENE GLYCOL 3350 (MIRALAX PO) Take  by mouth nightly.  omeprazole (PRILOSEC) 20 mg capsule Take 20 mg by mouth daily. Visit Vitals  /60 (BP 1 Location: Left arm, BP Patient Position: Sitting)   Pulse 61   Temp 97.9 °F (36.6 °C) (Oral)   Resp 12   Ht 5' 4\" (1.626 m)   Wt 210 lb 12.8 oz (95.6 kg)   SpO2 96%   BMI 36.18 kg/m²     Lungs are clear to percussion. Good breath sounds with no wheezing or crackles. Heart reveals a regular rhythm with normal S1 and S2 no murmur gallop click or rub. Apical impulse is not palpable. Abdomen is soft and nontender with no obvious hepatosplenomegaly or masses or bruits. Extremities reveal no clubbing cyanosis or edema. Pulses are 2+.   With straight leg raising on the right she does have pain in the inguinal area only and with external rotation to the extreme there is some inguinal pain but internal rotation causes no pain or significant limitation. Results for orders placed or performed during the hospital encounter of 01/20/20   LIPID PANEL   Result Value Ref Range    LIPID PROFILE          Cholesterol, total 160 <200 MG/DL    Triglyceride 200 (H) <150 MG/DL    HDL Cholesterol 53 40 - 60 MG/DL    LDL, calculated 67 0 - 100 MG/DL    VLDL, calculated 40 MG/DL    CHOL/HDL Ratio 3.0 0 - 5.0     METABOLIC PANEL, COMPREHENSIVE   Result Value Ref Range    Sodium 140 136 - 145 mmol/L    Potassium 3.9 3.5 - 5.5 mmol/L    Chloride 102 100 - 111 mmol/L    CO2 32 21 - 32 mmol/L    Anion gap 6 3.0 - 18 mmol/L    Glucose 85 74 - 99 mg/dL    BUN 19 (H) 7.0 - 18 MG/DL    Creatinine 0.84 0.6 - 1.3 MG/DL    BUN/Creatinine ratio 23 (H) 12 - 20      GFR est AA >60 >60 ml/min/1.73m2    GFR est non-AA >60 >60 ml/min/1.73m2    Calcium 9.7 8.5 - 10.1 MG/DL    Bilirubin, total 0.5 0.2 - 1.0 MG/DL    ALT (SGPT) 17 13 - 56 U/L    AST (SGOT) 15 10 - 38 U/L    Alk. phosphatase 111 45 - 117 U/L    Protein, total 7.1 6.4 - 8.2 g/dL    Albumin 3.9 3.4 - 5.0 g/dL    Globulin 3.2 2.0 - 4.0 g/dL    A-G Ratio 1.2 0.8 - 1.7       Assessment: #1. Hypertension is well controlled, she will continue hydrochlorothiazide 25 mg daily and losartan 25 mg daily. 2.  She was quite depressed about 6 months ago and is doing very well, she will continue Lexapro 10 mg daily. #3. Hyperlipidemia doing well, she will continue simvastatin 20 mg each evening. #4.  Obesity little changed, of encouraged her to do the best she can to keep her weight down. #5.  Right hip pain after a fall, she wants to use tramadol, I recommended she consider seeing an orthopedist for evaluation and x-rays but she wants to put that off for now.   #6.  History of benign positional vertigo, she has had that for several months now, we will treat symptomatically for the worst episodes with meclizine. Follow-up in late June or early July for complete evaluation      Sam Abbott MD FACP    Please note: This document has been produced using voice recognition software. Unrecognized errors in transcription may be present.

## 2020-05-04 RX ORDER — ESCITALOPRAM OXALATE 10 MG/1
10 TABLET ORAL DAILY
Qty: 90 TAB | Refills: 3 | Status: SHIPPED | OUTPATIENT
Start: 2020-05-04 | End: 2021-04-19 | Stop reason: SDUPTHER

## 2020-05-04 NOTE — TELEPHONE ENCOUNTER
Last Visit: 1/27/20 with MD Luis Ernandez  Next Appointment: 6/24/20 with MD Luis Ernandez  Previous Refill Encounter(s): 7/22/19 #90 with 2 refills    Requested Prescriptions     Pending Prescriptions Disp Refills    escitalopram oxalate (LEXAPRO) 10 mg tablet 90 Tab 3     Sig: Take 1 Tab by mouth daily.

## 2020-06-17 ENCOUNTER — APPOINTMENT (OUTPATIENT)
Dept: INTERNAL MEDICINE CLINIC | Age: 75
End: 2020-06-17

## 2020-06-17 ENCOUNTER — HOSPITAL ENCOUNTER (OUTPATIENT)
Dept: LAB | Age: 75
Discharge: HOME | End: 2020-06-17
Payer: COMMERCIAL

## 2020-06-17 DIAGNOSIS — I10 PRIMARY HYPERTENSION: ICD-10-CM

## 2020-06-17 DIAGNOSIS — E78.5 HYPERLIPIDEMIA LDL GOAL <100: ICD-10-CM

## 2020-06-17 LAB
ALBUMIN SERPL-MCNC: 3.8 G/DL (ref 3.4–5)
ALBUMIN/GLOB SERPL: 1.2 {RATIO} (ref 0.8–1.7)
ALP SERPL-CCNC: 116 U/L (ref 45–117)
ALT SERPL-CCNC: 22 U/L (ref 13–56)
ANION GAP SERPL CALC-SCNC: 4 MMOL/L (ref 3–18)
AST SERPL-CCNC: 16 U/L (ref 10–38)
BASOPHILS # BLD: 0.1 K/UL (ref 0–0.1)
BASOPHILS NFR BLD: 1 % (ref 0–2)
BILIRUB SERPL-MCNC: 0.5 MG/DL (ref 0.2–1)
BUN SERPL-MCNC: 18 MG/DL (ref 7–18)
BUN/CREAT SERPL: 20 (ref 12–20)
CALCIUM SERPL-MCNC: 9.6 MG/DL (ref 8.5–10.1)
CHLORIDE SERPL-SCNC: 101 MMOL/L (ref 100–111)
CHOLEST SERPL-MCNC: 174 MG/DL
CO2 SERPL-SCNC: 32 MMOL/L (ref 21–32)
CREAT SERPL-MCNC: 0.92 MG/DL (ref 0.6–1.3)
DIFFERENTIAL METHOD BLD: NORMAL
EOSINOPHIL # BLD: 0.2 K/UL (ref 0–0.4)
EOSINOPHIL NFR BLD: 3 % (ref 0–5)
ERYTHROCYTE [DISTWIDTH] IN BLOOD BY AUTOMATED COUNT: 13.1 % (ref 11.6–14.5)
GLOBULIN SER CALC-MCNC: 3.2 G/DL (ref 2–4)
GLUCOSE SERPL-MCNC: 92 MG/DL (ref 74–99)
HCT VFR BLD AUTO: 41.2 % (ref 35–45)
HDLC SERPL-MCNC: 54 MG/DL (ref 40–60)
HDLC SERPL: 3.2 {RATIO} (ref 0–5)
HGB BLD-MCNC: 13.2 G/DL (ref 12–16)
LDLC SERPL CALC-MCNC: 84.8 MG/DL (ref 0–100)
LIPID PROFILE,FLP: ABNORMAL
LYMPHOCYTES # BLD: 1.8 K/UL (ref 0.9–3.6)
LYMPHOCYTES NFR BLD: 30 % (ref 21–52)
MCH RBC QN AUTO: 30.3 PG (ref 24–34)
MCHC RBC AUTO-ENTMCNC: 32 G/DL (ref 31–37)
MCV RBC AUTO: 94.5 FL (ref 74–97)
MONOCYTES # BLD: 0.5 K/UL (ref 0.05–1.2)
MONOCYTES NFR BLD: 8 % (ref 3–10)
NEUTS SEG # BLD: 3.4 K/UL (ref 1.8–8)
NEUTS SEG NFR BLD: 58 % (ref 40–73)
PLATELET # BLD AUTO: 254 K/UL (ref 135–420)
PMV BLD AUTO: 11 FL (ref 9.2–11.8)
POTASSIUM SERPL-SCNC: 4.3 MMOL/L (ref 3.5–5.5)
PROT SERPL-MCNC: 7 G/DL (ref 6.4–8.2)
RBC # BLD AUTO: 4.36 M/UL (ref 4.2–5.3)
SODIUM SERPL-SCNC: 137 MMOL/L (ref 136–145)
T4 FREE SERPL-MCNC: 0.9 NG/DL (ref 0.7–1.5)
TRIGL SERPL-MCNC: 176 MG/DL (ref ?–150)
TSH SERPL DL<=0.05 MIU/L-ACNC: 1.24 UIU/ML (ref 0.36–3.74)
VLDLC SERPL CALC-MCNC: 35.2 MG/DL
WBC # BLD AUTO: 5.9 K/UL (ref 4.6–13.2)

## 2020-06-17 PROCEDURE — 80053 COMPREHEN METABOLIC PANEL: CPT

## 2020-06-17 PROCEDURE — 84443 ASSAY THYROID STIM HORMONE: CPT

## 2020-06-17 PROCEDURE — 84439 ASSAY OF FREE THYROXINE: CPT

## 2020-06-17 PROCEDURE — 80061 LIPID PANEL: CPT

## 2020-06-17 PROCEDURE — 85025 COMPLETE CBC W/AUTO DIFF WBC: CPT

## 2020-06-17 PROCEDURE — 36415 COLL VENOUS BLD VENIPUNCTURE: CPT

## 2020-06-24 ENCOUNTER — OFFICE VISIT (OUTPATIENT)
Dept: INTERNAL MEDICINE CLINIC | Age: 75
End: 2020-06-24

## 2020-06-24 VITALS
DIASTOLIC BLOOD PRESSURE: 60 MMHG | WEIGHT: 219 LBS | RESPIRATION RATE: 14 BRPM | SYSTOLIC BLOOD PRESSURE: 136 MMHG | TEMPERATURE: 98.4 F | OXYGEN SATURATION: 98 % | HEIGHT: 64 IN | BODY MASS INDEX: 37.39 KG/M2 | HEART RATE: 77 BPM

## 2020-06-24 DIAGNOSIS — M25.551 HIP PAIN, RIGHT: ICD-10-CM

## 2020-06-24 DIAGNOSIS — E78.5 HYPERLIPIDEMIA LDL GOAL <100: ICD-10-CM

## 2020-06-24 DIAGNOSIS — F32.5 MAJOR DEPRESSION IN COMPLETE REMISSION (HCC): ICD-10-CM

## 2020-06-24 DIAGNOSIS — E66.9 OBESITY (BMI 30-39.9): ICD-10-CM

## 2020-06-24 DIAGNOSIS — I10 PRIMARY HYPERTENSION: Primary | ICD-10-CM

## 2020-06-24 NOTE — PROGRESS NOTES
Nitesh Fitting 1945, is a 76 y.o. female, who is seen today for reevaluation of hypertension hyperlipidemia obesity depression and right hip pain. She has had chronic back pain all of her adult life but fell nearly a year ago and since then has been having pain in the right inguinal region but also into the anterior thigh when she walks or stands also into the lateral and posterior hip region on the right. If she sits for very long and starts to hurt particularly posterior laterally and even in bed it will hurt some. She has used tramadol with some relief. She has declined to see an orthopedist when I talked to her about that in the past.  She takes all of her medicine correctly. She was depressed a year ago having not been depressed since the birth of her first child and now since she has been on Lexapro she is no longer depressed. Carotids  Past Medical History:   Diagnosis Date    Asthma with COPD ([address]     Bilateral pulmonary embolism ([address] 09/2015    Unprovoked. Negative hypercoaguable workup. Completed 6 months of Xarelto.  Cervical spondylosis     Colon adenoma     CTS (carpal tunnel syndrome)     Cystitis cystica 02/2016    Intermittent symptomatic UTIs. Evaluation by Dr. Quin Recio. Negative abdom. CT scan and renal ultrasound. Cystoscopy showing diffuse cystitis cystica.  Family history of colon cancer     GERD (gastroesophageal reflux disease)     Hypertension     Labyrinthitis     Left sided sciatica     Lumbar spondylosis     Menopause     Microhematuria     Multiple pulmonary nodules determined by computed tomography of lung 03/2016    Bilateral. 3-5 mm in size. Dr. Trinh Wiseman.  Osteopenia     Prediabetes     Status post gastric bypass for obesity     Thyroid nodule 11/2015    Thyroid ultrasound with dominant 2.7 cm solid nodule mid-lower left lobe. Dr. Nuha Canela. FNA with benign cytology.       Past Surgical History:   Procedure Laterality Date    APPENDECTOMY      HX APPENDECTOMY      HX CARPAL TUNNEL RELEASE      twice on left, once on right    HX CHOLECYSTECTOMY      HX GASTRIC BYPASS      HX GASTRIC BYPASS      HX HEMORRHOIDECTOMY      HX HERNIA REPAIR      HX HYSTERECTOMY      HX SHOULDER ARTHROSCOPY Left     LAP,CHOLECYSTECTOMY       Current Outpatient Medications   Medication Sig Dispense Refill    escitalopram oxalate (LEXAPRO) 10 mg tablet Take 1 Tab by mouth daily. 90 Tab 3    losartan (COZAAR) 25 mg tablet TAKE 1 TABLET BY MOUTH EVERY DAY 90 Tab 3    meclizine (ANTIVERT) 25 mg tablet 1 tablet by mouth every 8 hours as needed for vertigo 60 Tab 2    simvastatin (ZOCOR) 20 mg tablet TAKE 1 TABLET BY MOUTH EVERY DAY IN THE EVENING 90 Tab 3    hydroCHLOROthiazide (HYDRODIURIL) 25 mg tablet TAKE 1 TABLET BY MOUTH EVERY DAY 90 Tab 3    albuterol (PROVENTIL HFA, VENTOLIN HFA, PROAIR HFA) 90 mcg/actuation inhaler Take 1-2 Puffs by inhalation every four (4) hours as needed for Wheezing. 1 Inhaler 3    estradiol (ESTRACE) 0.01 % (0.1 mg/gram) vaginal cream Apply 0.5 grams with fingertip to urethral opening twice weekly. 42.5 g 0    diazePAM (VALIUM) 2 mg tablet Take 1 Tab by mouth every six (6) hours as needed for Anxiety. Max Daily Amount: 8 mg. 30 Tab 0    ondansetron hcl (ZOFRAN) 4 mg tablet Take 1 Tab by mouth every eight (8) hours as needed. 40 Tab 1    cranberry extract 450 mg tab Take  by mouth.  inhalational spacing device 1 Each by Does Not Apply route as needed. 1 Device 0    cholecalciferol, vitamin d3, (VITAMIN D) 1,000 unit tablet Take 1,000 Units by mouth two (2) times a day.  cyanocobalamin (VITAMIN B-12) 500 mcg tablet Take 500 mcg by mouth daily.  CALCIUM CARBONATE/VITAMIN D3 (OS- + D PO) Take 1 Cap by mouth two (2) times a day.  POLYETHYLENE GLYCOL 3350 (MIRALAX PO) Take  by mouth nightly.  omeprazole (PRILOSEC) 20 mg capsule Take 20 mg by mouth daily.        Allergies   Allergen Reactions    Macrobid [Nitrofurantoin Monohyd/M-Cryst] Anaphylaxis    Aspirin Unknown (comments)     Cannot take due to gastric bypass.  Lisinopril Cough    Parafon Forte Dsc [Chlorzoxazone] Other (comments)     Excessive drowsiness     Social History     Socioeconomic History    Marital status:      Spouse name: Not on file    Number of children: Not on file    Years of education: Not on file    Highest education level: Not on file   Tobacco Use    Smoking status: Former Smoker     Packs/day: 3.00     Years: 20.00     Pack years: 60.00     Types: Cigarettes     Start date: 1963     Last attempt to quit: 1983     Years since quittin.4    Smokeless tobacco: Never Used   Substance and Sexual Activity    Alcohol use: No     Alcohol/week: 0.0 standard drinks    Drug use: No    Sexual activity: Never     Visit Vitals  /60 (BP 1 Location: Right arm, BP Patient Position: Sitting)   Pulse 77   Temp 98.4 °F (36.9 °C) (Oral)   Resp 14   Ht 5' 4\" (1.626 m)   Wt 219 lb (99.3 kg)   SpO2 98%   BMI 37.59 kg/m²     Neck reveals no adenopathy or thyromegaly. Carotids are 2+ without bruits. Lungs are clear to percussion. Good breath sounds with no wheezing or crackles. Heart reveals a regular rhythm with normal S1 and S2 no murmur gallop click or rub. Apical impulse is not palpable. Abdomen is soft and nontender with no hepatosplenomegaly or masses and no bruits. Extremities reveal no clubbing cyanosis or edema. Pulses are 2+. Straight leg raising is normal on the right. Internal and external rotation is good with minimal discomfort laterally and there is some tenderness over the lateral right hip.     Results for orders placed or performed during the hospital encounter of 20   CBC WITH AUTOMATED DIFF   Result Value Ref Range    WBC 5.9 4.6 - 13.2 K/uL    RBC 4.36 4.20 - 5.30 M/uL    HGB 13.2 12.0 - 16.0 g/dL    HCT 41.2 35.0 - 45.0 %    MCV 94.5 74.0 - 97.0 FL    MCH 30.3 24.0 - 34.0 PG    MCHC 32.0 31.0 - 37.0 g/dL    RDW 13.1 11.6 - 14.5 %    PLATELET 297 007 - 326 K/uL    MPV 11.0 9.2 - 11.8 FL    NEUTROPHILS 58 40 - 73 %    LYMPHOCYTES 30 21 - 52 %    MONOCYTES 8 3 - 10 %    EOSINOPHILS 3 0 - 5 %    BASOPHILS 1 0 - 2 %    ABS. NEUTROPHILS 3.4 1.8 - 8.0 K/UL    ABS. LYMPHOCYTES 1.8 0.9 - 3.6 K/UL    ABS. MONOCYTES 0.5 0.05 - 1.2 K/UL    ABS. EOSINOPHILS 0.2 0.0 - 0.4 K/UL    ABS. BASOPHILS 0.1 0.0 - 0.1 K/UL    DF AUTOMATED     LIPID PANEL   Result Value Ref Range    LIPID PROFILE          Cholesterol, total 174 <200 MG/DL    Triglyceride 176 (H) <150 MG/DL    HDL Cholesterol 54 40 - 60 MG/DL    LDL, calculated 84.8 0 - 100 MG/DL    VLDL, calculated 35.2 MG/DL    CHOL/HDL Ratio 3.2 0 - 5.0     METABOLIC PANEL, COMPREHENSIVE   Result Value Ref Range    Sodium 137 136 - 145 mmol/L    Potassium 4.3 3.5 - 5.5 mmol/L    Chloride 101 100 - 111 mmol/L    CO2 32 21 - 32 mmol/L    Anion gap 4 3.0 - 18 mmol/L    Glucose 92 74 - 99 mg/dL    BUN 18 7.0 - 18 MG/DL    Creatinine 0.92 0.6 - 1.3 MG/DL    BUN/Creatinine ratio 20 12 - 20      GFR est AA >60 >60 ml/min/1.73m2    GFR est non-AA 60 (L) >60 ml/min/1.73m2    Calcium 9.6 8.5 - 10.1 MG/DL    Bilirubin, total 0.5 0.2 - 1.0 MG/DL    ALT (SGPT) 22 13 - 56 U/L    AST (SGOT) 16 10 - 38 U/L    Alk. phosphatase 116 45 - 117 U/L    Protein, total 7.0 6.4 - 8.2 g/dL    Albumin 3.8 3.4 - 5.0 g/dL    Globulin 3.2 2.0 - 4.0 g/dL    A-G Ratio 1.2 0.8 - 1.7     T4, FREE   Result Value Ref Range    T4, Free 0.9 0.7 - 1.5 NG/DL   TSH 3RD GENERATION   Result Value Ref Range    TSH 1.24 0.36 - 3.74 uIU/mL       Assessment: #1. Hypertension is controlled, she will continue losartan 25 mg daily and hydrochlorothiazide 25 mg daily. #2 obesity unchanged from a year ago, encouraged her to work harder on weight loss. #3. Hyperlipidemia doing well but not quite as well as usual, she will do the very best she can with weight loss diet and continue simvastatin 20 mg each evening.   #4. Depression that began about a year ago doing very well, total remission, she will continue escitalopram 10 mg daily. #5.  Pain in the right hip area with some tenderness, this started after a fall. We will have her evaluated by Dr. Gorge Kay. #6.  Chronic lumbar pain she has had all of her adult life, no definite difference and she thinks her current hip pain is different from this pain. Follow-up in about 6 months with lab with Dr. Samuel Claudio. Ghulam Levy MD FACP    Please note: This document has been produced using voice recognition software. Unrecognized errors in transcription may be present.

## 2020-07-23 ENCOUNTER — OFFICE VISIT (OUTPATIENT)
Dept: ORTHOPEDIC SURGERY | Facility: CLINIC | Age: 75
End: 2020-07-23

## 2020-07-23 VITALS
TEMPERATURE: 97.2 F | HEIGHT: 64 IN | BODY MASS INDEX: 47.66 KG/M2 | OXYGEN SATURATION: 97 % | HEART RATE: 78 BPM | RESPIRATION RATE: 16 BRPM | WEIGHT: 279.2 LBS | SYSTOLIC BLOOD PRESSURE: 134 MMHG | DIASTOLIC BLOOD PRESSURE: 54 MMHG

## 2020-07-23 DIAGNOSIS — M54.16 LUMBAR RADICULOPATHY: Primary | ICD-10-CM

## 2020-07-23 DIAGNOSIS — M25.551 HIP PAIN, RIGHT: ICD-10-CM

## 2020-07-23 DIAGNOSIS — M54.50 LUMBAR PAIN: ICD-10-CM

## 2020-07-23 DIAGNOSIS — E66.01 OBESITY, MORBID (HCC): ICD-10-CM

## 2020-07-23 DIAGNOSIS — M16.11 PRIMARY OSTEOARTHRITIS OF RIGHT HIP: ICD-10-CM

## 2020-07-23 RX ORDER — BETAMETHASONE SODIUM PHOSPHATE AND BETAMETHASONE ACETATE 3; 3 MG/ML; MG/ML
6 INJECTION, SUSPENSION INTRA-ARTICULAR; INTRALESIONAL; INTRAMUSCULAR; SOFT TISSUE ONCE
Qty: 0.5 ML | Refills: 0
Start: 2020-07-23 | End: 2020-07-23

## 2020-07-23 NOTE — PROGRESS NOTES
Patient: Charleen Thompson                MRN: [id number]       SSN: [id number]  YOB: 1945        AGE: 76 y.o. SEX: female    PCP: Jacqueline Gillis MD  07/23/20    Chief Complaint   Patient presents with    Hip Pain     Right Hip     HISTORY:  Charleen Thompson is a 76 y.o. female who is seen for right hip pain. She has been experiencing right hip pain for the past year. She fell in her yard a year ago when she was cleaning up after a storm. Her pain was previously off and on, but is now constant. She feels pain in her groin and right buttock with standing and walking. Her pain radiates down into her knee. Her hip  stiffens up after she sits for long periods of time. She has a h/o of back pain. She is s/p left subscapularis repair on 4/17 by Dr. Zeeshan Ruth. Pain Assessment  7/23/2020   Location of Pain -   Location Modifiers -   Severity of Pain 1   Quality of Pain Other (Comment)   Duration of Pain A few hours   Frequency of Pain Intermittent   Aggravating Factors Walking;Standing   Limiting Behavior Yes   Relieving Factors Elevation; Other (Comment)   Result of Injury Yes   Work-Related Injury No   Type of Injury Fall     Occupation, etc:  Ms. 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 She lives in [place] with her . She has 1 daughter, 3 granddaughters, 1 great granddaughter and 1 great grandson. She used to make all of her own clothes. She likes to mayra so she made her own coronavirus mask out of yarn. Ms. Taras Porras weighs 279 lbs and is 5'4\" tall.        Lab Results   Component Value Date/Time    Hemoglobin A1c 5.6 11/30/2016 10:21 AM     Weight Metrics 7/23/2020 6/24/2020 1/27/2020 7/22/2019 6/10/2019 12/11/2018 7/30/2018   Weight 279 lb 3.2 oz 219 lb 210 lb 12.8 oz 218 lb 12.8 oz 222 lb 223 lb 6.4 oz 224 lb   BMI 47.92 kg/m2 37.59 kg/m2 36.18 kg/m2 37.56 kg/m2 38.11 kg/m2 38.35 kg/m2 38.45 kg/m2       Patient Active Problem List   Diagnosis Code    Colon adenomas D12.6    Osteopenia M85.80    GERD (gastroesophageal reflux disease) K21.9    Family history of colon cancer Z80.0    Status post gastric bypass for obesity Z98.84    Lumbar spondylosis M47.816    Cervical spondylosis M47.812    Diverticulosis of colon K57.30    Microhematuria R31.29    Abnormal glucose R73.09    Benign paroxysmal positional vertigo H81.10    Multiple pulmonary nodules determined by computed tomography of lung R91.8    Bilateral pulmonary embolism (HCC) I26.99    Cystitis cystica N30.80    Asthma with COPD ([facility] J44.9    Thyroid nodule E04.1    Prediabetes R73.03    Chronic left shoulder pain M25.512, G89.29    Hyperlipidemia LDL goal <100 E78.5    Primary hypertension I10    Obesity (BMI 30-39. 9) E66.9    Sciatica of right side M54.31    Current moderate episode of major depressive disorder without prior episode ([facility] F32.1    Major depression in complete remission ([facility]) F32.5    Pain of right hip joint M25.551     REVIEW OF SYSTEMS:    Constitutional Symptoms: Negative   Eyes: Negative   Ears, Nose, Throat and Mouth: Negative   Cardiovascular: Negative   Respiratory: Negative   Genitourinary: Per HPI   Gastrointestinal: Per HPI   Integumentary (Skin and/or Breast): Negative   Musculoskeletal: Per HPI   Endocrine/Rheumatologic: Negative   Neurological: Per HPI   Hematology/Lymphatic: Negative    Allergic/Immunologic: Negative   Phychiatric: Negative    Social History     Socioeconomic History    Marital status:      Spouse name: Not on file    Number of children: Not on file    Years of education: Not on file    Highest education level: Not on file   Occupational History    Not on file   Social Needs    Financial resource strain: Not on file    Food insecurity     Worry: Not on file     Inability: Not on file    Transportation needs     Medical: Not on file     Non-medical: Not on file   Tobacco Use    Smoking status: Former Smoker     Packs/day: 3.00     Years: 20.00     Pack years: 60.00     Types: Cigarettes     Start date: 1963     Last attempt to quit: 1983     Years since quittin.5    Smokeless tobacco: Never Used   Substance and Sexual Activity    Alcohol use: No     Alcohol/week: 0.0 standard drinks    Drug use: No    Sexual activity: Never   Lifestyle    Physical activity     Days per week: Not on file     Minutes per session: Not on file    Stress: Not on file   Relationships    Social connections     Talks on phone: Not on file     Gets together: Not on file     Attends Church service: Not on file     Active member of club or organization: Not on file     Attends meetings of clubs or organizations: Not on file     Relationship status: Not on file    Intimate partner violence     Fear of current or ex partner: Not on file     Emotionally abused: Not on file     Physically abused: Not on file     Forced sexual activity: Not on file   Other Topics Concern    Not on file   Social History Narrative    Not on file      Allergies   Allergen Reactions    Macrobid [Nitrofurantoin Monohyd/M-Cryst] Anaphylaxis    Aspirin Unknown (comments)     Cannot take due to gastric bypass.  Lisinopril Cough    Parafon Forte Dsc [Chlorzoxazone] Other (comments)     Excessive drowsiness      Current Outpatient Medications   Medication Sig    escitalopram oxalate (LEXAPRO) 10 mg tablet Take 1 Tab by mouth daily.  losartan (COZAAR) 25 mg tablet TAKE 1 TABLET BY MOUTH EVERY DAY    simvastatin (ZOCOR) 20 mg tablet TAKE 1 TABLET BY MOUTH EVERY DAY IN THE EVENING    hydroCHLOROthiazide (HYDRODIURIL) 25 mg tablet TAKE 1 TABLET BY MOUTH EVERY DAY    albuterol (PROVENTIL HFA, VENTOLIN HFA, PROAIR HFA) 90 mcg/actuation inhaler Take 1-2 Puffs by inhalation every four (4) hours as needed for Wheezing.     estradiol (ESTRACE) 0.01 % (0.1 mg/gram) vaginal cream Apply 0.5 grams with fingertip to urethral opening twice weekly.  diazePAM (VALIUM) 2 mg tablet Take 1 Tab by mouth every six (6) hours as needed for Anxiety. Max Daily Amount: 8 mg.  cranberry extract 450 mg tab Take  by mouth.  inhalational spacing device 1 Each by Does Not Apply route as needed.  cholecalciferol, vitamin d3, (VITAMIN D) 1,000 unit tablet Take 1,000 Units by mouth two (2) times a day.  CALCIUM CARBONATE/VITAMIN D3 (OS- + D PO) Take 1 Cap by mouth two (2) times a day.  POLYETHYLENE GLYCOL 3350 (MIRALAX PO) Take  by mouth nightly.  omeprazole (PRILOSEC) 20 mg capsule Take 20 mg by mouth daily.  meclizine (ANTIVERT) 25 mg tablet 1 tablet by mouth every 8 hours as needed for vertigo    ondansetron hcl (ZOFRAN) 4 mg tablet Take 1 Tab by mouth every eight (8) hours as needed.  cyanocobalamin (VITAMIN B-12) 500 mcg tablet Take 500 mcg by mouth daily. No current facility-administered medications for this visit.        PHYSICAL EXAMINATION:  Visit Vitals  /54 (BP 1 Location: Left arm, BP Patient Position: Sitting)   Pulse 78   Temp 97.2 °F (36.2 °C)   Resp 16   Ht 5' 4\" (1.626 m)   Wt 279 lb 3.2 oz (126.6 kg)   SpO2 97%   BMI 47.92 kg/m²      ORTHO EXAMINATION:  Examination Right hip Left hip   Skin Intact Intact   External Rotation ROM 10 20   Internal Rotation ROM 5 10   Trochanteric tenderness + -   Hip flexion contracture - -   Antalgic gait - -   Trendelenberg sign - -   Lumbar tenderness + -   Straight leg raise - -   Calf tenderness - -   Neurovascular Intact Intact      Examination Lumbar Thoracic   Skin Intact Intact   Tenderness + -   Tightness - -   Lordosis Normal N/A   Kyphosis N/A Normal   Scoliosis - -   Flexion Fingertips to ankle N/A   Extension 10 N/A   Knee reflexes Normal N/A   Ankle reflexes Normal N/A   Straight leg raise - N/A   Calf tenderness - N/A        TIME OUT:  Chart reviewed for the following:   Swati THAKKAR MD, have reviewed the History, Physical and updated the Allergic reactions for [address] performed immediately prior to start of procedure:  Hay Swanson MD, have performed the following reviews on Saroj Hollingsworth prior to the start of the procedure:          * Patient was identified by name and date of birth   * Agreement on procedure being performed was verified  * Risks and Benefits explained to the patient  * Procedure site verified and marked as necessary  * Patient was positioned for comfort  * Consent was obtained     Time: 2:20 PM     Date of procedure: 7/23/2020  Procedure performed by:  Marguerite Cervantes MD  Ms. Kenney tolerated the procedure well with no complications. RADIOGRAPHS:  XR RIGHT HIP 7/23/20 ROM[place]  IMPRESSION:  AP pelvis and two views - No fractures, moderate joint space narrowing, no osteophytes present. Tonnis grade 2     IMPRESSION:      ICD-10-CM ICD-9-CM    1. Lumbar radiculopathy  M54.16 724.4 betamethasone (Celestone Soluspan) 6 mg/mL injection      BETAMETHASONE ACETATE & SODIUM PHOSPHATE INJECTION 3 MG EA. INJECT TRIGGER POINT, 1 OR 2   2. Hip pain, right  M25.551 719.45 AMB POC X-RAY RADEX HIP UNI WITH PELVIS 2-3 VIEWS   3. Primary osteoarthritis of right hip  M16.11 715.15    4. Lumbar pain  M54.5 724.2 betamethasone (Celestone Soluspan) 6 mg/mL injection      BETAMETHASONE ACETATE & SODIUM PHOSPHATE INJECTION 3 MG EA. INJECT TRIGGER POINT, 1 OR 2     PLAN:  After discussing treatment options, patient's right iliolumbar region was injected with 4 cc Marcaine and 1/2 cc Celestone. Dietary counseling provided today. Start weight loss with low carb diet and intermittent fasting. She will follow up as needed.       Scribed by Rosa Chambers (Jony Donnelly) as dictated by Marguerite Cervantes MD

## 2020-09-14 RX ORDER — HYDROCHLOROTHIAZIDE 25 MG/1
25 TABLET ORAL DAILY
Qty: 90 TAB | Refills: 1 | Status: SHIPPED | OUTPATIENT
Start: 2020-09-14 | End: 2021-03-11

## 2020-09-14 NOTE — TELEPHONE ENCOUNTER
Last Visit: 6/24/20 with MD Sangita Mckeon  Next Appointment: 1/4/21 with MD Montes  Previous Refill Encounter(s): 9/25/19 #90 with 3 refills    Requested Prescriptions     Pending Prescriptions Disp Refills    hydroCHLOROthiazide (HYDRODIURIL) 25 mg tablet 90 Tab 1     Sig: Take 1 Tab by mouth daily.

## 2020-09-23 NOTE — PROGRESS NOTES
Abel Whitley  1945   Chief Complaint   Patient presents with    Shoulder Pain     right shoulder pain        HISTORY OF PRESENT ILLNESS  Abel Whitley is a 76 y.o. female who presents today for evaluation of right shoulder pain. She rates her pain 3/10 today. Pain has been present for around 1 year after a fall while doing yardwork. Pain has gotten worse. She does report a recent episode of increasing pain this past Saturday, 9/19/2020, when she went to put her mayra supplies in the attic. Pain at night. Has tried taking Tylenol. Has some limited ROM today. Pt is also s/p left shoulder arthroscopic rotator cuff repair and subscapularis repair on 4/19/2017. Patient describes the pain as burning and throbbing that is Constant in nature. Symptoms are worse with stretching and lifting and is better with  Tylenol. Associated symptoms include nothing. Since problem started, it: has worsened. Pain does wake patient up at night. Has taken Tylenol for the problem. Has tried following treatments: Injections:NO; Brace:NO; Therapy:NO; Cane/Crutch:NO       Allergies   Allergen Reactions    Macrobid [Nitrofurantoin Monohyd/M-Cryst] Anaphylaxis    Aspirin Unknown (comments)     Cannot take due to gastric bypass.  Lisinopril Cough    Parafon Forte Dsc [Chlorzoxazone] Other (comments)     Excessive drowsiness        Past Medical History:   Diagnosis Date    Ast[facility] with COPD ([facility] [address] [facility]     Bilateral pulmonary embolism ([facility] [address] 09/2015    Unprovoked. Negative hypercoaguable workup. Completed 6 months of Xarelto.  Cervical spondylosis     Colon adenoma     CTS (carpal tunnel syndrome)     Cystitis cystica 02/2016    Intermittent symptomatic UTIs. Evaluation by Dr. Hayde Del Angel. Negative abdom. CT scan and renal ultrasound. Cystoscopy showing diffuse cystitis cystica.      Family history of colon cancer     GERD (gastroesophageal reflux disease)     Hypertension     Labyrinthitis     Left sided sciatica     Lumbar spondylosis     Menopause     Microhematuria     Multiple pulmonary nodules determined by computed tomography of lung 2016    Bilateral. 3-5 mm in size. Dr. Enmanuel Zambrano.  Osteopenia     Prediabetes     Status post gastric bypass for obesity     Thyroid nodule 2015    Thyroid ultrasound with dominant 2.7 cm solid nodule mid-lower left lobe. Dr. Ayush Bray. FNA with benign cytology.        Social History     Socioeconomic History    Marital status:      Spouse name: Not on file    Number of children: Not on file    Years of education: Not on file    Highest education level: Not on file   Occupational History    Not on file   Social Needs    Financial resource strain: Not on file    Food insecurity     Worry: Not on file     Inability: Not on file    Transportation needs     Medical: Not on file     Non-medical: Not on file   Tobacco Use    Smoking status: Former Smoker     Packs/day: 3.00     Years: 20.00     Pack years: 60.00     Types: Cigarettes     Start date: 1963     Last attempt to quit: 1983     Years since quittin.7    Smokeless tobacco: Never Used   Substance and Sexual Activity    Alcohol use: No     Alcohol/week: 0.0 standard drinks    Drug use: No    Sexual activity: Never   Lifestyle    Physical activity     Days per week: Not on file     Minutes per session: Not on file    Stress: Not on file   Relationships    Social connections     Talks on phone: Not on file     Gets together: Not on file     Attends [employer] service: Not on file     Active member of club or organization: Not on file     Attends meetings of clubs or organizations: Not on file     Relationship status: Not on file    Intimate partner violence     Fear of current or ex partner: Not on file     Emotionally abused: Not on file     Physically abused: Not on file     Forced sexual activity: Not on file   Other Topics Concern    Not on file   Social History Narrative    Not on file Past Surgical History:   Procedure Laterality Date    APPENDECTOMY      HX APPENDECTOMY      HX CARPAL TUNNEL RELEASE      twice on left, once on right    HX CHOLECYSTECTOMY      HX GASTRIC BYPASS      HX GASTRIC BYPASS      HX HEMORRHOIDECTOMY      HX HERNIA REPAIR      HX HYSTERECTOMY      HX SHOULDER ARTHROSCOPY Left     LAP,CHOLECYSTECTOMY        Family History   Problem Relation Age of Onset    Other Mother         Vascular disease    Cancer Mother     Lung Cancer Mother 80    Colon Cancer Father 28    Cancer Father     Other Sister         Gout    Hypertension Sister     Diabetes Brother     Breast Cancer Other         Grandmother    Breast Cancer Maternal Grandmother       Current Outpatient Medications   Medication Sig    triamcinolone acetonide (Kenalog) 40 mg/mL injection 1 mL by IntraMUSCular route once for 1 dose.  hydroCHLOROthiazide (HYDRODIURIL) 25 mg tablet Take 1 Tab by mouth daily.  escitalopram oxalate (LEXAPRO) 10 mg tablet Take 1 Tab by mouth daily.  losartan (COZAAR) 25 mg tablet TAKE 1 TABLET BY MOUTH EVERY DAY    meclizine (ANTIVERT) 25 mg tablet 1 tablet by mouth every 8 hours as needed for vertigo    simvastatin (ZOCOR) 20 mg tablet TAKE 1 TABLET BY MOUTH EVERY DAY IN THE EVENING    albuterol (PROVENTIL HFA, VENTOLIN HFA, PROAIR HFA) 90 mcg/actuation inhaler Take 1-2 Puffs by inhalation every four (4) hours as needed for Wheezing.  estradiol (ESTRACE) 0.01 % (0.1 mg/gram) vaginal cream Apply 0.5 grams with fingertip to urethral opening twice weekly.  diazePAM (VALIUM) 2 mg tablet Take 1 Tab by mouth every six (6) hours as needed for Anxiety. Max Daily Amount: 8 mg.  ondansetron hcl (ZOFRAN) 4 mg tablet Take 1 Tab by mouth every eight (8) hours as needed.  cranberry extract 450 mg tab Take  by mouth.  inhalational spacing device 1 Each by Does Not Apply route as needed.     cholecalciferol, vitamin d3, (VITAMIN D) 1,000 unit tablet Take 1,000 Units by mouth two (2) times a day.  cyanocobalamin (VITAMIN B-12) 500 mcg tablet Take 500 mcg by mouth daily.  CALCIUM CARBONATE/VITAMIN D3 (OS- + D PO) Take 1 Cap by mouth two (2) times a day.  POLYETHYLENE GLYCOL 3350 (MIRALAX PO) Take  by mouth nightly.  omeprazole (PRILOSEC) 20 mg capsule Take 20 mg by mouth daily. No current facility-administered medications for this visit. REVIEW OF SYSTEM   Patient denies: Weight loss, Fever/Chills, HA, Visual changes, Fatigue, Chest pain, SOB, Abdominal pain, N/V/D/C, Blood in stool or urine, Edema. Pertinent positive as above in HPI. All others were negative    PHYSICAL EXAM:   Visit Vitals  BP (!) 144/82 (BP 1 Location: Left arm, BP Patient Position: Sitting)   Pulse 77   Temp 97.5 °F (36.4 °C) (Temporal)   Ht 5' 4\" (1.626 m)   Wt 220 lb (99.8 kg)   SpO2 96%   BMI 37.76 kg/m²     The patient is a well-developed, well-nourished female   in no acute distress. The patient is alert and oriented times three. The patient is alert and oriented times three. Mood and affect are normal.  LYMPHATIC: lymph nodes are not enlarged and are within normal limits  SKIN: normal in color and non tender to palpation. There are no bruises or abrasions noted. NEUROLOGICAL: Motor sensory exam is within normal limits. Reflexes are equal bilaterally.  There is normal sensation to pinprick and light touch  MUSCULOSKELETAL:   Examination Right shoulder   Skin Intact   AC joint tenderness -   Biceps tenderness -   Forward flexion/Elevation    Active abduction    Glenohumeral abduction 90   External rotation ROM 45   Internal rotation ROM 30   Apprehension -   Carolees Relocation -   Jerk -   Load and Shift -   Obriens -   Speeds -   Impingement sign +   Supraspinatus/Empty Can +   External Rotation Strength -, 5/5   Lift Off/Belly Press -, 5/5   Neurovascular Intact     PROCEDURE: Right Shoulder Injection with Ultrasound Guidance  Indication:Right Shoulder pain/swelling    After sterile prep, 6 cc of Xylocaine and 1 cc of Kenalog were injected into the right shoulder. Ultrasound images captured using [address] Ultrasound machine and scanned into patient's chart. [facility]  OFFICE PROCEDURE PROGRESS NOTE        Chart reviewed for the following:  Alise Puga M.D, have reviewed the History, Physical and updated the Allergic reactions for [address] performed immediately prior to start of procedure:  Alise Puga M.D, have performed the following reviews on Esthela Treadwell prior to the start of the procedure:            * Patient was identified by name and date of birth   * Agreement on procedure being performed was verified  * Risks and Benefits explained to the patient  * Procedure site verified and marked as necessary  * Patient was positioned for comfort  * Consent was signed and verified     Time: 10:25 AM     Date of procedure: 9/24/2020    Procedure performed by:  Nayan Osei M.D    Provider assisted by: (see medication administration)    How tolerated by patient: tolerated the procedure well with no complications    Comments: none      IMAGING: XR of right shoulder obtained in the office dated 9/24/2020 was reviewed and read by Dr. Darrius Escamilla: no acute abnormalities      IMPRESSION:      ICD-10-CM ICD-9-CM    1. Tear of right rotator cuff, unspecified tear extent, unspecified whether traumatic  M75.101 840.4 MRI SHOULDER RT WO CONT      TRIAMCINOLONE ACETONIDE INJ      triamcinolone acetonide (Kenalog) 40 mg/mL injection      US GUIDE INJ/ASP/ARTHRO LG JNT/BURSA   2. Chronic right shoulder pain  M25.511 719.41 AMB POC XRAY, SHOULDER; COMPLETE, 2+    G89.29 338.29         PLAN:  1. Pt presents today with right shoulder pain and I am ordering an MRI to r/o a RCT. Given her level of pain, I would also like to try a cortisone injection today.   Risk factors include: prediabetes, htn, no NSAIDs, BMI>35  2. No ultrasound exam indicated today  3. Yes cortisone injection indicated today R SHOULDER US  4. No Physical/Occupational Therapy indicated today  5. Yes diagnostic test indicated today: MRI R SHOULDER  6. No durable medical equipment indicated today  7. No referral indicated today   8. No medications indicated today:   9. No Narcotic indicated today       RTC following MRI      Scribed by Gris Romano) as dictated by Meliza Hanley MD    I, Dr. Meliza Hanley, confirm that all documentation is accurate.     Meliza Hanley M.D.   [facility]

## 2020-09-24 ENCOUNTER — OFFICE VISIT (OUTPATIENT)
Dept: ORTHOPEDIC SURGERY | Age: 75
End: 2020-09-24
Payer: MEDICARE

## 2020-09-24 VITALS
BODY MASS INDEX: 37.56 KG/M2 | OXYGEN SATURATION: 96 % | DIASTOLIC BLOOD PRESSURE: 82 MMHG | SYSTOLIC BLOOD PRESSURE: 144 MMHG | WEIGHT: 220 LBS | HEART RATE: 77 BPM | HEIGHT: 64 IN | TEMPERATURE: 97.5 F

## 2020-09-24 DIAGNOSIS — G89.29 CHRONIC RIGHT SHOULDER PAIN: ICD-10-CM

## 2020-09-24 DIAGNOSIS — M75.101 TEAR OF RIGHT ROTATOR CUFF, UNSPECIFIED TEAR EXTENT, UNSPECIFIED WHETHER TRAUMATIC: Primary | ICD-10-CM

## 2020-09-24 DIAGNOSIS — M25.511 CHRONIC RIGHT SHOULDER PAIN: ICD-10-CM

## 2020-09-24 PROCEDURE — G8753 SYS BP > OR = 140: HCPCS | Performed by: ORTHOPAEDIC SURGERY

## 2020-09-24 PROCEDURE — G8417 CALC BMI ABV UP PARAM F/U: HCPCS | Performed by: ORTHOPAEDIC SURGERY

## 2020-09-24 PROCEDURE — 99214 OFFICE O/P EST MOD 30 MIN: CPT | Performed by: ORTHOPAEDIC SURGERY

## 2020-09-24 PROCEDURE — 1101F PT FALLS ASSESS-DOCD LE1/YR: CPT | Performed by: ORTHOPAEDIC SURGERY

## 2020-09-24 PROCEDURE — G8754 DIAS BP LESS 90: HCPCS | Performed by: ORTHOPAEDIC SURGERY

## 2020-09-24 PROCEDURE — 20611 DRAIN/INJ JOINT/BURSA W/US: CPT | Performed by: ORTHOPAEDIC SURGERY

## 2020-09-24 PROCEDURE — 1090F PRES/ABSN URINE INCON ASSESS: CPT | Performed by: ORTHOPAEDIC SURGERY

## 2020-09-24 PROCEDURE — G9717 DOC PT DX DEP/BP F/U NT REQ: HCPCS | Performed by: ORTHOPAEDIC SURGERY

## 2020-09-24 PROCEDURE — G8536 NO DOC ELDER MAL SCRN: HCPCS | Performed by: ORTHOPAEDIC SURGERY

## 2020-09-24 PROCEDURE — 3017F COLORECTAL CA SCREEN DOC REV: CPT | Performed by: ORTHOPAEDIC SURGERY

## 2020-09-24 PROCEDURE — G8427 DOCREV CUR MEDS BY ELIG CLIN: HCPCS | Performed by: ORTHOPAEDIC SURGERY

## 2020-09-24 PROCEDURE — 73030 X-RAY EXAM OF SHOULDER: CPT | Performed by: ORTHOPAEDIC SURGERY

## 2020-09-24 PROCEDURE — G8399 PT W/DXA RESULTS DOCUMENT: HCPCS | Performed by: ORTHOPAEDIC SURGERY

## 2020-09-24 RX ORDER — TRIAMCINOLONE ACETONIDE 40 MG/ML
40 INJECTION, SUSPENSION INTRA-ARTICULAR; INTRAMUSCULAR ONCE
Qty: 1 ML | Refills: 0
Start: 2020-09-24 | End: 2020-09-24

## 2020-09-25 DIAGNOSIS — M75.101 TEAR OF RIGHT ROTATOR CUFF, UNSPECIFIED TEAR EXTENT, UNSPECIFIED WHETHER TRAUMATIC: ICD-10-CM

## 2020-10-09 ENCOUNTER — HOSPITAL ENCOUNTER (OUTPATIENT)
Age: 75
Discharge: HOME | End: 2020-10-09
Attending: ORTHOPAEDIC SURGERY
Payer: COMMERCIAL

## 2020-10-09 PROCEDURE — 73221 MRI JOINT UPR EXTREM W/O DYE: CPT

## 2020-10-14 ENCOUNTER — OFFICE VISIT (OUTPATIENT)
Dept: ORTHOPEDIC SURGERY | Age: 75
End: 2020-10-14
Payer: COMMERCIAL

## 2020-10-14 VITALS
HEART RATE: 71 BPM | TEMPERATURE: 97.5 F | DIASTOLIC BLOOD PRESSURE: 59 MMHG | WEIGHT: 221 LBS | OXYGEN SATURATION: 97 % | BODY MASS INDEX: 37.73 KG/M2 | HEIGHT: 64 IN | RESPIRATION RATE: 16 BRPM | SYSTOLIC BLOOD PRESSURE: 139 MMHG

## 2020-10-14 DIAGNOSIS — M12.811 ROTATOR CUFF ARTHROPATHY, RIGHT: Primary | ICD-10-CM

## 2020-10-14 PROCEDURE — G8752 SYS BP LESS 140: HCPCS | Performed by: ORTHOPAEDIC SURGERY

## 2020-10-14 PROCEDURE — G8536 NO DOC ELDER MAL SCRN: HCPCS | Performed by: ORTHOPAEDIC SURGERY

## 2020-10-14 PROCEDURE — G8754 DIAS BP LESS 90: HCPCS | Performed by: ORTHOPAEDIC SURGERY

## 2020-10-14 PROCEDURE — 1090F PRES/ABSN URINE INCON ASSESS: CPT | Performed by: ORTHOPAEDIC SURGERY

## 2020-10-14 PROCEDURE — G8399 PT W/DXA RESULTS DOCUMENT: HCPCS | Performed by: ORTHOPAEDIC SURGERY

## 2020-10-14 PROCEDURE — G8417 CALC BMI ABV UP PARAM F/U: HCPCS | Performed by: ORTHOPAEDIC SURGERY

## 2020-10-14 PROCEDURE — 1101F PT FALLS ASSESS-DOCD LE1/YR: CPT | Performed by: ORTHOPAEDIC SURGERY

## 2020-10-14 PROCEDURE — 99213 OFFICE O/P EST LOW 20 MIN: CPT | Performed by: ORTHOPAEDIC SURGERY

## 2020-10-14 PROCEDURE — 3017F COLORECTAL CA SCREEN DOC REV: CPT | Performed by: ORTHOPAEDIC SURGERY

## 2020-10-14 PROCEDURE — G9717 DOC PT DX DEP/BP F/U NT REQ: HCPCS | Performed by: ORTHOPAEDIC SURGERY

## 2020-10-14 PROCEDURE — G8427 DOCREV CUR MEDS BY ELIG CLIN: HCPCS | Performed by: ORTHOPAEDIC SURGERY

## 2020-10-14 NOTE — PROGRESS NOTES
Bell Walter  1945   Chief Complaint   Patient presents with    Shoulder Pain     right shoulder pain        HISTORY OF PRESENT ILLNESS  Bell Walter is a 76 y.o. female who presents today for reevaluation of right shoulder and MRI review. Patient rates pain as 1/10 today. Pain has been present for around 1 year after a fall while doing yardwork. She does report a recent episode of increasing pain on 9/19/2020 when she went to put her mayra supplies in the attic. At last OV on 9/24/2020, patient had a right shoulder cortisone injection which provided relief. Has tried taking Tylenol. Pt is also s/p left shoulder arthroscopic rotator cuff repair and subscapularis repair on 4/19/2017. Patient denies any fever, chills, chest pain, shortness of breath or calf pain. The remainder of the review of systems is negative. There are no new illness or injuries to report since last seen in the office. There are no changes to medications, allergies, family or social history. Pain Assessment  10/14/2020   Location of Pain Shoulder   Location Modifiers Right   Severity of Pain 1   Quality of Pain Burning;Aching;Dull; Thang [place]; Throbbing   Duration of Pain A few hours   Frequency of Pain Intermittent   Date Pain First Started -   Aggravating Factors (No Data)   Aggravating Factors Comment ROM   Limiting Behavior -   Relieving Factors Nothing   Relieving Factors Comment -   Result of Injury No   Work-Related Injury -   Type of Injury -       PHYSICAL EXAM:   Visit Vitals  BP (!) 139/59 (BP 1 Location: Left arm, BP Patient Position: Sitting)   Pulse 71   Temp 97.5 °F (36.4 °C) (Skin)   Resp 16   Ht 5' 4\" (1.626 m)   Wt 221 lb (100.2 kg)   SpO2 97%   BMI 37.93 kg/m²     The patient is a well-developed, well-nourished female   in no acute distress. The patient is alert and oriented times three. The patient is alert and oriented times three.  Mood and affect are normal.  LYMPHATIC: lymph nodes are not enlarged and are within normal limits  SKIN: normal in color and non tender to palpation. There are no bruises or abrasions noted. NEUROLOGICAL: Motor sensory exam is within normal limits. Reflexes are equal bilaterally. There is normal sensation to pinprick and light touch  MUSCULOSKELETAL:  Examination Right shoulder   Skin Intact   AC joint tenderness -   Biceps tenderness -   Forward flexion/Elevation    Active abduction    Glenohumeral abduction 90   External rotation ROM 45   Internal rotation ROM 30   Apprehension -   Carolees Relocation -   Jerk -   Load and Shift -   Obriens -   Speeds -   Impingement sign +   Supraspinatus/Empty Can +   External Rotation Strength -, 5/5   Lift Off/Belly Press -, 5/5   Neurovascular Intact        IMAGING: MRI of right shoulder dated 10/09/2020 was reviewed and read by Dr. Mary Beth Teixeira:   IMPRESSION:  Supraspinatus with high-grade partial-thickness tear involving the articular surface fibers at the insertion with underlying advanced tendinopathy, without significant atrophy.  -Infraspinatus also with severe tendinopathy and high-grade partial-thickness articular surface tear, without significant atrophy.  -Notable acromioclavicular findings which may cause subacromial impingement described above. Small amount of subacromial subdeltoid bursal fluid. Long head biceps tendon not visualized, likely completely ruptured. -Superior and anterior labrum degeneration/tearing.  -Subscapularis superior insertion tendinopathy without definite tear. Patchy high-grade to full-thickness cartilage loss in the mid and anterior glenoid. Minimal rotator interval signal abnormality, nonspecific, but can be associated with adhesive capsulitis. XR of right shoulder obtained in the office dated 9/24/2020 was reviewed and read by Dr. Mary Beth Teixeira: no acute abnormalities        IMPRESSION:      ICD-10-CM ICD-9-CM    1. Rotator cuff arthropathy, right  M12.811 716.81         PLAN:   1.  Pt presents today with right shoulder pain due to MRI-documented rotator cuff arthropathy and the cortisone injection given at last OV did provide some relief. She can return as needed. Risk factors include: prediabetes, htn, no NSAIDs, BMI>35  2. No ultrasound exam indicated today  3. No cortisone injection indicated today   4. No Physical/Occupational Therapy indicated today  5. No diagnostic test indicated today:   6. No durable medical equipment indicated today  7. No referral indicated today   8. No medications indicated today:   9. No Narcotic indicated today      RTC prn      Scribed by [address]) as dictated by Martinez Jones MD    I, Dr. Martinez Jones, confirm that all documentation is accurate.     Martinez Jones M.D.   [facility]

## 2020-11-18 RX ORDER — SIMVASTATIN 20 MG/1
20 TABLET, FILM COATED ORAL EVERY EVENING
Qty: 90 TAB | Refills: 0 | Status: SHIPPED | OUTPATIENT
Start: 2020-11-18 | End: 2021-02-10

## 2020-11-18 NOTE — TELEPHONE ENCOUNTER
Last Visit: 6/24/20 with MD Rajan Pena  Next Appointment: 1/4/21 with MD Montes  Previous Refill Encounter(s): 12/2/19 #90 with 3 refills    Requested Prescriptions     Pending Prescriptions Disp Refills    simvastatin (ZOCOR) 20 mg tablet 90 Tab 0     Sig: Take 1 Tab by mouth every evening.

## 2020-11-25 DIAGNOSIS — R05.3 PERSISTENT COUGH: ICD-10-CM

## 2020-11-25 RX ORDER — ALBUTEROL SULFATE 90 UG/1
1-2 AEROSOL, METERED RESPIRATORY (INHALATION)
Qty: 1 INHALER | Refills: 3 | Status: SHIPPED | OUTPATIENT
Start: 2020-11-25

## 2020-11-25 NOTE — TELEPHONE ENCOUNTER
Last visit:6/24/20  Next visit:12/28/20(Nurse visit)  Previous refill 7/22/19(1 inhaler + 3R)    Requested Prescriptions     Pending Prescriptions Disp Refills    albuterol (PROVENTIL HFA, VENTOLIN HFA, PROAIR HFA) 90 mcg/actuation inhaler 1 Inhaler 3     Sig: Take 1-2 Puffs by inhalation every four (4) hours as needed for Wheezing.

## 2020-12-28 ENCOUNTER — HOSPITAL ENCOUNTER (OUTPATIENT)
Dept: LAB | Age: 75
Discharge: HOME | End: 2020-12-28
Payer: COMMERCIAL

## 2020-12-28 ENCOUNTER — APPOINTMENT (OUTPATIENT)
Dept: INTERNAL MEDICINE CLINIC | Age: 75
End: 2020-12-28

## 2020-12-28 DIAGNOSIS — E78.5 HYPERLIPIDEMIA LDL GOAL <100: ICD-10-CM

## 2020-12-28 LAB
ALBUMIN SERPL-MCNC: 3.5 G/DL (ref 3.4–5)
ALBUMIN/GLOB SERPL: 1.2 {RATIO} (ref 0.8–1.7)
ALP SERPL-CCNC: 106 U/L (ref 45–117)
ALT SERPL-CCNC: 20 U/L (ref 13–56)
ANION GAP SERPL CALC-SCNC: 2 MMOL/L (ref 3–18)
AST SERPL-CCNC: 12 U/L (ref 10–38)
BILIRUB SERPL-MCNC: 0.4 MG/DL (ref 0.2–1)
BUN SERPL-MCNC: 16 MG/DL (ref 7–18)
BUN/CREAT SERPL: 16 (ref 12–20)
CALCIUM SERPL-MCNC: 9.5 MG/DL (ref 8.5–10.1)
CHLORIDE SERPL-SCNC: 107 MMOL/L (ref 100–111)
CHOLEST SERPL-MCNC: 158 MG/DL
CO2 SERPL-SCNC: 34 MMOL/L (ref 21–32)
CREAT SERPL-MCNC: 0.98 MG/DL (ref 0.6–1.3)
GLOBULIN SER CALC-MCNC: 3 G/DL (ref 2–4)
GLUCOSE SERPL-MCNC: 94 MG/DL (ref 74–99)
HDLC SERPL-MCNC: 46 MG/DL (ref 40–60)
HDLC SERPL: 3.4 {RATIO} (ref 0–5)
LDLC SERPL CALC-MCNC: 55.4 MG/DL (ref 0–100)
LIPID PROFILE,FLP: ABNORMAL
POTASSIUM SERPL-SCNC: 4.6 MMOL/L (ref 3.5–5.5)
PROT SERPL-MCNC: 6.5 G/DL (ref 6.4–8.2)
SODIUM SERPL-SCNC: 143 MMOL/L (ref 136–145)
TRIGL SERPL-MCNC: 283 MG/DL (ref ?–150)
VLDLC SERPL CALC-MCNC: 56.6 MG/DL

## 2020-12-28 PROCEDURE — 36415 COLL VENOUS BLD VENIPUNCTURE: CPT

## 2020-12-28 PROCEDURE — 80053 COMPREHEN METABOLIC PANEL: CPT

## 2020-12-28 PROCEDURE — 80061 LIPID PANEL: CPT

## 2021-01-04 ENCOUNTER — OFFICE VISIT (OUTPATIENT)
Dept: INTERNAL MEDICINE CLINIC | Age: 76
End: 2021-01-04
Payer: COMMERCIAL

## 2021-01-04 VITALS
WEIGHT: 223 LBS | DIASTOLIC BLOOD PRESSURE: 57 MMHG | BODY MASS INDEX: 38.07 KG/M2 | RESPIRATION RATE: 20 BRPM | OXYGEN SATURATION: 95 % | HEART RATE: 73 BPM | SYSTOLIC BLOOD PRESSURE: 154 MMHG | HEIGHT: 64 IN | TEMPERATURE: 97.2 F

## 2021-01-04 DIAGNOSIS — G43.909 MIGRAINE WITHOUT STATUS MIGRAINOSUS, NOT INTRACTABLE, UNSPECIFIED MIGRAINE TYPE: ICD-10-CM

## 2021-01-04 DIAGNOSIS — H81.13 BENIGN PAROXYSMAL POSITIONAL VERTIGO DUE TO BILATERAL VESTIBULAR DISORDER: Primary | ICD-10-CM

## 2021-01-04 DIAGNOSIS — E78.5 HYPERLIPIDEMIA LDL GOAL <100: ICD-10-CM

## 2021-01-04 DIAGNOSIS — J43.2 CENTRILOBULAR EMPHYSEMA (HCC): ICD-10-CM

## 2021-01-04 DIAGNOSIS — I10 PRIMARY HYPERTENSION: ICD-10-CM

## 2021-01-04 DIAGNOSIS — E04.9 GOITER: ICD-10-CM

## 2021-01-04 DIAGNOSIS — E66.01 CLASS 2 SEVERE OBESITY DUE TO EXCESS CALORIES WITH SERIOUS COMORBIDITY AND BODY MASS INDEX (BMI) OF 38.0 TO 38.9 IN ADULT (HCC): ICD-10-CM

## 2021-01-04 DIAGNOSIS — F32.5 MAJOR DEPRESSION IN COMPLETE REMISSION (HCC): ICD-10-CM

## 2021-01-04 PROCEDURE — G8399 PT W/DXA RESULTS DOCUMENT: HCPCS | Performed by: INTERNAL MEDICINE

## 2021-01-04 PROCEDURE — G8753 SYS BP > OR = 140: HCPCS | Performed by: INTERNAL MEDICINE

## 2021-01-04 PROCEDURE — 99214 OFFICE O/P EST MOD 30 MIN: CPT | Performed by: INTERNAL MEDICINE

## 2021-01-04 PROCEDURE — G9717 DOC PT DX DEP/BP F/U NT REQ: HCPCS | Performed by: INTERNAL MEDICINE

## 2021-01-04 PROCEDURE — G8536 NO DOC ELDER MAL SCRN: HCPCS | Performed by: INTERNAL MEDICINE

## 2021-01-04 PROCEDURE — G8417 CALC BMI ABV UP PARAM F/U: HCPCS | Performed by: INTERNAL MEDICINE

## 2021-01-04 PROCEDURE — G8754 DIAS BP LESS 90: HCPCS | Performed by: INTERNAL MEDICINE

## 2021-01-04 PROCEDURE — G8427 DOCREV CUR MEDS BY ELIG CLIN: HCPCS | Performed by: INTERNAL MEDICINE

## 2021-01-04 PROCEDURE — 3017F COLORECTAL CA SCREEN DOC REV: CPT | Performed by: INTERNAL MEDICINE

## 2021-01-04 PROCEDURE — 1101F PT FALLS ASSESS-DOCD LE1/YR: CPT | Performed by: INTERNAL MEDICINE

## 2021-01-04 PROCEDURE — 1090F PRES/ABSN URINE INCON ASSESS: CPT | Performed by: INTERNAL MEDICINE

## 2021-01-04 RX ORDER — UMECLIDINIUM BROMIDE AND VILANTEROL TRIFENATATE 62.5; 25 UG/1; UG/1
1 POWDER RESPIRATORY (INHALATION) DAILY
Qty: 1 INHALER | Refills: 2 | Status: SHIPPED | OUTPATIENT
Start: 2021-01-04 | End: 2021-03-31

## 2021-01-04 RX ORDER — DIAZEPAM 2 MG/1
2 TABLET ORAL
Qty: 30 TAB | Refills: 0 | Status: CANCELLED | OUTPATIENT
Start: 2021-01-04

## 2021-01-04 RX ORDER — MECLIZINE HYDROCHLORIDE 25 MG/1
TABLET ORAL
Qty: 60 TAB | Refills: 4 | Status: SHIPPED | OUTPATIENT
Start: 2021-01-04 | End: 2022-02-01

## 2021-01-04 NOTE — PROGRESS NOTES
Subjective:       Chief Complaint  The patient presents for follow up of hypertension and high cholesterol. Depression         HPI  Dariana Subramanian is a 76 y.o. female seen for follow up of hyperlipidemia. Shealessandrao has hypertension. Hypertension no significant medication side effects noted, borderline controlled, on HCTZ 25 mg and Cozaar 25 mg, hyperlipidemia well controlled, no significant medication side effects noted, on Zocor 20 mg. Diet and Lifestyle: generally follows a low fat low cholesterol diet, sedentary    Home BP Monitoring: pt will try to monitor at home on a more regular basis . Other symptoms and concerns: Patient has a history of depression for which she uses Lexapro 10 mg daily with good improvement. COPD  Patient complains of dyspnea. Symptoms began several years ago. Symptoms chronic dyspnea: severity = moderate: course of sx: gradually worsening does worsen with exertion. Sputum is clear in moderate amounts. Patient uses 1 pillows at night. Patient can walk 100 feet before resting. Patient currently is not on home oxygen therapy. [place] Respiratory history: history of 60 pack years tobacco use stopping 1983. Pt currently just using albuterol    Vertigo  Patient complains of rotary vertigo, unsteadiness. The symptoms started several years ago and are unchanged. The attacks occur every several hours and last 5minutes. Positions that worsen symptoms: turning head. Previous workup/treatments: vestibular therapy/ENT. Associated ear symptoms: none. Associated CNS symptoms: none. Recent infections: none. Head trauma: denied. Drug ingestion: none Noise exposure: no occupational exposure. Family history: non-contributory    Patient has a history of goiter for which she has seen endocrine in the past but has not had any change in the size. Discussed the patient's BMI with her. The BMI follow up plan is as follows: I have counseled this patient on diet and exercise regimens.   Patient already had a gastric bypass in the past.      Current Outpatient Medications   Medication Sig    meclizine (ANTIVERT) 25 mg tablet 1 tablet by mouth every 8 hours as needed for vertigo    umeclidinium-vilanteroL (Anoro Ellipta) 62.5-25 mcg/actuation inhaler Take 1 Puff by inhalation daily.  albuterol (PROVENTIL HFA, VENTOLIN HFA, PROAIR HFA) 90 mcg/actuation inhaler Take 1-2 Puffs by inhalation every four (4) hours as needed for Wheezing.  simvastatin (ZOCOR) 20 mg tablet Take 1 Tab by mouth every evening.  hydroCHLOROthiazide (HYDRODIURIL) 25 mg tablet Take 1 Tab by mouth daily.  escitalopram oxalate (LEXAPRO) 10 mg tablet Take 1 Tab by mouth daily.  losartan (COZAAR) 25 mg tablet TAKE 1 TABLET BY MOUTH EVERY DAY    ondansetron hcl (ZOFRAN) 4 mg tablet Take 1 Tab by mouth every eight (8) hours as needed.  cranberry extract 450 mg tab Take  by mouth.  inhalational spacing device 1 Each by Does Not Apply route as needed.  cholecalciferol, vitamin d3, (VITAMIN D) 1,000 unit tablet Take 1,000 Units by mouth two (2) times a day.  CALCIUM CARBONATE/VITAMIN D3 (OS- + D PO) Take 1 Cap by mouth two (2) times a day.  POLYETHYLENE GLYCOL 3350 (MIRALAX PO) Take  by mouth nightly.  omeprazole (PRILOSEC) 20 mg capsule Take 20 mg by mouth daily.  estradiol (ESTRACE) 0.01 % (0.1 mg/gram) vaginal cream Apply 0.5 grams with fingertip to urethral opening twice weekly.  cyanocobalamin (VITAMIN B-12) 500 mcg tablet Take 500 mcg by mouth daily. No current facility-administered medications for this visit.               Review of Systems  Respiratory: negative for dyspnea on exertion  Cardiovascular: negative for chest pain    Objective:     Visit Vitals  BP (!) 154/57 (BP 1 Location: Left arm, BP Patient Position: Sitting)   Pulse 73   Temp 97.2 °F (36.2 °C) (Temporal)   Resp 20   Ht 5' 4\" (1.626 m)   Wt 223 lb (101.2 kg)   SpO2 95%   BMI 38.28 kg/m²        Eyes - pupils equal and reactive, extraocular eye movements intact  Ears - bilateral TM's and external ear canals normal  Chest - clear to auscultation, no wheezes, rales or rhonchi, symmetric air entry  Heart - normal rate, regular rhythm, normal S1, S2, no murmurs, rubs, clicks or gallops  Extremities - peripheral pulses normal, no pedal edema, no clubbing or cyanosis      Labs:   Lab Results   Component Value Date/Time    Cholesterol, total 158 12/28/2020 08:52 AM    HDL Cholesterol 46 12/28/2020 08:52 AM    LDL, calculated 55.4 12/28/2020 08:52 AM    Triglyceride 283 (H) 12/28/2020 08:52 AM    CHOL/HDL Ratio 3.4 12/28/2020 08:52 AM     Lab Results   Component Value Date/Time    Sodium 143 12/28/2020 08:52 AM    Potassium 4.6 12/28/2020 08:52 AM    Chloride 107 12/28/2020 08:52 AM    CO2 34 (H) 12/28/2020 08:52 AM    Anion gap 2 (L) 12/28/2020 08:52 AM    Glucose 94 12/28/2020 08:52 AM    BUN 16 12/28/2020 08:52 AM    Creatinine 0.98 12/28/2020 08:52 AM    BUN/Creatinine ratio 16 12/28/2020 08:52 AM    GFR est AA >60 12/28/2020 08:52 AM    GFR est non-AA 55 (L) 12/28/2020 08:52 AM    Calcium 9.5 12/28/2020 08:52 AM    Bilirubin, total 0.4 12/28/2020 08:52 AM    ALT (SGPT) 20 12/28/2020 08:52 AM    Alk. phosphatase 106 12/28/2020 08:52 AM    Protein, total 6.5 12/28/2020 08:52 AM    Albumin 3.5 12/28/2020 08:52 AM    Globulin 3.0 12/28/2020 08:52 AM    A-G Ratio 1.2 12/28/2020 08:52 AM      Lab Results   Component Value Date/Time    Hemoglobin A1c 5.6 11/30/2016 10:21 AM            Assessment / Plan     Hypertension borderline controlled, on Cozaar 25 mg and HCTZ 25 mg, patient to monitor blood pressure at home  Hyperlipidemia well controlled, on Zocor 20 mg daily. ICD-10-CM ICD-9-CM    1. Benign paroxysmal positional vertigo due to bilateral vestibular disorder  H81.13 386.11 Will continue meclizine (ANTIVERT) 25 mg tablet      REFERRAL TO ENT-OTOLARYNGOLOGY for further management    2.  Hyperlipidemia LDL goal <100  E78.5 272.4 LIPID PANEL 3. Primary hypertension  C62 954.2 METABOLIC PANEL, COMPREHENSIVE   4. Major depression in complete remission (HCC)  F32.5 296.26  well-controlled on Lexapro 10 mg daily   5. Migraine without status migrainosus, not intractable, unspecified migraine type  G43.909 346.90  controlled currently with over-the-counter medication   6. Goiter  E04.9 240.9    7. Centrilobular emphysema (HCC)  J43.2 492.8  uncontrolled we will try on umeclidinium-vilanteroL (Anoro Ellipta) 62.5-25 mcg/actuation inhaler   8. Class 2 severe obesity due to excess calories with serious comorbidity and body mass index (BMI) of 38.0 to 38.9 in adult [facility])  E66.01 278.01  patient will continue to work on trying to lose weight by cutting back starches and sweets in her diet    Z68.38 V85.38              Low cholesterol diet, weight control and daily exercise discussed. Follow-up and Dispositions    · Return in about 3 months (around 4/4/2021) for labs 1 week before. Reviewed plan of care. Patient has provided input and agrees with goals.

## 2021-01-04 NOTE — PATIENT INSTRUCTIONS
High Blood Pressure: Care Instructions Overview It's normal for blood pressure to go up and down throughout the day. But if it stays up, you have high blood pressure. Another name for high blood pressure is hypertension. Despite what a lot of people think, high blood pressure usually doesn't cause headaches or make you feel dizzy or lightheaded. It usually has no symptoms. But it does increase your risk of stroke, heart attack, and other problems. You and your doctor will talk about your risks of these problems based on your blood pressure. Your doctor will give you a goal for your blood pressure. Your goal will be based on your health and your age. Lifestyle changes, such as eating healthy and being active, are always important to help lower blood pressure. You might also take medicine to reach your blood pressure goal. 
Follow-up care is a key part of your treatment and safety. Be sure to make and go to all appointments, and call your doctor if you are having problems. It's also a good idea to know your test results and keep a list of the medicines you take. How can you care for yourself at home? Medical treatment · If you stop taking your medicine, your blood pressure will go back up. You may take one or more types of medicine to lower your blood pressure. Be safe with medicines. Take your medicine exactly as prescribed. Call your doctor if you think you are having a problem with your medicine. · Talk to your doctor before you start taking aspirin every day. Aspirin can help certain people lower their risk of a heart attack or stroke. But taking aspirin isn't right for everyone, because it can cause serious bleeding. · See your doctor regularly. You may need to see the doctor more often at first or until your blood pressure comes down. · If you are taking blood pressure medicine, talk to your doctor before you take decongestants or anti-inflammatory medicine, such as ibuprofen. Some of these medicines can raise blood pressure. · Learn how to check your blood pressure at home. Lifestyle changes · Stay at a healthy weight. This is especially important if you put on weight around the waist. Losing even 10 pounds can help you lower your blood pressure. · If your doctor recommends it, get more exercise. Walking is a good choice. Bit by bit, increase the amount you walk every day. Try for at least 30 minutes on most days of the week. You also may want to swim, bike, or do other activities. · Avoid or limit alcohol. Talk to your doctor about whether you can drink any alcohol. · Try to limit how much sodium you eat to less than 2,300 milligrams (mg) a day. Your doctor may ask you to try to eat less than 1,500 mg a day. · Eat plenty of fruits (such as bananas and oranges), vegetables, legumes, whole grains, and low-fat dairy products. · Lower the amount of saturated fat in your diet. Saturated fat is found in animal products such as milk, cheese, and meat. Limiting these foods may help you lose weight and also lower your risk for heart disease. · Do not smoke. Smoking increases your risk for heart attack and stroke. If you need help quitting, talk to your doctor about stop-smoking programs and medicines. These can increase your chances of quitting for good. When should you call for help? Call  911 anytime you think you may need emergency care. This may mean having symptoms that suggest that your blood pressure is causing a serious heart or blood vessel problem. Your blood pressure may be over 180/120. For example, call 911 if: 
  · You have symptoms of a heart attack. These may include: 
? Chest pain or pressure, or a strange feeling in the chest. 
? Sweating. ? Shortness of breath. ? Nausea or vomiting. ? Pain, pressure, or a strange feeling in the back, neck, jaw, or upper belly or in one or both shoulders or arms. ? Lightheadedness or sudden weakness. ? A fast or irregular heartbeat.  
  · You have symptoms of a stroke. These may include: 
? Sudden numbness, tingling, weakness, or loss of movement in your face, arm, or leg, especially on only one side of your body. ? Sudden vision changes. ? Sudden trouble speaking. ? Sudden confusion or trouble understanding simple statements. ? Sudden problems with walking or balance. ? A sudden, severe headache that is different from past headaches.  
  · You have severe back or belly pain. Do not wait until your blood pressure comes down on its own. Get help right away. Call your doctor now or seek immediate care if: 
  · Your blood pressure is much higher than normal (such as 180/120 or higher), but you don't have symptoms.  
  · You think high blood pressure is causing symptoms, such as: 
? Severe headache. 
? Blurry vision. Watch closely for changes in your health, and be sure to contact your doctor if: 
  · Your blood pressure measures higher than your doctor recommends at least 2 times. That means the top number is higher or the bottom number is higher, or both.  
  · You think you may be having side effects from your blood pressure medicine. Where can you learn more? Go to http://www.gray.com/ Enter G827 in the search box to learn more about \"High Blood Pressure: Care Instructions. \" Current as of: December 16, 2019               Content Version: 12.6 © 9543-5837 [employer]. Care instructions adapted under license by [employer] (which disclaims liability or warranty for this information). If you have questions about a medical condition or this instruction, always ask your healthcare professional. [address] any warranty or liability for your use of this information.

## 2021-01-04 NOTE — PROGRESS NOTES
Patient is in the office today to establish care and 6 month follow up. 1. Have you been to the ER, urgent care clinic since your last visit? Hospitalized since your last visit? No    2. Have you seen or consulted any other health care providers outside of the [address] [place] [address] since your last visit? Include any pap smears or colon screening. Yes, Dr. Edith Spears in [facility].

## 2021-01-21 DIAGNOSIS — R05.3 PERSISTENT COUGH: ICD-10-CM

## 2021-01-21 RX ORDER — LOSARTAN POTASSIUM 25 MG/1
25 TABLET ORAL DAILY
Qty: 90 TAB | Refills: 3 | Status: SHIPPED | OUTPATIENT
Start: 2021-01-21 | End: 2022-01-21

## 2021-01-21 NOTE — TELEPHONE ENCOUNTER
Last Visit: 1/4/21 with MD Montes  Next Appointment: 4/5/21 with MD Montes  Previous Refill Encounter(s): 1/30/20 #90 with 3 refills    Requested Prescriptions     Pending Prescriptions Disp Refills    losartan (COZAAR) 25 mg tablet 90 Tab 3     Sig: Take 1 Tab by mouth daily.

## 2021-03-11 RX ORDER — HYDROCHLOROTHIAZIDE 25 MG/1
TABLET ORAL
Qty: 90 TAB | Refills: 1 | Status: SHIPPED | OUTPATIENT
Start: 2021-03-11 | End: 2021-07-09

## 2021-03-29 ENCOUNTER — HOSPITAL ENCOUNTER (OUTPATIENT)
Dept: LAB | Age: 76
Discharge: HOME | End: 2021-03-29
Payer: COMMERCIAL

## 2021-03-29 ENCOUNTER — APPOINTMENT (OUTPATIENT)
Dept: INTERNAL MEDICINE CLINIC | Age: 76
End: 2021-03-29

## 2021-03-29 DIAGNOSIS — E78.5 HYPERLIPIDEMIA LDL GOAL <100: ICD-10-CM

## 2021-03-29 DIAGNOSIS — I10 PRIMARY HYPERTENSION: ICD-10-CM

## 2021-03-29 LAB
ALBUMIN SERPL-MCNC: 3.6 G/DL (ref 3.4–5)
ALBUMIN/GLOB SERPL: 1.2 {RATIO} (ref 0.8–1.7)
ALP SERPL-CCNC: 108 U/L (ref 45–117)
ALT SERPL-CCNC: 20 U/L (ref 13–56)
ANION GAP SERPL CALC-SCNC: 7 MMOL/L (ref 3–18)
AST SERPL-CCNC: 13 U/L (ref 10–38)
BILIRUB SERPL-MCNC: 0.4 MG/DL (ref 0.2–1)
BUN SERPL-MCNC: 19 MG/DL (ref 7–18)
BUN/CREAT SERPL: 20 (ref 12–20)
CALCIUM SERPL-MCNC: 9.2 MG/DL (ref 8.5–10.1)
CHLORIDE SERPL-SCNC: 107 MMOL/L (ref 100–111)
CHOLEST SERPL-MCNC: 221 MG/DL
CO2 SERPL-SCNC: 29 MMOL/L (ref 21–32)
CREAT SERPL-MCNC: 0.94 MG/DL (ref 0.6–1.3)
GLOBULIN SER CALC-MCNC: 2.9 G/DL (ref 2–4)
GLUCOSE SERPL-MCNC: 114 MG/DL (ref 74–99)
HDLC SERPL-MCNC: 47 MG/DL (ref 40–60)
HDLC SERPL: 4.7 {RATIO} (ref 0–5)
LDLC SERPL CALC-MCNC: 132 MG/DL (ref 0–100)
LIPID PROFILE,FLP: ABNORMAL
POTASSIUM SERPL-SCNC: 3.9 MMOL/L (ref 3.5–5.5)
PROT SERPL-MCNC: 6.5 G/DL (ref 6.4–8.2)
SODIUM SERPL-SCNC: 143 MMOL/L (ref 136–145)
TRIGL SERPL-MCNC: 210 MG/DL (ref ?–150)
VLDLC SERPL CALC-MCNC: 42 MG/DL

## 2021-03-29 PROCEDURE — 36415 COLL VENOUS BLD VENIPUNCTURE: CPT

## 2021-03-29 PROCEDURE — 80061 LIPID PANEL: CPT

## 2021-03-29 PROCEDURE — 80053 COMPREHEN METABOLIC PANEL: CPT

## 2021-03-31 DIAGNOSIS — J43.2 CENTRILOBULAR EMPHYSEMA (HCC): ICD-10-CM

## 2021-03-31 RX ORDER — UMECLIDINIUM BROMIDE AND VILANTEROL TRIFENATATE 62.5; 25 UG/1; UG/1
POWDER RESPIRATORY (INHALATION)
Qty: 1 INHALER | Refills: 5 | Status: SHIPPED | OUTPATIENT
Start: 2021-03-31 | End: 2021-09-28

## 2021-04-05 ENCOUNTER — OFFICE VISIT (OUTPATIENT)
Dept: INTERNAL MEDICINE CLINIC | Age: 76
End: 2021-04-05
Payer: COMMERCIAL

## 2021-04-05 VITALS
HEIGHT: 64 IN | WEIGHT: 225 LBS | TEMPERATURE: 97.6 F | HEART RATE: 69 BPM | SYSTOLIC BLOOD PRESSURE: 142 MMHG | OXYGEN SATURATION: 97 % | BODY MASS INDEX: 38.41 KG/M2 | RESPIRATION RATE: 20 BRPM | DIASTOLIC BLOOD PRESSURE: 54 MMHG

## 2021-04-05 DIAGNOSIS — E66.01 CLASS 2 SEVERE OBESITY DUE TO EXCESS CALORIES WITH SERIOUS COMORBIDITY AND BODY MASS INDEX (BMI) OF 38.0 TO 38.9 IN ADULT (HCC): ICD-10-CM

## 2021-04-05 DIAGNOSIS — F32.5 MAJOR DEPRESSION IN COMPLETE REMISSION (HCC): ICD-10-CM

## 2021-04-05 DIAGNOSIS — E78.5 HYPERLIPIDEMIA LDL GOAL <100: ICD-10-CM

## 2021-04-05 DIAGNOSIS — J43.2 CENTRILOBULAR EMPHYSEMA (HCC): ICD-10-CM

## 2021-04-05 DIAGNOSIS — R73.03 PREDIABETES: ICD-10-CM

## 2021-04-05 DIAGNOSIS — I10 PRIMARY HYPERTENSION: Primary | ICD-10-CM

## 2021-04-05 PROCEDURE — 99214 OFFICE O/P EST MOD 30 MIN: CPT | Performed by: INTERNAL MEDICINE

## 2021-04-05 PROCEDURE — G8753 SYS BP > OR = 140: HCPCS | Performed by: INTERNAL MEDICINE

## 2021-04-05 PROCEDURE — G9717 DOC PT DX DEP/BP F/U NT REQ: HCPCS | Performed by: INTERNAL MEDICINE

## 2021-04-05 PROCEDURE — G8754 DIAS BP LESS 90: HCPCS | Performed by: INTERNAL MEDICINE

## 2021-04-05 PROCEDURE — G8399 PT W/DXA RESULTS DOCUMENT: HCPCS | Performed by: INTERNAL MEDICINE

## 2021-04-05 PROCEDURE — G8417 CALC BMI ABV UP PARAM F/U: HCPCS | Performed by: INTERNAL MEDICINE

## 2021-04-05 PROCEDURE — 1090F PRES/ABSN URINE INCON ASSESS: CPT | Performed by: INTERNAL MEDICINE

## 2021-04-05 PROCEDURE — G8536 NO DOC ELDER MAL SCRN: HCPCS | Performed by: INTERNAL MEDICINE

## 2021-04-05 PROCEDURE — 3017F COLORECTAL CA SCREEN DOC REV: CPT | Performed by: INTERNAL MEDICINE

## 2021-04-05 PROCEDURE — 1101F PT FALLS ASSESS-DOCD LE1/YR: CPT | Performed by: INTERNAL MEDICINE

## 2021-04-05 PROCEDURE — G8427 DOCREV CUR MEDS BY ELIG CLIN: HCPCS | Performed by: INTERNAL MEDICINE

## 2021-04-05 RX ORDER — SIMVASTATIN 20 MG/1
TABLET, FILM COATED ORAL
Qty: 90 TAB | Refills: 1 | Status: SHIPPED | OUTPATIENT
Start: 2021-04-05 | End: 2021-12-28

## 2021-04-05 NOTE — PROGRESS NOTES
Subjective:       Chief Complaint  The patient presents for follow up of hypertension and high cholesterol. Depression         HPI  Monica Hudson is a 76 y.o. female seen for follow up of hyperlipidemia. Shealso has hypertension. Hypertension no significant medication side effects noted, borderline controlled, on HCTZ 25 mg and Cozaar 25 mg, hyperlipidemia well uncontrolled, no significant medication side effects noted, pt has been off Zocor 20 mg for the last 2 months. Diet and Lifestyle: generally follows a low fat low cholesterol diet, sedentary    Home BP Monitoring: pt will try to monitor at home on a more regular basis . Other symptoms and concerns: Patient has a history of depression for which she uses Lexapro 10 mg daily with good improvement. COPD  Patient complains of dyspnea. Symptoms began several years ago. Symptoms chronic dyspnea: severity = moderate: course of sx: gradually worsening does worsen with exertion. Sputum is clear in moderate amounts. Patient uses 1 pillows at night. Patient can walk 100 feet before resting. Patient currently is not on home oxygen therapy. Ulus Reusing Respiratory history: history of 60 pack years tobacco use stopping 1983. Pt currently using Anoro Ellpita with much improvement in breathing and no longer needs albuterol      Pt fasting BS is elevated at 114 so will need to check Hab1c at next OV. Patient has a history of goiter for which she has seen endocrine in the past but has not had any change in the size. Discussed the patient's BMI with her. The BMI follow up plan is as follows: I have counseled this patient on diet and exercise regimens.   Patient already had a gastric bypass in the past.      Current Outpatient Medications   Medication Sig    simvastatin (ZOCOR) 20 mg tablet TAKE 1 TABLET BY MOUTH EVERY DAY IN THE EVENING    Anoro Ellipta 62.5-25 mcg/actuation inhaler INHALE 1 PUFF BY MOUTH EVERY DAY    hydroCHLOROthiazide (HYDRODIURIL) 25 mg tablet TAKE 1 TABLET BY MOUTH EVERY DAY    losartan (COZAAR) 25 mg tablet Take 1 Tab by mouth daily.  albuterol (PROVENTIL HFA, VENTOLIN HFA, PROAIR HFA) 90 mcg/actuation inhaler Take 1-2 Puffs by inhalation every four (4) hours as needed for Wheezing.  escitalopram oxalate (LEXAPRO) 10 mg tablet Take 1 Tab by mouth daily.  cranberry extract 450 mg tab Take  by mouth.  inhalational spacing device 1 Each by Does Not Apply route as needed.  cholecalciferol, vitamin d3, (VITAMIN D) 1,000 unit tablet Take 1,000 Units by mouth two (2) times a day.  CALCIUM CARBONATE/VITAMIN D3 (OS- + D PO) Take 1 Cap by mouth two (2) times a day.  POLYETHYLENE GLYCOL 3350 (MIRALAX PO) Take  by mouth nightly.  omeprazole (PRILOSEC) 20 mg capsule Take 20 mg by mouth daily.  meclizine (ANTIVERT) 25 mg tablet 1 tablet by mouth every 8 hours as needed for vertigo    estradiol (ESTRACE) 0.01 % (0.1 mg/gram) vaginal cream Apply 0.5 grams with fingertip to urethral opening twice weekly.  ondansetron hcl (ZOFRAN) 4 mg tablet Take 1 Tab by mouth every eight (8) hours as needed.  cyanocobalamin (VITAMIN B-12) 500 mcg tablet Take 500 mcg by mouth daily. No current facility-administered medications for this visit.               Review of Systems  Respiratory: negative for dyspnea on exertion  Cardiovascular: negative for chest pain    Objective:     Visit Vitals  BP (!) 142/54 (BP 1 Location: Left arm, BP Patient Position: Sitting, BP Cuff Size: Adult)   Pulse 69   Temp 97.6 °F (36.4 °C) (Temporal)   Resp 20   Ht 5' 4\" (1.626 m)   Wt 225 lb (102.1 kg)   SpO2 97%   BMI 38.62 kg/m²        Eyes - pupils equal and reactive, extraocular eye movements intact  Chest - clear to auscultation, no wheezes, rales or rhonchi, symmetric air entry  Heart - normal rate, regular rhythm, normal S1, S2, no murmurs, rubs, clicks or gallops  Extremities - peripheral pulses normal, no pedal edema, no clubbing or cyanosis      Labs: Lab Results   Component Value Date/Time    Cholesterol, total 221 (H) 03/29/2021 08:56 AM    HDL Cholesterol 47 03/29/2021 08:56 AM    LDL, calculated 132 (H) 03/29/2021 08:56 AM    Triglyceride 210 (H) 03/29/2021 08:56 AM    CHOL/HDL Ratio 4.7 03/29/2021 08:56 AM     Lab Results   Component Value Date/Time    Sodium 143 03/29/2021 08:56 AM    Potassium 3.9 03/29/2021 08:56 AM    Chloride 107 03/29/2021 08:56 AM    CO2 29 03/29/2021 08:56 AM    Anion gap 7 03/29/2021 08:56 AM    Glucose 114 (H) 03/29/2021 08:56 AM    BUN 19 (H) 03/29/2021 08:56 AM    Creatinine 0.94 03/29/2021 08:56 AM    BUN/Creatinine ratio 20 03/29/2021 08:56 AM    GFR est AA >60 03/29/2021 08:56 AM    GFR est non-AA 58 (L) 03/29/2021 08:56 AM    Calcium 9.2 03/29/2021 08:56 AM    Bilirubin, total 0.4 03/29/2021 08:56 AM    ALT (SGPT) 20 03/29/2021 08:56 AM    Alk. phosphatase 108 03/29/2021 08:56 AM    Protein, total 6.5 03/29/2021 08:56 AM    Albumin 3.6 03/29/2021 08:56 AM    Globulin 2.9 03/29/2021 08:56 AM    A-G Ratio 1.2 03/29/2021 08:56 AM      Lab Results   Component Value Date/Time    Hemoglobin A1c 5.6 11/30/2016 10:21 AM            Assessment / Plan     Hypertension borderline controlled, on Cozaar 25 mg and HCTZ 25 mg, patient to monitor blood pressure at home but if not improved by next OV will need to adjust meds  Hyperlipidemia uncontrolled, on Zocor 20 mg daily due to poor compliance. Pt to restart medication. .      ICD-10-CM ICD-9-CM    1. Primary hypertension  E34 205.1 METABOLIC PANEL, COMPREHENSIVE   2. Hyperlipidemia LDL goal <100  E78.5 272.4 LIPID PANEL   3. Centrilobular emphysema (HCC)  J43.2 492.8 Much improved on ANoro Ellipta   4. Major depression in complete remission ([facility])  F32.5 296.26 Well controlled on Lexapro 10 mg    5. Prediabetes  R73.03 790.29 Will try to improve with diet and cutting back starches HEMOGLOBIN A1C W/O EAG   6.  Class 2 severe obesity due to excess calories with serious comorbidity and body mass index (BMI) of 38.0 to 38.9 in adult [facility])  E66.01 278.01 will work on cutting back starches and sweets in diet. Z68.38 V85.38              Low cholesterol diet, weight control and daily exercise discussed. Follow-up and Dispositions    · Return in about 4 months (around 8/5/2021) for labs 1 week before. Reviewed plan of care. Patient has provided input and agrees with goals.

## 2021-04-05 NOTE — PROGRESS NOTES
Patient is in the office today for a 3 month follow up. Patient has not had Simvastatin in 2 months. Patient states if she needs to continue medication she will need a refill. 1. Have you been to the ER, urgent care clinic since your last visit? Hospitalized since your last visit? No    2. Have you seen or consulted any other health care providers outside of the [address] [place] [address] since your last visit? Include any pap smears or colon screening. Yes, Dr. Nadine Dobson in [place].

## 2021-04-05 NOTE — PATIENT INSTRUCTIONS
High Blood Pressure: Care Instructions Overview It's normal for blood pressure to go up and down throughout the day. But if it stays up, you have high blood pressure. Another name for high blood pressure is hypertension. Despite what a lot of people think, high blood pressure usually doesn't cause headaches or make you feel dizzy or lightheaded. It usually has no symptoms. But it does increase your risk of stroke, heart attack, and other problems. You and your doctor will talk about your risks of these problems based on your blood pressure. Your doctor will give you a goal for your blood pressure. Your goal will be based on your health and your age. Lifestyle changes, such as eating healthy and being active, are always important to help lower blood pressure. You might also take medicine to reach your blood pressure goal. 
Follow-up care is a key part of your treatment and safety. Be sure to make and go to all appointments, and call your doctor if you are having problems. It's also a good idea to know your test results and keep a list of the medicines you take. How can you care for yourself at home? Medical treatment · If you stop taking your medicine, your blood pressure will go back up. You may take one or more types of medicine to lower your blood pressure. Be safe with medicines. Take your medicine exactly as prescribed. Call your doctor if you think you are having a problem with your medicine. · Talk to your doctor before you start taking aspirin every day. Aspirin can help certain people lower their risk of a heart attack or stroke. But taking aspirin isn't right for everyone, because it can cause serious bleeding. · See your doctor regularly. You may need to see the doctor more often at first or until your blood pressure comes down. · If you are taking blood pressure medicine, talk to your doctor before you take decongestants or anti-inflammatory medicine, such as ibuprofen.  Some of these medicines can raise blood pressure. · Learn how to check your blood pressure at home. Lifestyle changes · Stay at a healthy weight. This is especially important if you put on weight around the waist. Losing even 10 pounds can help you lower your blood pressure. · If your doctor recommends it, get more exercise. Walking is a good choice. Bit by bit, increase the amount you walk every day. Try for at least 30 minutes on most days of the week. You also may want to swim, bike, or do other activities. · Avoid or limit alcohol. Talk to your doctor about whether you can drink any alcohol. · Try to limit how much sodium you eat to less than 2,300 milligrams (mg) a day. Your doctor may ask you to try to eat less than 1,500 mg a day. · Eat plenty of fruits (such as bananas and oranges), vegetables, legumes, whole grains, and low-fat dairy products. · Lower the amount of saturated fat in your diet. Saturated fat is found in animal products such as milk, cheese, and meat. Limiting these foods may help you lose weight and also lower your risk for heart disease. · Do not smoke. Smoking increases your risk for heart attack and stroke. If you need help quitting, talk to your doctor about stop-smoking programs and medicines. These can increase your chances of quitting for good. When should you call for help? Call  911 anytime you think you may need emergency care. This may mean having symptoms that suggest that your blood pressure is causing a serious heart or blood vessel problem. Your blood pressure may be over 180/120. For example, call 911 if: 
  · You have symptoms of a heart attack. These may include: 
? Chest pain or pressure, or a strange feeling in the chest. 
? Sweating. ? Shortness of breath. ? Nausea or vomiting. ? Pain, pressure, or a strange feeling in the back, neck, jaw, or upper belly or in one or both shoulders or arms. ? Lightheadedness or sudden weakness.  
? A fast or irregular heartbeat.  
  · You have symptoms of a stroke. These may include: 
? Sudden numbness, tingling, weakness, or loss of movement in your face, arm, or leg, especially on only one side of your body. ? Sudden vision changes. ? Sudden trouble speaking. ? Sudden confusion or trouble understanding simple statements. ? Sudden problems with walking or balance. ? A sudden, severe headache that is different from past headaches.  
  · You have severe back or belly pain. Do not wait until your blood pressure comes down on its own. Get help right away. Call your doctor now or seek immediate care if: 
  · Your blood pressure is much higher than normal (such as 180/120 or higher), but you don't have symptoms.  
  · You think high blood pressure is causing symptoms, such as: 
? Severe headache. 
? Blurry vision. Watch closely for changes in your health, and be sure to contact your doctor if: 
  · Your blood pressure measures higher than your doctor recommends at least 2 times. That means the top number is higher or the bottom number is higher, or both.  
  · You think you may be having side effects from your blood pressure medicine. Where can you learn more? Go to http://josé-efren.info/ Enter P706 in the search box to learn more about \"High Blood Pressure: Care Instructions. \" Current as of: August 31, 2020               Content Version: 12.8 © 2006-2021 [employer]. Care instructions adapted under license by [employer] (which disclaims liability or warranty for this information). If you have questions about a medical condition or this instruction, always ask your healthcare professional. [place] [address] any warranty or liability for your use of this information.

## 2021-04-19 RX ORDER — ESCITALOPRAM OXALATE 10 MG/1
10 TABLET ORAL DAILY
Qty: 90 TAB | Refills: 3 | Status: SHIPPED | OUTPATIENT
Start: 2021-04-19 | End: 2022-04-09

## 2021-04-19 NOTE — TELEPHONE ENCOUNTER
Last Visit: 4/5/2021 with MD Montes  Next Appointment: 8/13/2021 with MD Montes  Previous Refill Encounter(s): 5/4/2020 per MD Nan Peter #[phone] [id number]    Requested Prescriptions     Pending Prescriptions Disp Refills    escitalopram oxalate (LEXAPRO) 10 mg tablet 90 Tab 3     Sig: Take 1 Tab by mouth daily.

## 2021-07-09 RX ORDER — HYDROCHLOROTHIAZIDE 25 MG/1
TABLET ORAL
Qty: 90 TABLET | Refills: 1 | Status: SHIPPED | OUTPATIENT
Start: 2021-07-09 | End: 2022-02-17

## 2021-08-06 ENCOUNTER — HOSPITAL ENCOUNTER (OUTPATIENT)
Dept: LAB | Age: 76
Discharge: HOME | End: 2021-08-06
Payer: COMMERCIAL

## 2021-08-06 ENCOUNTER — APPOINTMENT (OUTPATIENT)
Dept: INTERNAL MEDICINE CLINIC | Age: 76
End: 2021-08-06

## 2021-08-06 DIAGNOSIS — R73.03 PREDIABETES: ICD-10-CM

## 2021-08-06 DIAGNOSIS — E78.5 HYPERLIPIDEMIA LDL GOAL <100: ICD-10-CM

## 2021-08-06 DIAGNOSIS — I10 PRIMARY HYPERTENSION: ICD-10-CM

## 2021-08-06 LAB
ALBUMIN SERPL-MCNC: 3.8 G/DL (ref 3.4–5)
ALBUMIN/GLOB SERPL: 1.3 {RATIO} (ref 0.8–1.7)
ALP SERPL-CCNC: 115 U/L (ref 45–117)
ALT SERPL-CCNC: 18 U/L (ref 13–56)
ANION GAP SERPL CALC-SCNC: 6 MMOL/L (ref 3–18)
AST SERPL-CCNC: 17 U/L (ref 10–38)
BILIRUB SERPL-MCNC: 1.3 MG/DL (ref 0.2–1)
BUN SERPL-MCNC: 17 MG/DL (ref 7–18)
BUN/CREAT SERPL: 17 (ref 12–20)
CALCIUM SERPL-MCNC: 9.2 MG/DL (ref 8.5–10.1)
CHLORIDE SERPL-SCNC: 105 MMOL/L (ref 100–111)
CHOLEST SERPL-MCNC: 158 MG/DL
CO2 SERPL-SCNC: 29 MMOL/L (ref 21–32)
CREAT SERPL-MCNC: 1.02 MG/DL (ref 0.6–1.3)
GLOBULIN SER CALC-MCNC: 2.9 G/DL (ref 2–4)
GLUCOSE SERPL-MCNC: 98 MG/DL (ref 74–99)
HBA1C MFR BLD: 6.3 % (ref 4.2–5.6)
HDLC SERPL-MCNC: 49 MG/DL (ref 40–60)
HDLC SERPL: 3.2 {RATIO} (ref 0–5)
LDLC SERPL CALC-MCNC: 66 MG/DL (ref 0–100)
LIPID PROFILE,FLP: ABNORMAL
POTASSIUM SERPL-SCNC: 4.7 MMOL/L (ref 3.5–5.5)
PROT SERPL-MCNC: 6.7 G/DL (ref 6.4–8.2)
SODIUM SERPL-SCNC: 140 MMOL/L (ref 136–145)
TRIGL SERPL-MCNC: 215 MG/DL (ref ?–150)
VLDLC SERPL CALC-MCNC: 43 MG/DL

## 2021-08-06 PROCEDURE — 83036 HEMOGLOBIN GLYCOSYLATED A1C: CPT

## 2021-08-06 PROCEDURE — 80061 LIPID PANEL: CPT

## 2021-08-06 PROCEDURE — 80053 COMPREHEN METABOLIC PANEL: CPT

## 2021-08-06 PROCEDURE — 36415 COLL VENOUS BLD VENIPUNCTURE: CPT

## 2021-08-13 ENCOUNTER — OFFICE VISIT (OUTPATIENT)
Dept: INTERNAL MEDICINE CLINIC | Age: 76
End: 2021-08-13
Payer: COMMERCIAL

## 2021-08-13 VITALS
OXYGEN SATURATION: 97 % | TEMPERATURE: 98.3 F | WEIGHT: 219 LBS | HEART RATE: 69 BPM | SYSTOLIC BLOOD PRESSURE: 135 MMHG | RESPIRATION RATE: 17 BRPM | BODY MASS INDEX: 37.59 KG/M2 | DIASTOLIC BLOOD PRESSURE: 51 MMHG

## 2021-08-13 DIAGNOSIS — I10 PRIMARY HYPERTENSION: Primary | ICD-10-CM

## 2021-08-13 DIAGNOSIS — R73.03 PREDIABETES: ICD-10-CM

## 2021-08-13 DIAGNOSIS — N18.31 STAGE 3A CHRONIC KIDNEY DISEASE (HCC): ICD-10-CM

## 2021-08-13 DIAGNOSIS — F32.5 MAJOR DEPRESSION IN COMPLETE REMISSION (HCC): ICD-10-CM

## 2021-08-13 DIAGNOSIS — E66.01 CLASS 2 SEVERE OBESITY DUE TO EXCESS CALORIES WITH SERIOUS COMORBIDITY AND BODY MASS INDEX (BMI) OF 37.0 TO 37.9 IN ADULT (HCC): ICD-10-CM

## 2021-08-13 DIAGNOSIS — E78.5 HYPERLIPIDEMIA LDL GOAL <100: ICD-10-CM

## 2021-08-13 DIAGNOSIS — J43.2 CENTRILOBULAR EMPHYSEMA (HCC): ICD-10-CM

## 2021-08-13 PROCEDURE — G8399 PT W/DXA RESULTS DOCUMENT: HCPCS | Performed by: INTERNAL MEDICINE

## 2021-08-13 PROCEDURE — G8417 CALC BMI ABV UP PARAM F/U: HCPCS | Performed by: INTERNAL MEDICINE

## 2021-08-13 PROCEDURE — 99214 OFFICE O/P EST MOD 30 MIN: CPT | Performed by: INTERNAL MEDICINE

## 2021-08-13 PROCEDURE — G8427 DOCREV CUR MEDS BY ELIG CLIN: HCPCS | Performed by: INTERNAL MEDICINE

## 2021-08-13 PROCEDURE — G8754 DIAS BP LESS 90: HCPCS | Performed by: INTERNAL MEDICINE

## 2021-08-13 PROCEDURE — G8536 NO DOC ELDER MAL SCRN: HCPCS | Performed by: INTERNAL MEDICINE

## 2021-08-13 PROCEDURE — 1101F PT FALLS ASSESS-DOCD LE1/YR: CPT | Performed by: INTERNAL MEDICINE

## 2021-08-13 PROCEDURE — G8752 SYS BP LESS 140: HCPCS | Performed by: INTERNAL MEDICINE

## 2021-08-13 PROCEDURE — G9717 DOC PT DX DEP/BP F/U NT REQ: HCPCS | Performed by: INTERNAL MEDICINE

## 2021-08-13 PROCEDURE — 1090F PRES/ABSN URINE INCON ASSESS: CPT | Performed by: INTERNAL MEDICINE

## 2021-08-13 NOTE — PROGRESS NOTES
Subjective:       Chief Complaint  The patient presents for follow up of hypertension and high cholesterol. Depression         HPI  Yulia Mendes is a 68 y.o. female seen for follow up of hyperlipidemia. Shealessandrao has hypertension. Hypertension no significant medication side effects noted, well controlled, on HCTZ 25 mg and Cozaar 25 mg, patient has chronic kidney disease stage III which we will continue to monitor closely, hyperlipidemia well controlled, no significant medication side effects noted, patient is now back on Zocor 20 mg daily. Diet and Lifestyle: generally follows a low fat low cholesterol diet, sedentary    Home BP Monitoring: pt will try to monitor at home on a more regular basis . Other symptoms and concerns: Patient has a history of depression for which she uses Lexapro 10 mg daily with good improvement. COPD  Patient complains of dyspnea. Symptoms began several years ago. Symptoms chronic dyspnea: severity = moderate: course of sx: gradually worsening does worsen with exertion. Sputum is clear in moderate amounts. Patient uses 1 pillows at night. Patient can walk 100 feet before resting. Patient currently is not on home oxygen therapy. Sugey Ayala Respiratory history: history of 60 pack years tobacco use stopping 1983. Pt currently using Anoro Ellpita with much improvement in breathing and no longer needs albuterol      Patient has prediabetes which she is going to try and controlled by cutting back starches and sweets in her diet and decreasing her BMI from 37 which it currently is. Patient has a history of gastric bypass in the past      Patient has a history of goiter for which she has seen endocrine in the past but has not had any change in the size. Discussed the patient's BMI with her. The BMI follow up plan is as follows: I have counseled this patient on diet and exercise regimens.   Patient already had a gastric bypass in the past.      Current Outpatient Medications   Medication Sig  hydroCHLOROthiazide (HYDRODIURIL) 25 mg tablet TAKE 1 TABLET BY MOUTH EVERY DAY    escitalopram oxalate (LEXAPRO) 10 mg tablet Take 1 Tab by mouth daily.  simvastatin (ZOCOR) 20 mg tablet TAKE 1 TABLET BY MOUTH EVERY DAY IN THE EVENING    Anoro Ellipta 62.5-25 mcg/actuation inhaler INHALE 1 PUFF BY MOUTH EVERY DAY    losartan (COZAAR) 25 mg tablet Take 1 Tab by mouth daily.  meclizine (ANTIVERT) 25 mg tablet 1 tablet by mouth every 8 hours as needed for vertigo    albuterol (PROVENTIL HFA, VENTOLIN HFA, PROAIR HFA) 90 mcg/actuation inhaler Take 1-2 Puffs by inhalation every four (4) hours as needed for Wheezing.  estradiol (ESTRACE) 0.01 % (0.1 mg/gram) vaginal cream Apply 0.5 grams with fingertip to urethral opening twice weekly.  ondansetron hcl (ZOFRAN) 4 mg tablet Take 1 Tab by mouth every eight (8) hours as needed.  cranberry extract 450 mg tab Take  by mouth.  inhalational spacing device 1 Each by Does Not Apply route as needed.  cholecalciferol, vitamin d3, (VITAMIN D) 1,000 unit tablet Take 1,000 Units by mouth two (2) times a day.  cyanocobalamin (VITAMIN B-12) 500 mcg tablet Take 500 mcg by mouth daily.  CALCIUM CARBONATE/VITAMIN D3 (OS- + D PO) Take 1 Cap by mouth two (2) times a day.  POLYETHYLENE GLYCOL 3350 (MIRALAX PO) Take  by mouth nightly.  omeprazole (PRILOSEC) 20 mg capsule Take 20 mg by mouth daily. No current facility-administered medications for this visit.              Review of Systems  Respiratory: negative for dyspnea on exertion  Cardiovascular: negative for chest pain    Objective:     Visit Vitals  BP (!) 135/51 (BP 1 Location: Right arm, BP Patient Position: Sitting, BP Cuff Size: Large adult)   Pulse 69   Temp 98.3 °F (36.8 °C) (Temporal)   Resp 17   Wt 219 lb (99.3 kg)   SpO2 97%   BMI 37.59 kg/m²        Eyes - pupils equal and reactive, extraocular eye movements intact  Chest - clear to auscultation, no wheezes, rales or rhonchi, symmetric air entry  Heart - normal rate, regular rhythm, normal S1, S2, no murmurs, rubs, clicks or gallops  Extremities - peripheral pulses normal, no pedal edema, no clubbing or cyanosis      Labs:   Lab Results   Component Value Date/Time    Cholesterol, total 158 08/06/2021 09:45 AM    HDL Cholesterol 49 08/06/2021 09:45 AM    LDL, calculated 66 08/06/2021 09:45 AM    Triglyceride 215 (H) 08/06/2021 09:45 AM    CHOL/HDL Ratio 3.2 08/06/2021 09:45 AM     Lab Results   Component Value Date/Time    Sodium 140 08/06/2021 09:45 AM    Potassium 4.7 08/06/2021 09:45 AM    Chloride 105 08/06/2021 09:45 AM    CO2 29 08/06/2021 09:45 AM    Anion gap 6 08/06/2021 09:45 AM    Glucose 98 08/06/2021 09:45 AM    BUN 17 08/06/2021 09:45 AM    Creatinine 1.02 08/06/2021 09:45 AM    BUN/Creatinine ratio 17 08/06/2021 09:45 AM    GFR est AA >60 08/06/2021 09:45 AM    GFR est non-AA 53 (L) 08/06/2021 09:45 AM    Calcium 9.2 08/06/2021 09:45 AM    Bilirubin, total 1.3 (H) 08/06/2021 09:45 AM    ALT (SGPT) 18 08/06/2021 09:45 AM    Alk. phosphatase 115 08/06/2021 09:45 AM    Protein, total 6.7 08/06/2021 09:45 AM    Albumin 3.8 08/06/2021 09:45 AM    Globulin 2.9 08/06/2021 09:45 AM    A-G Ratio 1.3 08/06/2021 09:45 AM      Lab Results   Component Value Date/Time    Hemoglobin A1c 6.3 (H) 08/06/2021 09:45 AM            Assessment / Plan     Hypertension well controlled, on Cozaar 25 mg and HCTZ 25 mg,   Hyperlipidemia well controlled on Zocor 20 mg daily      ICD-10-CM ICD-9-CM    1. Primary hypertension  W27 966.4 METABOLIC PANEL, COMPREHENSIVE   2. Hyperlipidemia LDL goal <100  E78.5 272.4 LIPID PANEL   3. Prediabetes  R73.03 790.29  patient will try to control with diet and weight loss. HEMOGLOBIN A1C W/O EAG   4. Major depression in complete remission (HCC)  F32.5 296.26  well-controlled on Lexapro 10 mg daily   5. Stage 3a chronic kidney disease (HCC)  N18.31 585. 3  patient will try and decrease progression by losing weight and keeping diabetes and blood pressure under good control   6. Class 2 severe obesity due to excess calories with serious comorbidity and body mass index (BMI) of 37.0 to 37.9 in adult [facility])  E66.01 278.01  patient strongly encouraged to cut back starches and sweets in her diet to lose more weight    Z68.37 V85.37    7. Centrilobular emphysema (HCC)  J43.2 492.8  patient doing well on Anoro               Low cholesterol diet, weight control and daily exercise discussed. Follow-up and Dispositions    · Return in about 4 months (around 12/13/2021) for labs 1 week before. Reviewed plan of care. Patient has provided input and agrees with goals.

## 2021-08-13 NOTE — PROGRESS NOTES
Pt is here for   Chief Complaint   Patient presents with    COPD     follow up    Depression     1. Have you been to the ER, urgent care clinic or hospitalized since your last visit? NO.     2. Have you seen or consulted any other health care providers outside of the [address] [place] [address] since your last visit (Include any pap smears or colon screening)? NO      Do you have an Advanced Directive? YES    Would you like information on Advanced Directives?  NO

## 2021-09-28 DIAGNOSIS — J43.2 CENTRILOBULAR EMPHYSEMA (HCC): ICD-10-CM

## 2021-09-28 RX ORDER — UMECLIDINIUM BROMIDE AND VILANTEROL TRIFENATATE 62.5; 25 UG/1; UG/1
POWDER RESPIRATORY (INHALATION)
Qty: 60 EACH | Refills: 5 | Status: SHIPPED | OUTPATIENT
Start: 2021-09-28

## 2021-12-03 ENCOUNTER — APPOINTMENT (OUTPATIENT)
Dept: INTERNAL MEDICINE CLINIC | Age: 76
End: 2021-12-03

## 2021-12-03 ENCOUNTER — HOSPITAL ENCOUNTER (OUTPATIENT)
Dept: LAB | Age: 76
Discharge: HOME | End: 2021-12-03
Payer: COMMERCIAL

## 2021-12-03 DIAGNOSIS — E78.5 HYPERLIPIDEMIA LDL GOAL <100: ICD-10-CM

## 2021-12-03 DIAGNOSIS — I10 PRIMARY HYPERTENSION: ICD-10-CM

## 2021-12-03 DIAGNOSIS — R73.03 PREDIABETES: ICD-10-CM

## 2021-12-03 LAB
ALBUMIN SERPL-MCNC: 3.7 G/DL (ref 3.4–5)
ALBUMIN/GLOB SERPL: 1.2 {RATIO} (ref 0.8–1.7)
ALP SERPL-CCNC: 116 U/L (ref 45–117)
ALT SERPL-CCNC: 19 U/L (ref 13–56)
ANION GAP SERPL CALC-SCNC: 4 MMOL/L (ref 3–18)
AST SERPL-CCNC: 15 U/L (ref 10–38)
BILIRUB SERPL-MCNC: 0.6 MG/DL (ref 0.2–1)
BUN SERPL-MCNC: 19 MG/DL (ref 7–18)
BUN/CREAT SERPL: 19 (ref 12–20)
CALCIUM SERPL-MCNC: 9.6 MG/DL (ref 8.5–10.1)
CHLORIDE SERPL-SCNC: 105 MMOL/L (ref 100–111)
CHOLEST SERPL-MCNC: 148 MG/DL
CO2 SERPL-SCNC: 31 MMOL/L (ref 21–32)
CREAT SERPL-MCNC: 1.02 MG/DL (ref 0.6–1.3)
GLOBULIN SER CALC-MCNC: 3.2 G/DL (ref 2–4)
GLUCOSE SERPL-MCNC: 95 MG/DL (ref 74–99)
HBA1C MFR BLD: 5.6 % (ref 4.2–5.6)
HDLC SERPL-MCNC: 50 MG/DL (ref 40–60)
HDLC SERPL: 3 {RATIO} (ref 0–5)
LDLC SERPL CALC-MCNC: 66.8 MG/DL (ref 0–100)
LIPID PROFILE,FLP: ABNORMAL
POTASSIUM SERPL-SCNC: 3.9 MMOL/L (ref 3.5–5.5)
PROT SERPL-MCNC: 6.9 G/DL (ref 6.4–8.2)
SODIUM SERPL-SCNC: 140 MMOL/L (ref 136–145)
TRIGL SERPL-MCNC: 156 MG/DL (ref ?–150)
VLDLC SERPL CALC-MCNC: 31.2 MG/DL

## 2021-12-03 PROCEDURE — 80061 LIPID PANEL: CPT

## 2021-12-03 PROCEDURE — 36415 COLL VENOUS BLD VENIPUNCTURE: CPT

## 2021-12-03 PROCEDURE — 80053 COMPREHEN METABOLIC PANEL: CPT

## 2021-12-03 PROCEDURE — 83036 HEMOGLOBIN GLYCOSYLATED A1C: CPT

## 2021-12-10 ENCOUNTER — OFFICE VISIT (OUTPATIENT)
Dept: INTERNAL MEDICINE CLINIC | Age: 76
End: 2021-12-10
Payer: COMMERCIAL

## 2021-12-10 VITALS
TEMPERATURE: 97.6 F | HEIGHT: 64 IN | BODY MASS INDEX: 34.49 KG/M2 | SYSTOLIC BLOOD PRESSURE: 137 MMHG | WEIGHT: 202 LBS | OXYGEN SATURATION: 96 % | DIASTOLIC BLOOD PRESSURE: 61 MMHG | RESPIRATION RATE: 20 BRPM | HEART RATE: 66 BPM

## 2021-12-10 DIAGNOSIS — N18.31 STAGE 3A CHRONIC KIDNEY DISEASE (HCC): ICD-10-CM

## 2021-12-10 DIAGNOSIS — E78.5 HYPERLIPIDEMIA LDL GOAL <100: ICD-10-CM

## 2021-12-10 DIAGNOSIS — F32.5 MAJOR DEPRESSION IN COMPLETE REMISSION (HCC): ICD-10-CM

## 2021-12-10 DIAGNOSIS — J43.2 CENTRILOBULAR EMPHYSEMA (HCC): ICD-10-CM

## 2021-12-10 DIAGNOSIS — R73.03 PREDIABETES: ICD-10-CM

## 2021-12-10 DIAGNOSIS — I10 PRIMARY HYPERTENSION: Primary | ICD-10-CM

## 2021-12-10 PROCEDURE — G9717 DOC PT DX DEP/BP F/U NT REQ: HCPCS | Performed by: INTERNAL MEDICINE

## 2021-12-10 PROCEDURE — G8754 DIAS BP LESS 90: HCPCS | Performed by: INTERNAL MEDICINE

## 2021-12-10 PROCEDURE — G8417 CALC BMI ABV UP PARAM F/U: HCPCS | Performed by: INTERNAL MEDICINE

## 2021-12-10 PROCEDURE — G8399 PT W/DXA RESULTS DOCUMENT: HCPCS | Performed by: INTERNAL MEDICINE

## 2021-12-10 PROCEDURE — G8427 DOCREV CUR MEDS BY ELIG CLIN: HCPCS | Performed by: INTERNAL MEDICINE

## 2021-12-10 PROCEDURE — 1101F PT FALLS ASSESS-DOCD LE1/YR: CPT | Performed by: INTERNAL MEDICINE

## 2021-12-10 PROCEDURE — G8752 SYS BP LESS 140: HCPCS | Performed by: INTERNAL MEDICINE

## 2021-12-10 PROCEDURE — 99214 OFFICE O/P EST MOD 30 MIN: CPT | Performed by: INTERNAL MEDICINE

## 2021-12-10 PROCEDURE — 1090F PRES/ABSN URINE INCON ASSESS: CPT | Performed by: INTERNAL MEDICINE

## 2021-12-10 PROCEDURE — G8536 NO DOC ELDER MAL SCRN: HCPCS | Performed by: INTERNAL MEDICINE

## 2021-12-10 NOTE — PROGRESS NOTES
Patient is in the office today for a 6 month follow up. Do you have an Advance Directive no  Do you want more information : information given     1. \"Have you been to the ER, urgent care clinic since your last visit? Hospitalized since your last visit? \" No    2. \"Have you seen or consulted any other health care providers outside of the [address] [place] [address] since your last visit? \" No     3. For patients aged 39-70: Has the patient had a colonoscopy? NA based on age or sex     If the patient is female:    3. For patients aged 41-77: Has the patient had a mammogram within the past 2 years? NA based on age or sex    11. For patients aged 21-65: Has the patient had a pap smear?  NA based on age or sex

## 2021-12-10 NOTE — PROGRESS NOTES
Subjective:       Chief Complaint  The patient presents for follow up of hypertension and high cholesterol. Depression         HPI  Flip Clark is a 68 y.o. female seen for follow up of hyperlipidemia. Thaiso has hypertension. Hypertension no significant medication side effects noted, well controlled, on HCTZ 25 mg and Cozaar 25 mg, patient has chronic kidney disease stage III which we will continue to monitor closely, hyperlipidemia well controlled, no significant medication side effects noted, patient is now back on Zocor 20 mg daily. Diet and Lifestyle: generally follows a low fat low cholesterol diet, sedentary    Home BP Monitoring: pt will try to monitor at home on a more regular basis . Other symptoms and concerns: Patient has a history of depression for which she uses Lexapro 10 mg daily with good improvement. COPD  Patient complains of dyspnea. Symptoms began several years ago. Symptoms chronic dyspnea: severity = moderate: course of sx: gradually worsening does worsen with exertion. Sputum is clear in moderate amounts. Patient uses 1 pillows at night. Patient can walk 100 feet before resting. Patient currently is not on home oxygen therapy. Anju Phelps Respiratory history: history of 60 pack years tobacco use stopping 1983. Pt currently using Anoro Ellpita with much improvement in breathing and no longer needs albuterol      Patient has prediabetes which she has resolved with weight loss. Her BMI has decreased down to 34 with weight loss. Patient has a history of gastric bypass. Patient has a history of goiter for which she has seen endocrine in the past but has not had any change in the size. Discussed the patient's BMI with her. The BMI follow up plan is as follows: I have counseled this patient on diet and exercise regimens.   Patient already had a gastric bypass in the past.      Current Outpatient Medications   Medication Sig    solifenacin (VESICARE) 10 mg tablet Take 1 Tablet by mouth daily.    hydroCHLOROthiazide (HYDRODIURIL) 25 mg tablet TAKE 1 TABLET BY MOUTH EVERY DAY    escitalopram oxalate (LEXAPRO) 10 mg tablet Take 1 Tab by mouth daily.  simvastatin (ZOCOR) 20 mg tablet TAKE 1 TABLET BY MOUTH EVERY DAY IN THE EVENING    losartan (COZAAR) 25 mg tablet Take 1 Tab by mouth daily.  meclizine (ANTIVERT) 25 mg tablet 1 tablet by mouth every 8 hours as needed for vertigo    albuterol (PROVENTIL HFA, VENTOLIN HFA, PROAIR HFA) 90 mcg/actuation inhaler Take 1-2 Puffs by inhalation every four (4) hours as needed for Wheezing.  estradiol (ESTRACE) 0.01 % (0.1 mg/gram) vaginal cream Apply 0.5 grams with fingertip to urethral opening twice weekly.  ondansetron hcl (ZOFRAN) 4 mg tablet Take 1 Tab by mouth every eight (8) hours as needed.  cranberry extract 450 mg tab Take  by mouth.  inhalational spacing device 1 Each by Does Not Apply route as needed.  cholecalciferol, vitamin d3, (VITAMIN D) 1,000 unit tablet Take 1,000 Units by mouth two (2) times a day.  CALCIUM CARBONATE/VITAMIN D3 (OS- + D PO) Take 1 Cap by mouth two (2) times a day.  POLYETHYLENE GLYCOL 3350 (MIRALAX PO) Take  by mouth nightly.  omeprazole (PRILOSEC) 20 mg capsule Take 20 mg by mouth daily.  Anoro Ellipta 62.5-25 mcg/actuation inhaler INHALE 1 PUFF BY INHALATION EVERY DAY (Patient not taking: Reported on 12/10/2021)    cyanocobalamin (VITAMIN B-12) 500 mcg tablet Take 500 mcg by mouth daily. (Patient not taking: Reported on 12/10/2021)     No current facility-administered medications for this visit.              Review of Systems  Respiratory: negative for dyspnea on exertion  Cardiovascular: negative for chest pain    Objective:     Visit Vitals  /61 (BP 1 Location: Left arm, BP Patient Position: Sitting, BP Cuff Size: Adult)   Pulse 66   Temp 97.6 °F (36.4 °C) (Temporal)   Resp 20   Ht 5' 4\" (1.626 m)   Wt 202 lb (91.6 kg)   SpO2 96%   BMI 34.67 kg/m²        Eyes - pupils equal and reactive, extraocular eye movements intact  Chest - clear to auscultation, no wheezes, rales or rhonchi, symmetric air entry  Heart - normal rate, regular rhythm, normal S1, S2, no murmurs, rubs, clicks or gallops  Extremities - peripheral pulses normal, no pedal edema, no clubbing or cyanosis      Labs:   Lab Results   Component Value Date/Time    Cholesterol, total 148 12/03/2021 10:37 AM    HDL Cholesterol 50 12/03/2021 10:37 AM    LDL, calculated 66.8 12/03/2021 10:37 AM    Triglyceride 156 (H) 12/03/2021 10:37 AM    CHOL/HDL Ratio 3.0 12/03/2021 10:37 AM     Lab Results   Component Value Date/Time    Sodium 140 12/03/2021 10:37 AM    Potassium 3.9 12/03/2021 10:37 AM    Chloride 105 12/03/2021 10:37 AM    CO2 31 12/03/2021 10:37 AM    Anion gap 4 12/03/2021 10:37 AM    Glucose 95 12/03/2021 10:37 AM    BUN 19 (H) 12/03/2021 10:37 AM    Creatinine 1.02 12/03/2021 10:37 AM    BUN/Creatinine ratio 19 12/03/2021 10:37 AM    GFR est AA >60 12/03/2021 10:37 AM    GFR est non-AA 53 (L) 12/03/2021 10:37 AM    Calcium 9.6 12/03/2021 10:37 AM    Bilirubin, total 0.6 12/03/2021 10:37 AM    ALT (SGPT) 19 12/03/2021 10:37 AM    Alk. phosphatase 116 12/03/2021 10:37 AM    Protein, total 6.9 12/03/2021 10:37 AM    Albumin 3.7 12/03/2021 10:37 AM    Globulin 3.2 12/03/2021 10:37 AM    A-G Ratio 1.2 12/03/2021 10:37 AM      Lab Results   Component Value Date/Time    Hemoglobin A1c 5.6 12/03/2021 10:37 AM            Assessment / Plan     Hypertension well controlled, on Cozaar 25 mg and HCTZ 25 mg,   Hyperlipidemia well controlled on Zocor 20 mg daily      ICD-10-CM ICD-9-CM    1. Primary hypertension  V84 759.3 METABOLIC PANEL, COMPREHENSIVE      MICROALBUMIN, UR, RAND W/ MICROALB/CREAT RATIO   2. Hyperlipidemia LDL goal <100  E78.5 272.4 LIPID PANEL   3. Stage 3a chronic kidney disease (HCC)  N18.31 585.3  stable. We will continue to monitor   4.  Major depression in complete remission [facility])  F32.5 296.26  patient doing well on Lexapro 10 mg daily   5. Prediabetes  R73.03 790.29  resolved with weight loss we will continue to monitor HEMOGLOBIN A1C W/O EAG   6. Centrilobular emphysema (HCC)  J43.2 492.8  patient doing well on Anoro Ellipta               Low cholesterol diet, weight control and daily exercise discussed. Follow-up and Dispositions    · Return in about 6 months (around 6/10/2022) for labs 1 week before. Reviewed plan of care. Patient has provided input and agrees with goals.

## 2021-12-10 NOTE — PATIENT INSTRUCTIONS
High Blood Pressure: Care Instructions  Overview     It's normal for blood pressure to go up and down throughout the day. But if it stays up, you have high blood pressure. Another name for high blood pressure is hypertension. Despite what a lot of people think, high blood pressure usually doesn't cause headaches or make you feel dizzy or lightheaded. It usually has no symptoms. But it does increase your risk of stroke, heart attack, and other problems. You and your doctor will talk about your risks of these problems based on your blood pressure. Your doctor will give you a goal for your blood pressure. Your goal will be based on your health and your age. Lifestyle changes, such as eating healthy and being active, are always important to help lower blood pressure. You might also take medicine to reach your blood pressure goal.  Follow-up care is a key part of your treatment and safety. Be sure to make and go to all appointments, and call your doctor if you are having problems. It's also a good idea to know your test results and keep a list of the medicines you take. How can you care for yourself at home? Medical treatment  · If you stop taking your medicine, your blood pressure will go back up. You may take one or more types of medicine to lower your blood pressure. Be safe with medicines. Take your medicine exactly as prescribed. Call your doctor if you think you are having a problem with your medicine. · Talk to your doctor before you start taking aspirin every day. Aspirin can help certain people lower their risk of a heart attack or stroke. But taking aspirin isn't right for everyone, because it can cause serious bleeding. · See your doctor regularly. You may need to see the doctor more often at first or until your blood pressure comes down. · If you are taking blood pressure medicine, talk to your doctor before you take decongestants or anti-inflammatory medicine, such as ibuprofen.  Some of these medicines can raise blood pressure. · Learn how to check your blood pressure at home. Lifestyle changes  · Stay at a healthy weight. This is especially important if you put on weight around the waist. Losing even 10 pounds can help you lower your blood pressure. · If your doctor recommends it, get more exercise. Walking is a good choice. Bit by bit, increase the amount you walk every day. Try for at least 30 minutes on most days of the week. You also may want to swim, bike, or do other activities. · Avoid or limit alcohol. Talk to your doctor about whether you can drink any alcohol. · Try to limit how much sodium you eat to less than 2,300 milligrams (mg) a day. Your doctor may ask you to try to eat less than 1,500 mg a day. · Eat plenty of fruits (such as bananas and oranges), vegetables, legumes, whole grains, and low-fat dairy products. · Lower the amount of saturated fat in your diet. Saturated fat is found in animal products such as milk, cheese, and meat. Limiting these foods may help you lose weight and also lower your risk for heart disease. · Do not smoke. Smoking increases your risk for heart attack and stroke. If you need help quitting, talk to your doctor about stop-smoking programs and medicines. These can increase your chances of quitting for good. When should you call for help? Call 911  anytime you think you may need emergency care. This may mean having symptoms that suggest that your blood pressure is causing a serious heart or blood vessel problem. Your blood pressure may be over 180/120. For example, call 911 if:    · You have symptoms of a heart attack. These may include:  ? Chest pain or pressure, or a strange feeling in the chest.  ? Sweating. ? Shortness of breath. ? Nausea or vomiting. ? Pain, pressure, or a strange feeling in the back, neck, jaw, or upper belly or in one or both shoulders or arms. ? Lightheadedness or sudden weakness.   ? A fast or irregular heartbeat.     · You have symptoms of a stroke. These may include:  ? Sudden numbness, tingling, weakness, or loss of movement in your face, arm, or leg, especially on only one side of your body. ? Sudden vision changes. ? Sudden trouble speaking. ? Sudden confusion or trouble understanding simple statements. ? Sudden problems with walking or balance. ? A sudden, severe headache that is different from past headaches.     · You have severe back or belly pain. Do not wait until your blood pressure comes down on its own. Get help right away. Call your doctor now or seek immediate care if:    · Your blood pressure is much higher than normal (such as 180/120 or higher), but you don't have symptoms.     · You think high blood pressure is causing symptoms, such as:  ? Severe headache.  ? Blurry vision. Watch closely for changes in your health, and be sure to contact your doctor if:    · Your blood pressure measures higher than your doctor recommends at least 2 times. That means the top number is higher or the bottom number is higher, or both.     · You think you may be having side effects from your blood pressure medicine. Where can you learn more? Go to http://www.gray.com/  Enter [id number] in the search box to learn more about \"High Blood Pressure: Care Instructions. \"  Current as of: April 29, 2021               Content Version: 13.0  © 2006-2021 [employer]. Care instructions adapted under license by [employer] (which disclaims liability or warranty for this information). If you have questions about a medical condition or this instruction, always ask your healthcare professional. [place] [address] any warranty or liability for your use of this information.

## 2021-12-28 RX ORDER — SIMVASTATIN 20 MG/1
TABLET, FILM COATED ORAL
Qty: 90 TABLET | Refills: 1 | Status: SHIPPED | OUTPATIENT
Start: 2021-12-28 | End: 2022-06-24

## 2022-01-21 DIAGNOSIS — R05.3 PERSISTENT COUGH: ICD-10-CM

## 2022-01-21 RX ORDER — LOSARTAN POTASSIUM 25 MG/1
TABLET ORAL
Qty: 90 TABLET | Refills: 3 | Status: SHIPPED | OUTPATIENT
Start: 2022-01-21

## 2022-02-01 DIAGNOSIS — H81.13 BENIGN PAROXYSMAL POSITIONAL VERTIGO DUE TO BILATERAL VESTIBULAR DISORDER: ICD-10-CM

## 2022-02-01 RX ORDER — MECLIZINE HYDROCHLORIDE 25 MG/1
TABLET ORAL
Qty: 60 TABLET | Refills: 4 | Status: SHIPPED | OUTPATIENT
Start: 2022-02-01

## 2022-02-17 RX ORDER — HYDROCHLOROTHIAZIDE 25 MG/1
TABLET ORAL
Qty: 90 TABLET | Refills: 1 | Status: SHIPPED | OUTPATIENT
Start: 2022-02-17 | End: 2022-08-19

## 2022-03-18 PROBLEM — E66.01 OBESITY, MORBID (HCC): Status: ACTIVE | Noted: 2020-07-23

## 2022-03-19 PROBLEM — M54.31 SCIATICA OF RIGHT SIDE: Status: ACTIVE | Noted: 2018-12-11

## 2022-03-19 PROBLEM — F32.5 MAJOR DEPRESSION IN COMPLETE REMISSION (HCC): Status: ACTIVE | Noted: 2019-07-22

## 2022-03-19 PROBLEM — I10 PRIMARY HYPERTENSION: Status: ACTIVE | Noted: 2018-06-05

## 2022-03-19 PROBLEM — M25.551 PAIN OF RIGHT HIP JOINT: Status: ACTIVE | Noted: 2020-01-27

## 2022-03-19 PROBLEM — E66.9 OBESITY (BMI 30-39.9): Status: ACTIVE | Noted: 2018-06-05

## 2022-03-19 PROBLEM — E78.5 HYPERLIPIDEMIA LDL GOAL <100: Status: ACTIVE | Noted: 2017-05-31

## 2022-03-20 PROBLEM — F32.1 CURRENT MODERATE EPISODE OF MAJOR DEPRESSIVE DISORDER WITHOUT PRIOR EPISODE (HCC): Status: ACTIVE | Noted: 2019-06-10

## 2022-04-09 RX ORDER — ESCITALOPRAM OXALATE 10 MG/1
TABLET ORAL
Qty: 90 TABLET | Refills: 3 | Status: SHIPPED | OUTPATIENT
Start: 2022-04-09

## 2022-06-06 ENCOUNTER — APPOINTMENT (OUTPATIENT)
Dept: INTERNAL MEDICINE CLINIC | Age: 77
End: 2022-06-06

## 2022-06-06 ENCOUNTER — HOSPITAL ENCOUNTER (OUTPATIENT)
Dept: LAB | Age: 77
Discharge: HOME | End: 2022-06-06
Payer: COMMERCIAL

## 2022-06-06 DIAGNOSIS — I10 PRIMARY HYPERTENSION: ICD-10-CM

## 2022-06-06 DIAGNOSIS — R73.03 PREDIABETES: ICD-10-CM

## 2022-06-06 DIAGNOSIS — E78.5 HYPERLIPIDEMIA LDL GOAL <100: ICD-10-CM

## 2022-06-06 LAB
ALBUMIN SERPL-MCNC: 3.8 G/DL (ref 3.4–5)
ALBUMIN/GLOB SERPL: 1.3 {RATIO} (ref 0.8–1.7)
ALP SERPL-CCNC: 100 U/L (ref 45–117)
ALT SERPL-CCNC: 18 U/L (ref 13–56)
ANION GAP SERPL CALC-SCNC: 2 MMOL/L (ref 3–18)
AST SERPL-CCNC: 15 U/L (ref 10–38)
BILIRUB SERPL-MCNC: 0.5 MG/DL (ref 0.2–1)
BUN SERPL-MCNC: 23 MG/DL (ref 7–18)
BUN/CREAT SERPL: 25 (ref 12–20)
CALCIUM SERPL-MCNC: 9.6 MG/DL (ref 8.5–10.1)
CHLORIDE SERPL-SCNC: 107 MMOL/L (ref 100–111)
CHOLEST SERPL-MCNC: 152 MG/DL
CO2 SERPL-SCNC: 31 MMOL/L (ref 21–32)
CREAT SERPL-MCNC: 0.93 MG/DL (ref 0.6–1.3)
CREAT UR-MCNC: 45 MG/DL (ref 30–125)
GLOBULIN SER CALC-MCNC: 2.9 G/DL (ref 2–4)
GLUCOSE SERPL-MCNC: 86 MG/DL (ref 74–99)
HBA1C MFR BLD: 5.7 % (ref 4.2–5.6)
HDLC SERPL-MCNC: 51 MG/DL (ref 40–60)
HDLC SERPL: 3 {RATIO} (ref 0–5)
LDLC SERPL CALC-MCNC: 69.6 MG/DL (ref 0–100)
LIPID PROFILE,FLP: ABNORMAL
MICROALBUMIN UR-MCNC: 0.92 MG/DL (ref 0–3)
MICROALBUMIN/CREAT UR-RTO: 20 MG/G (ref 0–30)
POTASSIUM SERPL-SCNC: 4.4 MMOL/L (ref 3.5–5.5)
PROT SERPL-MCNC: 6.7 G/DL (ref 6.4–8.2)
SODIUM SERPL-SCNC: 140 MMOL/L (ref 136–145)
TRIGL SERPL-MCNC: 157 MG/DL (ref ?–150)
VLDLC SERPL CALC-MCNC: 31.4 MG/DL

## 2022-06-06 PROCEDURE — 82043 UR ALBUMIN QUANTITATIVE: CPT

## 2022-06-06 PROCEDURE — 80061 LIPID PANEL: CPT

## 2022-06-06 PROCEDURE — 80053 COMPREHEN METABOLIC PANEL: CPT

## 2022-06-06 PROCEDURE — 36415 COLL VENOUS BLD VENIPUNCTURE: CPT

## 2022-06-06 PROCEDURE — 83036 HEMOGLOBIN GLYCOSYLATED A1C: CPT

## 2022-06-17 ENCOUNTER — OFFICE VISIT (OUTPATIENT)
Dept: INTERNAL MEDICINE CLINIC | Age: 77
End: 2022-06-17
Payer: COMMERCIAL

## 2022-06-17 VITALS
HEART RATE: 60 BPM | TEMPERATURE: 97.3 F | SYSTOLIC BLOOD PRESSURE: 137 MMHG | HEIGHT: 64 IN | OXYGEN SATURATION: 97 % | RESPIRATION RATE: 18 BRPM | BODY MASS INDEX: 35.51 KG/M2 | WEIGHT: 208 LBS | DIASTOLIC BLOOD PRESSURE: 52 MMHG

## 2022-06-17 DIAGNOSIS — F32.5 MAJOR DEPRESSIVE DISORDER IN FULL REMISSION, UNSPECIFIED WHETHER RECURRENT (HCC): ICD-10-CM

## 2022-06-17 DIAGNOSIS — E66.01 SEVERE OBESITY (BMI 35.0-39.9) WITH COMORBIDITY (HCC): ICD-10-CM

## 2022-06-17 DIAGNOSIS — J43.2 CENTRILOBULAR EMPHYSEMA (HCC): ICD-10-CM

## 2022-06-17 DIAGNOSIS — R73.03 PREDIABETES: ICD-10-CM

## 2022-06-17 DIAGNOSIS — E78.5 HYPERLIPIDEMIA LDL GOAL <100: ICD-10-CM

## 2022-06-17 DIAGNOSIS — N18.31 STAGE 3A CHRONIC KIDNEY DISEASE (HCC): ICD-10-CM

## 2022-06-17 DIAGNOSIS — I10 PRIMARY HYPERTENSION: Primary | ICD-10-CM

## 2022-06-17 PROBLEM — N18.30 CHRONIC RENAL DISEASE, STAGE III (HCC): Status: ACTIVE | Noted: 2022-06-17

## 2022-06-17 PROCEDURE — G8427 DOCREV CUR MEDS BY ELIG CLIN: HCPCS | Performed by: INTERNAL MEDICINE

## 2022-06-17 PROCEDURE — 1090F PRES/ABSN URINE INCON ASSESS: CPT | Performed by: INTERNAL MEDICINE

## 2022-06-17 PROCEDURE — G8399 PT W/DXA RESULTS DOCUMENT: HCPCS | Performed by: INTERNAL MEDICINE

## 2022-06-17 PROCEDURE — G8417 CALC BMI ABV UP PARAM F/U: HCPCS | Performed by: INTERNAL MEDICINE

## 2022-06-17 PROCEDURE — G8536 NO DOC ELDER MAL SCRN: HCPCS | Performed by: INTERNAL MEDICINE

## 2022-06-17 PROCEDURE — 99214 OFFICE O/P EST MOD 30 MIN: CPT | Performed by: INTERNAL MEDICINE

## 2022-06-17 PROCEDURE — 1123F ACP DISCUSS/DSCN MKR DOCD: CPT | Performed by: INTERNAL MEDICINE

## 2022-06-17 PROCEDURE — G8754 DIAS BP LESS 90: HCPCS | Performed by: INTERNAL MEDICINE

## 2022-06-17 NOTE — PROGRESS NOTES
Patient is in the office today for a 6 month follow up. Patient states she is having neck pain. Patient states she is also having right hip pain and radiates down right leg. Do you have an Advance Directive no  Do you want more information : information given     1. \"Have you been to the ER, urgent care clinic since your last visit? Hospitalized since your last visit? \" No    2. \"Have you seen or consulted any other health care providers outside of the [address] [place] [address] since your last visit? \" No     3. For patients aged 39-70: Has the patient had a colonoscopy / FIT/ Cologuard? NA - based on age      If the patient is female:    4. For patients aged 41-77: Has the patient had a mammogram within the past 2 years? NA - based on age or sex      11. For patients aged 21-65: Has the patient had a pap smear?  NA - based on age or sex

## 2022-06-17 NOTE — PATIENT INSTRUCTIONS
High Blood Pressure: Care Instructions  Overview     It's normal for blood pressure to go up and down throughout the day. But if it stays up, you have high blood pressure. Another name for high blood pressure is hypertension. Despite what a lot of people think, high blood pressure usually doesn't cause headaches or make you feel dizzy or lightheaded. It usually has no symptoms. But it does increase your risk of stroke, heart attack, and other problems. You and your doctor will talk about your risks of these problems based on your blood pressure. Your doctor will give you a goal for your blood pressure. Your goal will be based on your health and your age. Lifestyle changes, such as eating healthy and being active, are always important to help lower blood pressure. You might also take medicine to reach your blood pressure goal.  Follow-up care is a key part of your treatment and safety. Be sure to make and go to all appointments, and call your doctor if you are having problems. It's also a good idea to know your test results and keep a list of the medicines you take. How can you care for yourself at home? Medical treatment  · If you stop taking your medicine, your blood pressure will go back up. You may take one or more types of medicine to lower your blood pressure. Be safe with medicines. Take your medicine exactly as prescribed. Call your doctor if you think you are having a problem with your medicine. · Talk to your doctor before you start taking aspirin every day. Aspirin can help certain people lower their risk of a heart attack or stroke. But taking aspirin isn't right for everyone, because it can cause serious bleeding. · See your doctor regularly. You may need to see the doctor more often at first or until your blood pressure comes down. · If you are taking blood pressure medicine, talk to your doctor before you take decongestants or anti-inflammatory medicine, such as ibuprofen.  Some of these medicines can raise blood pressure. · Learn how to check your blood pressure at home. Lifestyle changes  · Stay at a healthy weight. This is especially important if you put on weight around the waist. Losing even 10 pounds can help you lower your blood pressure. · If your doctor recommends it, get more exercise. Walking is a good choice. Bit by bit, increase the amount you walk every day. Try for at least 30 minutes on most days of the week. You also may want to swim, bike, or do other activities. · Avoid or limit alcohol. Talk to your doctor about whether you can drink any alcohol. · Try to limit how much sodium you eat to less than 2,300 milligrams (mg) a day. Your doctor may ask you to try to eat less than 1,500 mg a day. · Eat plenty of fruits (such as bananas and oranges), vegetables, legumes, whole grains, and low-fat dairy products. · Lower the amount of saturated fat in your diet. Saturated fat is found in animal products such as milk, cheese, and meat. Limiting these foods may help you lose weight and also lower your risk for heart disease. · Do not smoke. Smoking increases your risk for heart attack and stroke. If you need help quitting, talk to your doctor about stop-smoking programs and medicines. These can increase your chances of quitting for good. When should you call for help? Call 911  anytime you think you may need emergency care. This may mean having symptoms that suggest that your blood pressure is causing a serious heart or blood vessel problem. Your blood pressure may be over 180/120. For example, call 911 if:    · You have symptoms of a heart attack. These may include:  ? Chest pain or pressure, or a strange feeling in the chest.  ? Sweating. ? Shortness of breath. ? Nausea or vomiting. ? Pain, pressure, or a strange feeling in the back, neck, jaw, or upper belly or in one or both shoulders or arms. ? Lightheadedness or sudden weakness.   ? A fast or irregular heartbeat.     · You have symptoms of a stroke. These may include:  ? Sudden numbness, tingling, weakness, or loss of movement in your face, arm, or leg, especially on only one side of your body. ? Sudden vision changes. ? Sudden trouble speaking. ? Sudden confusion or trouble understanding simple statements. ? Sudden problems with walking or balance. ? A sudden, severe headache that is different from past headaches.     · You have severe back or belly pain. Do not wait until your blood pressure comes down on its own. Get help right away. Call your doctor now or seek immediate care if:    · Your blood pressure is much higher than normal (such as 180/120 or higher), but you don't have symptoms.     · You think high blood pressure is causing symptoms, such as:  ? Severe headache.  ? Blurry vision. Watch closely for changes in your health, and be sure to contact your doctor if:    · Your blood pressure measures higher than your doctor recommends at least 2 times. That means the top number is higher or the bottom number is higher, or both.     · You think you may be having side effects from your blood pressure medicine. Where can you learn more? Go to http://www.gray.com/  Enter [id number] in the search box to learn more about \"High Blood Pressure: Care Instructions. \"  Current as of: January 10, 2022               Content Version: 13.2  © 2006-2022 [employer]. Care instructions adapted under license by [employer] (which disclaims liability or warranty for this information). If you have questions about a medical condition or this instruction, always ask your healthcare professional. [place] [address] any warranty or liability for your use of this information.

## 2022-06-22 NOTE — PROGRESS NOTES
Subjective:       Chief Complaint  The patient presents for follow up of hypertension and high cholesterol. Depression         HPI  Octavio Jackson is a 68 y.o. female seen for follow up of hyperlipidemia. Shealso has hypertension. Hypertension no significant medication side effects noted, well controlled, on HCTZ 25 mg and Cozaar 25 mg, patient has chronic kidney disease stage III which we will continue to monitor closely, hyperlipidemia well controlled, no significant medication side effects noted, patient is now back on Zocor 20 mg daily. Diet and Lifestyle: generally follows a low fat low cholesterol diet, sedentary    Home BP Monitoring: pt will try to monitor at home on a more regular basis . Other symptoms and concerns: Patient has a history of depression for which she uses Lexapro 10 mg daily with good improvement. COPD  Patient complains of dyspnea. Symptoms began several years ago. Symptoms chronic dyspnea: severity = moderate: course of sx: gradually worsening does worsen with exertion. Sputum is clear in moderate amounts. Patient uses 1 pillows at night. Patient can walk 100 feet before resting. Patient currently is not on home oxygen therapy. Ferny Rodriguez Respiratory history: history of 60 pack years tobacco use stopping 1983. Pt currently using Anoro Ellpita with much improvement in breathing and no longer needs albuterol      Patient has prediabetes which has been to be exacerbated with weight gain. Her BMI has increased back to 35. Ferny Rodriguez Patient has a history of gastric bypass. Patient has a history of goiter for which she has seen endocrine in the past but has not had any change in the size. Discussed the patient's BMI with her. The BMI follow up plan is as follows: I have counseled this patient on diet and exercise regimens.   Patient already had a gastric bypass in the past.      Current Outpatient Medications   Medication Sig    escitalopram oxalate (LEXAPRO) 10 mg tablet TAKE 1 TABLET BY MOUTH EVERY DAY    hydroCHLOROthiazide (HYDRODIURIL) 25 mg tablet TAKE 1 TABLET BY MOUTH EVERY DAY    meclizine (ANTIVERT) 25 mg tablet TAKE 1 TABLET BY MOUTH EVERY 8 HOURS AS NEEDED FOR VERTIGO    losartan (COZAAR) 25 mg tablet TAKE 1 TABLET BY MOUTH EVERY DAY    simvastatin (ZOCOR) 20 mg tablet TAKE 1 TABLET BY MOUTH EVERY DAY IN THE EVENING    solifenacin (VESICARE) 10 mg tablet Take 1 Tablet by mouth daily.  albuterol (PROVENTIL HFA, VENTOLIN HFA, PROAIR HFA) 90 mcg/actuation inhaler Take 1-2 Puffs by inhalation every four (4) hours as needed for Wheezing.  estradiol (ESTRACE) 0.01 % (0.1 mg/gram) vaginal cream Apply 0.5 grams with fingertip to urethral opening twice weekly.  cranberry extract 450 mg tab Take  by mouth.  inhalational spacing device 1 Each by Does Not Apply route as needed.  cholecalciferol, vitamin d3, (VITAMIN D) 1,000 unit tablet Take 1,000 Units by mouth two (2) times a day.  CALCIUM CARBONATE/VITAMIN D3 (OS- + D PO) Take 1 Cap by mouth two (2) times a day.  POLYETHYLENE GLYCOL 3350 (MIRALAX PO) Take  by mouth nightly.  omeprazole (PRILOSEC) 20 mg capsule Take 20 mg by mouth daily.  trimethoprim-sulfamethoxazole (BACTRIM DS, SEPTRA DS) 160-800 mg per tablet Take 1 Tablet by mouth two (2) times a day. (Patient not taking: Reported on 6/17/2022)    Anoro Ellipta 62.5-25 mcg/actuation inhaler INHALE 1 PUFF BY INHALATION EVERY DAY (Patient not taking: Reported on 12/10/2021)    ondansetron hcl (ZOFRAN) 4 mg tablet Take 1 Tab by mouth every eight (8) hours as needed. (Patient not taking: Reported on 6/17/2022)    cyanocobalamin (VITAMIN B-12) 500 mcg tablet Take 500 mcg by mouth daily. (Patient not taking: Reported on 12/10/2021)     No current facility-administered medications for this visit.              Review of Systems  Respiratory: negative for dyspnea on exertion  Cardiovascular: negative for chest pain    Objective:     Visit Vitals  BP (!) 137/52 (BP 1 Location: Right arm, BP Patient Position: Sitting, BP Cuff Size: Adult)   Pulse 60   Temp 97.3 °F (36.3 °C) (Temporal)   Resp 18   Ht 5' 4\" (1.626 m)   Wt 208 lb (94.3 kg)   SpO2 97%   BMI 35.70 kg/m²        Eyes - pupils equal and reactive, extraocular eye movements intact  Chest - clear to auscultation, no wheezes, rales or rhonchi, symmetric air entry  Heart - normal rate, regular rhythm, normal S1, S2, no murmurs, rubs, clicks or gallops  Extremities - peripheral pulses normal, no pedal edema, no clubbing or cyanosis      Labs:   Lab Results   Component Value Date/Time    Cholesterol, total 152 06/06/2022 09:41 AM    HDL Cholesterol 51 06/06/2022 09:41 AM    LDL, calculated 69.6 06/06/2022 09:41 AM    Triglyceride 157 (H) 06/06/2022 09:41 AM    CHOL/HDL Ratio 3.0 06/06/2022 09:41 AM     Lab Results   Component Value Date/Time    Sodium 140 06/06/2022 09:41 AM    Potassium 4.4 06/06/2022 09:41 AM    Chloride 107 06/06/2022 09:41 AM    CO2 31 06/06/2022 09:41 AM    Anion gap 2 (L) 06/06/2022 09:41 AM    Glucose 86 06/06/2022 09:41 AM    BUN 23 (H) 06/06/2022 09:41 AM    Creatinine 0.93 06/06/2022 09:41 AM    BUN/Creatinine ratio 25 (H) 06/06/2022 09:41 AM    GFR est AA >60 06/06/2022 09:41 AM    GFR est non-AA 59 (L) 06/06/2022 09:41 AM    Calcium 9.6 06/06/2022 09:41 AM    Bilirubin, total 0.5 06/06/2022 09:41 AM    ALT (SGPT) 18 06/06/2022 09:41 AM    Alk. phosphatase 100 06/06/2022 09:41 AM    Protein, total 6.7 06/06/2022 09:41 AM    Albumin 3.8 06/06/2022 09:41 AM    Globulin 2.9 06/06/2022 09:41 AM    A-G Ratio 1.3 06/06/2022 09:41 AM      Lab Results   Component Value Date/Time    Hemoglobin A1c 5.7 (H) 06/06/2022 09:41 AM            Assessment / Plan     Hypertension well controlled, on Cozaar 25 mg and HCTZ 25 mg,   Hyperlipidemia well controlled on Zocor 20 mg daily       ICD-10-CM ICD-9-CM    1. Primary hypertension  H42 886.2 METABOLIC PANEL, COMPREHENSIVE   2.  Hyperlipidemia LDL goal <100  E78.5 272.4 LIPID PANEL   3. Prediabetes  R73.03 790.29  borderline control with weight gain. Patient will work on cutting back starches and sugar in her diet HEMOGLOBIN A1C W/O EAG   4. Stage 3a chronic kidney disease (HCC)  N18.31 585.3  we will continue to monitor closely. Patient to try monitor blood pressure at home so we can make sure her systolic blood pressure is under 130 to prevent progression of her chronic kidney disease   5. Centrilobular emphysema ([facility] [address]  J43.2 492.8  well-controlled on Anoro Ellipta. We will continue to monitor every 6 months   6. 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 Patient has no side effects from the medication. 7. Severe obesity (BMI 35.0-39. 9) with comorbidity ([address]  E66.01 278.01  patient is status post gastric bypass and will continue to work on trying to lose weight by cutting back's sugar and starches in diet. Low cholesterol diet, weight control and daily exercise discussed. Follow-up and Dispositions    · Return in about 6 months (around 12/17/2022) for labs 1 week before. Reviewed plan of care. Patient has provided input and agrees with goals.

## 2022-06-24 RX ORDER — SIMVASTATIN 20 MG/1
TABLET, FILM COATED ORAL
Qty: 90 TABLET | Refills: 1 | Status: SHIPPED | OUTPATIENT
Start: 2022-06-24

## 2022-08-19 RX ORDER — HYDROCHLOROTHIAZIDE 25 MG/1
TABLET ORAL
Qty: 90 TABLET | Refills: 1 | Status: SHIPPED | OUTPATIENT
Start: 2022-08-19

## 2022-09-06 NOTE — MR AVS SNAPSHOT
[address] [facility] [address] [address] 
[phone] Patient: Lissette Martínez MRN: [id number] Visit Information Date & Time Provider Department Dept. Phone Encounter #  
 7/30/2018 10:00 AM Lani Carrero, [address] [phone] [id number] Your Appointments 8/20/2018 10:15 AM  
Office Visit with Lani Carrero MD  
[facility]) Appt Note: checkup [address] [facility] [address] [address]  
[phone] [address] [id number]  
  
    
 12/4/2018  8:25 AM  
LAB with [facility] NURSE VISIT [facility]) Appt Note: lab  
 [address], [address] [facility] [address]  
  
   
 [address], [address]  
  
    
 12/11/2018 10:00 AM  
Office Visit with Trenton Uriarte MD  
[facility]) Appt Note: 6 month f/u  
 [address] [facility], [address] [id number] [address] [place]  
  
   
 [address], [address] Upcoming Health Maintenance Date Due Influenza Age 5 to Adult 8/1/2018 BREAST CANCER SCRN MAMMOGRAM 1/12/2020 GLAUCOMA SCREENING Q2Y 6/4/2020 COLONOSCOPY 10/10/2023 DTaP/Tdap/Td series (2 - Td) 11/25/2026 Allergies as of 7/30/2018  Review Complete On: 7/30/2018 By: Samra Lynne LPN Severity Noted Reaction Type Reactions Macrobid [Nitrofurantoin Monohyd/m-cryst] High 09/25/2015   Not Verified Anaphylaxis Aspirin  07/16/2015    Unknown (comments) Cannot take due to gastric bypass. Lisinopril  08/12/2014    Cough Parafon Forte Dsc [Chlorzoxazone]  10/20/2011   Side Effect Other (comments) Excessive drowsiness Current Immunizations  Reviewed on 8/12/2014 Name Date H1N1 FLU VACCINE 11/3/2009 Influenza High Dose Vaccine PF 10/15/2016, 10/14/2015, 10/7/2014 Influenza Vaccine PF 10/30/2013 Influenza Vaccine Split 11/6/2012, 11/9/2011 10:35 AM  
 Influenza Vaccine Whole 9/30/2010 Pneumococcal Conjugate (PCV-13) 8/17/2015  8:01 AM  
 Td 1/1/2007 Tdap 11/25/2016 ZZZ-RETIRED (DO NOT USE) Pneumococcal Vaccine (Unspecified Type) 9/30/2010 Zoster Vaccine, Live 1/1/2009 Not reviewed this visit You Were Diagnosed With   
  
 Codes Comments Urine frequency    -  Primary ICD-10-CM: R35.0 ICD-9-CM: 788.41 Atrophic vaginitis     ICD-10-CM: N95.2 ICD-9-CM: 627.3 Vitals BP Pulse Height(growth percentile) Weight(growth percentile) SpO2 BMI  
 134/58 (BP 1 Location: Left arm, BP Patient Position: Sitting) 80 5' 4\" (1.626 m) 224 lb (101.6 kg) 97% 38.45 kg/m2 OB Status Smoking Status Hysterectomy Former Smoker Vitals History BMI and BSA Data Body Mass Index Body Surface Area  
 38.45 kg/m 2 2.14 m 2 Preferred Pharmacy Pharmacy Name Phone [address] [phone] Your Updated Medication List  
  
   
This list is accurate as of 7/30/18 11:19 AM.  Always use your most recent med list.  
  
  
  
  
 albuterol 90 mcg/actuation inhaler Commonly known as:  PROVENTIL HFA, VENTOLIN HFA, PROAIR HFA Take 1-2 Puffs by inhalation every four (4) hours as needed for Wheezing. cranberry extract 450 mg Tab tablet Take  by mouth. cyclobenzaprine 10 mg tablet Commonly known as:  FLEXERIL Take 1 Tab by mouth three (3) times daily as needed for Muscle Spasm(s). diazePAM 2 mg tablet Commonly known as:  VALIUM Take 1 Tab by mouth every six (6) hours as needed for Anxiety.  Max Daily Amount: 8 mg.  
  
 estradiol 0.01 % (0.1 mg/gram) vaginal cream  
Commonly known as:  ESTRACE  
 Apply 0.5 grams with fingertip to urethral opening twice weekly. hydroCHLOROthiazide 25 mg tablet Commonly known as:  HYDRODIURIL Take 1 Tab by mouth daily. inhalational spacing device 1 Each by Does Not Apply route as needed. losartan 25 mg tablet Commonly known as:  COZAAR Take 1 Tab by mouth daily. mirabegron ER 25 mg ER tablet Commonly known as:  MYRBETRIQ Take 1 Tab by mouth daily. MIRALAX PO Take  by mouth nightly. ondansetron hcl 4 mg tablet Commonly known as:  Margurette Ran Take 1 Tab by mouth every eight (8) hours as needed. OS- + D PO Take 1 Cap by mouth two (2) times a day. PriLOSEC 20 mg capsule Generic drug:  omeprazole Take 20 mg by mouth daily. simvastatin 20 mg tablet Commonly known as:  ZOCOR  
TAKE ONE TABLET BY MOUTH IN THE EVENING  
  
 traMADol 50 mg tablet Commonly known as:  ULTRAM  
Take 1 Tab by mouth every six (6) hours as needed for Pain. VITAMIN B-12 500 mcg tablet Generic drug:  cyanocobalamin Take 500 mcg by mouth daily. VITAMIN D3 1,000 unit tablet Generic drug:  cholecalciferol Take 1,000 Units by mouth two (2) times a day. We Performed the Following AMB POC URINALYSIS DIP STICK AUTO W/O MICRO [27377 CPT(R)] Patient Instructions Urine Test: About This Test 
What is it? A urine test checks the color, clarity (clear or cloudy), odor, concentration, and acidity (pH) of your urine. It also checks your levels of protein, sugar, blood cells, or other substances in your urine. This test is sometimes called a urinalysis. Why is this test done? A urine test may be done: · To check for a disease or infection of the urinary tract. The urinary tract includes the kidneys, the tubes that carry urine from the kidneys to the bladder (ureters), and the bladder. It also includes the tube that carries urine from the bladder to outside the body (urethra). · To check the treatment of conditions such as diabetes, kidney stones, a urinary tract infection (UTI), high blood pressure, or some kidney or liver diseases. How can you prepare for the test? 
· Before the test, don't eat foods that can change the color of your urine. Examples of these include blackberries, beets, and rhubarb. · Don't do heavy exercise before the test. 
· Tell your doctor if you are menstruating or close to starting your period. Your doctor may want to wait to do the test. 
· Tell your doctor about all the nonprescription and prescription medicines and herbs or other supplements you take. Some of these can affect the results of this test. 
What happens during the test? 
A urine test can be done in your doctor's office, clinic, or lab. Or you may be asked to collect a urine sample at home. Then you can take it to the office or lab for testing. Clean-catch midstream urine collection · Wash your hands before you start. · If the cup you are given has a lid, remove it carefully. Set it down with the inner surface up. Don't touch the inside of the cup with your fingers. · Clean the area around your genitals. ¨ For men: Pull back the foreskin, if present. Clean the head of your penis with medicated towelettes or swabs. ¨ For women: Spread open the genital folds of skin with one hand. Then use medicated towelettes or swabs in your other hand to clean the area where urine comes out (the urethra). Wipe the area from front to back. · Start urinating into the toilet or urinal. A woman should hold apart the genital folds of skin while she urinates. · After the urine has flowed for several seconds, place the cup into the urine stream. Collect about 2 ounces of urine without stopping your flow of urine. · Don't touch the rim of the cup to your genital area. Don't get toilet paper, pubic hair, stool (feces), menstrual blood, or anything else in the urine sample. · Finish urinating into the toilet or urinal. 
· Carefully replace and tighten the lid on the cup, and then return it to the lab. If you are collecting the urine at home and can't get it to the lab in an hour, refrigerate it. Double-voided urine sample collection This method collects the urine your body is making right now. · Urinate into the toilet or urinal. Don't collect any of this urine. · Drink a large glass of water, and wait about 30 to 40 minutes. · Then get a urine sample. Follow the instructions above for collecting a clean-catch urine sample. · Take the urine sample to the lab. If you are collecting the urine at home and can't get it to the lab in an hour, refrigerate it. Follow-up care is a key part of your treatment and safety. Be sure to make and go to all appointments, and call your doctor if you are having problems. It's also a good idea to keep a list of the medicines you take. Ask your doctor when you can expect to have your test results. Where can you learn more? Go to http://josé-efren.info/. Enter R266 in the search box to learn more about \"Urine Test: About This Test.\" Current as of: October 9, 2017 Content Version: 11.7 © 6096-1440 [employer]. Care instructions adapted under license by [employer] (which disclaims liability or warranty for this information). If you have questions about a medical condition or this instruction, always ask your healthcare professional. [place] [address] any warranty or liability for your use of this information. Introducing [facility]! [facility] introduces [employer] patient portal. Now you can access parts of your medical record, email your doctor's office, and request medication refills online. 1. In your internet browser, go to [email or web address] 2. Click on the First Time User? Click Here link in the Sign In box.  You will see the New Member Sign Up page. 3. Enter your NeoVista Access Code exactly as it appears below. You will not need to use this code after youve completed the sign-up process. If you do not sign up before the expiration date, you must request a new code. · Neo[employer]ista Access Code: [email or web address] Expires: 10/14/2018  5:18 AM 
 
4. Enter the last four digits of your Social Security Number (xxxx) and Date of Birth (mm/dd/yyyy) as indicated and click Submit. You will be taken to the next sign-up page. 5. Create a DiJiPOPt ID. This will be your NeoVista login ID and cannot be changed, so think of one that is secure and easy to remember. 6. Create a NeoVista password. You can change your password at any time. 7. Enter your Password Reset Question and Answer. This can be used at a later time if you forget your password. 8. Enter your e-mail address. You will receive e-mail notification when new information is available in [address]. 9. Click Sign Up. You can now view and download portions of your medical record. 10. Click the Download Summary menu link to download a portable copy of your medical information. If you have questions, please visit the Frequently Asked Questions section of the [employer] website. Remember, Neo[employer]ista is NOT to be used for urgent needs. For medical emergencies, dial 911. Now available from your iPhone and Android! Please provide this summary of care documentation to your next provider. Your primary care clinician is listed as Kev Bustillos. If you have any questions after today's visit, please call [phone]. No

## 2022-11-14 DIAGNOSIS — R05.3 PERSISTENT COUGH: ICD-10-CM

## 2022-11-14 RX ORDER — LOSARTAN POTASSIUM 25 MG/1
TABLET ORAL
Qty: 90 TABLET | Refills: 3 | Status: SHIPPED | OUTPATIENT
Start: 2022-11-14

## 2022-11-14 RX ORDER — SIMVASTATIN 20 MG/1
TABLET, FILM COATED ORAL
Qty: 90 TABLET | Refills: 1 | Status: SHIPPED | OUTPATIENT
Start: 2022-11-14

## 2022-12-09 ENCOUNTER — APPOINTMENT (OUTPATIENT)
Dept: INTERNAL MEDICINE CLINIC | Age: 77
End: 2022-12-09

## 2022-12-09 ENCOUNTER — HOSPITAL ENCOUNTER (OUTPATIENT)
Dept: LAB | Age: 77
End: 2022-12-09
Payer: MEDICARE

## 2022-12-09 DIAGNOSIS — E78.5 HYPERLIPIDEMIA LDL GOAL <100: ICD-10-CM

## 2022-12-09 DIAGNOSIS — I10 PRIMARY HYPERTENSION: ICD-10-CM

## 2022-12-09 DIAGNOSIS — R73.03 PREDIABETES: ICD-10-CM

## 2022-12-09 LAB
ALBUMIN SERPL-MCNC: 3.8 G/DL (ref 3.4–5)
ALBUMIN/GLOB SERPL: 1.3 {RATIO} (ref 0.8–1.7)
ALP SERPL-CCNC: 122 U/L (ref 45–117)
ALT SERPL-CCNC: 17 U/L (ref 13–56)
ANION GAP SERPL CALC-SCNC: 5 MMOL/L (ref 3–18)
AST SERPL-CCNC: 11 U/L (ref 10–38)
BILIRUB SERPL-MCNC: 0.5 MG/DL (ref 0.2–1)
BUN SERPL-MCNC: 21 MG/DL (ref 7–18)
BUN/CREAT SERPL: 20 (ref 12–20)
CALCIUM SERPL-MCNC: 9.6 MG/DL (ref 8.5–10.1)
CHLORIDE SERPL-SCNC: 104 MMOL/L (ref 100–111)
CHOLEST SERPL-MCNC: 150 MG/DL
CO2 SERPL-SCNC: 30 MMOL/L (ref 21–32)
CREAT SERPL-MCNC: 1.05 MG/DL (ref 0.6–1.3)
GLOBULIN SER CALC-MCNC: 3 G/DL (ref 2–4)
GLUCOSE SERPL-MCNC: 109 MG/DL (ref 74–99)
HBA1C MFR BLD: 5.4 % (ref 4.2–5.6)
HDLC SERPL-MCNC: 61 MG/DL (ref 40–60)
HDLC SERPL: 2.5 {RATIO} (ref 0–5)
LDLC SERPL CALC-MCNC: 53.6 MG/DL (ref 0–100)
LIPID PROFILE,FLP: ABNORMAL
POTASSIUM SERPL-SCNC: 4 MMOL/L (ref 3.5–5.5)
PROT SERPL-MCNC: 6.8 G/DL (ref 6.4–8.2)
SODIUM SERPL-SCNC: 139 MMOL/L (ref 136–145)
TRIGL SERPL-MCNC: 177 MG/DL (ref ?–150)
VLDLC SERPL CALC-MCNC: 35.4 MG/DL

## 2022-12-09 PROCEDURE — 80053 COMPREHEN METABOLIC PANEL: CPT

## 2022-12-09 PROCEDURE — 80061 LIPID PANEL: CPT

## 2022-12-09 PROCEDURE — 83036 HEMOGLOBIN GLYCOSYLATED A1C: CPT

## 2022-12-09 PROCEDURE — 36415 COLL VENOUS BLD VENIPUNCTURE: CPT

## 2022-12-16 ENCOUNTER — OFFICE VISIT (OUTPATIENT)
Dept: INTERNAL MEDICINE CLINIC | Age: 77
End: 2022-12-16
Payer: COMMERCIAL

## 2022-12-16 VITALS
DIASTOLIC BLOOD PRESSURE: 45 MMHG | TEMPERATURE: 98.9 F | WEIGHT: 210 LBS | BODY MASS INDEX: 35.85 KG/M2 | RESPIRATION RATE: 18 BRPM | HEART RATE: 59 BPM | SYSTOLIC BLOOD PRESSURE: 119 MMHG | HEIGHT: 64 IN | OXYGEN SATURATION: 100 %

## 2022-12-16 DIAGNOSIS — J43.2 CENTRILOBULAR EMPHYSEMA (HCC): ICD-10-CM

## 2022-12-16 DIAGNOSIS — N18.31 STAGE 3A CHRONIC KIDNEY DISEASE (HCC): ICD-10-CM

## 2022-12-16 DIAGNOSIS — E78.5 HYPERLIPIDEMIA LDL GOAL <100: ICD-10-CM

## 2022-12-16 DIAGNOSIS — F32.5 MAJOR DEPRESSIVE DISORDER IN FULL REMISSION, UNSPECIFIED WHETHER RECURRENT (HCC): ICD-10-CM

## 2022-12-16 DIAGNOSIS — R73.03 PREDIABETES: ICD-10-CM

## 2022-12-16 DIAGNOSIS — E66.01 SEVERE OBESITY (BMI 35.0-39.9) WITH COMORBIDITY (HCC): ICD-10-CM

## 2022-12-16 DIAGNOSIS — M79.641 PAIN OF RIGHT HAND: ICD-10-CM

## 2022-12-16 DIAGNOSIS — I10 PRIMARY HYPERTENSION: Primary | ICD-10-CM

## 2022-12-16 NOTE — PROGRESS NOTES
Subjective:       Chief Complaint  The patient presents for follow up of hypertension and high cholesterol. Depression         HPI  Asa Duran is a 68 y.o. female seen for follow up of hyperlipidemia. Shealso has hypertension. Hypertension no significant medication side effects noted, well controlled, on HCTZ 25 mg and Cozaar 25 mg, patient has chronic kidney disease stage III which we will continue to monitor closely, hyperlipidemia well controlled, no significant medication side effects noted,  on Zocor 20 mg daily. Diet and Lifestyle: generally follows a low fat low cholesterol diet, sedentary    Home BP Monitoring: pt will try to monitor at home on a more regular basis . Other symptoms and concerns: Patient has a history of depression for which she uses Lexapro 10 mg daily with good improvement. COPD  Patient complains of dyspnea. Symptoms began several years ago. Symptoms chronic dyspnea: severity = moderate: course of sx: gradually worsening does worsen with exertion. Sputum is clear in moderate amounts. Patient uses 1 pillows at night. Patient can walk 100 feet before resting. Patient currently is not on home oxygen therapy. Sara Sofia Respiratory history: history of 60 pack years tobacco use stopping 1983. Pt was on Anoro Ellpita  but stopped it due to cost of medication. Patient has prediabetes which has improved with diet. Sara Sofia Her BMI at 36 puts her at risk of multiple problems. Patient has a history of gastric bypass. Patient has a history of goiter for which she has seen endocrine in the past but has not had any change in the size. Pt c/o right hand pain for several weeks. Will refer to orthopedics for further management. Discussed the patient's BMI with her. The BMI follow up plan is as follows: I have counseled this patient on diet and exercise regimens.   Patient already had a gastric bypass in the past.      Current Outpatient Medications   Medication Sig    losartan (COZAAR) 25 mg tablet TAKE 1 TABLET BY MOUTH EVERY DAY    simvastatin (ZOCOR) 20 mg tablet TAKE 1 TABLET BY MOUTH EVERY DAY IN THE EVENING    hydroCHLOROthiazide (HYDRODIURIL) 25 mg tablet TAKE 1 TABLET BY MOUTH EVERY DAY    escitalopram oxalate (LEXAPRO) 10 mg tablet TAKE 1 TABLET BY MOUTH EVERY DAY    solifenacin (VESICARE) 10 mg tablet Take 1 Tablet by mouth daily. albuterol (PROVENTIL HFA, VENTOLIN HFA, PROAIR HFA) 90 mcg/actuation inhaler Take 1-2 Puffs by inhalation every four (4) hours as needed for Wheezing. cranberry extract 450 mg tab Take  by mouth. inhalational spacing device 1 Each by Does Not Apply route as needed. cholecalciferol (VITAMIN D3) (1000 Units /25 mcg) tablet Take 1,000 Units by mouth two (2) times a day. CALCIUM CARBONATE/VITAMIN D3 (OS- + D PO) Take 1 Cap by mouth two (2) times a day. POLYETHYLENE GLYCOL 3350 (MIRALAX PO) Take  by mouth nightly. omeprazole (PRILOSEC) 20 mg capsule Take 20 mg by mouth daily. No current facility-administered medications for this visit.              Review of Systems  Respiratory: negative for dyspnea on exertion  Cardiovascular: negative for chest pain    Objective:     Visit Vitals  BP (!) 119/45 (BP 1 Location: Right arm, BP Patient Position: Sitting, BP Cuff Size: Adult)   Pulse (!) 59   Temp 98.9 °F (37.2 °C) (Temporal)   Resp 18   Ht 5' 4\" (1.626 m)   Wt 210 lb (95.3 kg)   SpO2 100%   BMI 36.05 kg/m²        Eyes - pupils equal and reactive, extraocular eye movements intact  Chest - clear to auscultation, no wheezes, rales or rhonchi, symmetric air entry  Heart - normal rate, regular rhythm, normal S1, S2, no murmurs, rubs, clicks or gallops  Extremities - peripheral pulses normal, no pedal edema, no clubbing or cyanosis      Labs:   Lab Results   Component Value Date/Time    Cholesterol, total 150 12/09/2022 10:02 AM    HDL Cholesterol 61 (H) 12/09/2022 10:02 AM    LDL, calculated 53.6 12/09/2022 10:02 AM    Triglyceride 177 (H) 12/09/2022 10:02 AM    CHOL/HDL Ratio 2.5 12/09/2022 10:02 AM     Lab Results   Component Value Date/Time    Sodium 139 12/09/2022 10:02 AM    Potassium 4.0 12/09/2022 10:02 AM    Chloride 104 12/09/2022 10:02 AM    CO2 30 12/09/2022 10:02 AM    Anion gap 5 12/09/2022 10:02 AM    Glucose 109 (H) 12/09/2022 10:02 AM    BUN 21 (H) 12/09/2022 10:02 AM    Creatinine 1.05 12/09/2022 10:02 AM    BUN/Creatinine ratio 20 12/09/2022 10:02 AM    GFR est AA >60 06/06/2022 09:41 AM    GFR est non-AA 59 (L) 06/06/2022 09:41 AM    Calcium 9.6 12/09/2022 10:02 AM    Bilirubin, total 0.5 12/09/2022 10:02 AM    ALT (SGPT) 17 12/09/2022 10:02 AM    Alk. phosphatase 122 (H) 12/09/2022 10:02 AM    Protein, total 6.8 12/09/2022 10:02 AM    Albumin 3.8 12/09/2022 10:02 AM    Globulin 3.0 12/09/2022 10:02 AM    A-G Ratio 1.3 12/09/2022 10:02 AM      Lab Results   Component Value Date/Time    Hemoglobin A1c 5.4 12/09/2022 10:02 AM            Assessment / Plan     Hypertension well controlled, on Cozaar 25 mg and HCTZ 25 mg,   Hyperlipidemia well controlled on Zocor 20 mg daily       ICD-10-CM ICD-9-CM    1. Primary hypertension  J15 847.5 METABOLIC PANEL, COMPREHENSIVE      2. Hyperlipidemia LDL goal <100  E78.5 272.4 LIPID PANEL      3. Prediabetes  R73.03 790.29 Controlled with diet HEMOGLOBIN A1C W/O EAG      4. Centrilobular emphysema (HCC)  J43.2 492.8 Uncontrolled. Pt unable to afford Anoro Ellipta       5. Stage 3a chronic kidney disease (HCC)  N18.31 585. 3 Stable currently. Pt encouraged to work on weight loss. 6. Pain of right hand  M79.641 729.5 REFERRAL TO ORTHOPEDICS      7. Severe obesity (BMI 35.0-39. 9) with comorbidity ([facility] [address]  E66.01 278.01 Pt will continue to work on cutting back sugar and carbs in her diet and do even 5-10 minute walk if she can and increase over time.        8. Major depressive disorder in full remission, unspecified whether recurrent (HCC)  F32.5 296.26 Controlled on Lexapro 10 mg Low cholesterol diet, weight control and daily exercise discussed. Follow-up and Dispositions    Return in about 6 months (around 6/16/2023) for and Medicare Wellness Visit, labs 1 week before. Reviewed plan of care. Patient has provided input and agrees with goals.

## 2022-12-16 NOTE — PROGRESS NOTES
Patient is in the office today for a 6 month follow up. Do you have an Advance Directive no  Do you want more information : information given     1. \"Have you been to the ER, urgent care clinic since your last visit? Hospitalized since your last visit? \" No    2. \"Have you seen or consulted any other health care providers outside of the [address] [place] [address] since your last visit? \" No     3. For patients aged 39-70: Has the patient had a colonoscopy / FIT/ Cologuard? NA - based on age      If the patient is female:    4. For patients aged 41-77: Has the patient had a mammogram within the past 2 years? NA - based on age or sex      11. For patients aged 21-65: Has the patient had a pap smear?  NA - based on age or sex

## 2023-01-19 ENCOUNTER — OFFICE VISIT (OUTPATIENT)
Dept: ORTHOPEDIC SURGERY | Age: 78
End: 2023-01-19
Payer: COMMERCIAL

## 2023-01-19 VITALS — BODY MASS INDEX: 35 KG/M2 | HEIGHT: 64 IN | WEIGHT: 205 LBS

## 2023-01-19 DIAGNOSIS — E66.01 SEVERE OBESITY (BMI 35.0-39.9) WITH COMORBIDITY (HCC): ICD-10-CM

## 2023-01-19 DIAGNOSIS — M19.031 PRIMARY OSTEOARTHRITIS OF RIGHT WRIST: ICD-10-CM

## 2023-01-19 DIAGNOSIS — G56.01 RIGHT CARPAL TUNNEL SYNDROME: Primary | ICD-10-CM

## 2023-01-19 NOTE — LETTER
1/19/2023    Patient: Boni Baltazar   YOB: 1945   Date of Visit: 1/19/2023     Payton Bolden, [address]    Dear Payton Bolden MD,      Thank you for referring Ms. Boni Baltazar to [address] [place] [address] for evaluation. My notes for this consultation are attached. If you have questions, please do not hesitate to call me. I look forward to following your patient along with you.       Sincerely,    Mariah Gold, DO

## 2023-01-19 NOTE — PROGRESS NOTES
Tamiko Byrd is a 68 y.o. female right handed retiree. Worker's Compensation and legal considerations: none filed. Vitals:    01/19/23 1557   Weight: 205 lb (93 kg)   Height: 5' 4\" (1.626 m)   PainSc:   4   PainLoc: Hand           Chief Complaint   Patient presents with    Hand Pain     right         HPI: Presents today with complaints of right hand numbness and tingling as well as right wrist pain. She reports a history of bilateral carpal tunnel releases many years ago. Date of onset: Chronic    Injury: No    Prior Treatment:  No    Numbness/ Tingling: Yes: Comment: Right hand      ROS: Review of Systems - General ROS: negative  Psychological ROS: negative  ENT ROS: negative  Allergy and Immunology ROS: negative  Hematological and Lymphatic ROS: negative  Respiratory ROS: no cough, shortness of breath, or wheezing  Cardiovascular ROS: no chest pain or dyspnea on exertion  Gastrointestinal ROS: no abdominal pain, change in bowel habits, or black or bloody stools  Musculoskeletal ROS: negative  Neurological ROS: negative  Dermatological ROS: negative    Past Medical History:   Diagnosis Date    Asthma with COPD ([address]     Bilateral pulmonary embolism ([facility] [address] 09/2015    Unprovoked. Negative hypercoaguable workup. Completed 6 months of Xarelto. Cervical spondylosis     Colon adenoma     CTS (carpal tunnel syndrome)     Cystitis cystica 02/2016    Intermittent symptomatic UTIs. Evaluation by Dr. Caroline Waite. Negative abdom. CT scan and renal ultrasound. Cystoscopy showing diffuse cystitis cystica. Family history of colon cancer     GERD (gastroesophageal reflux disease)     Hypertension     Labyrinthitis     Left sided sciatica     Lumbar spondylosis     Menopause     Microhematuria     Multiple pulmonary nodules determined by computed tomography of lung 03/2016    Bilateral. 3-5 mm in size. Dr. García Johnson.      Osteopenia     Prediabetes     Status post gastric bypass for obesity     Thyroid nodule 11/2015 Thyroid ultrasound with dominant 2.7 cm solid nodule mid-lower left lobe. Dr. Katia Bartlett. FNA with benign cytology. Past Surgical History:   Procedure Laterality Date    HX APPENDECTOMY      HX CARPAL TUNNEL RELEASE      twice on left, once on right    HX CHOLECYSTECTOMY      HX GASTRIC BYPASS      HX GASTRIC BYPASS      HX HEMORRHOIDECTOMY      HX HERNIA REPAIR      HX HYSTERECTOMY      HX SHOULDER ARTHROSCOPY Left     MN APPENDECTOMY      MN LAP,CHOLECYSTECTOMY         Current Outpatient Medications   Medication Sig Dispense Refill    losartan (COZAAR) 25 mg tablet TAKE 1 TABLET BY MOUTH EVERY DAY 90 Tablet 3    simvastatin (ZOCOR) 20 mg tablet TAKE 1 TABLET BY MOUTH EVERY DAY IN THE EVENING 90 Tablet 1    hydroCHLOROthiazide (HYDRODIURIL) 25 mg tablet TAKE 1 TABLET BY MOUTH EVERY DAY 90 Tablet 1    escitalopram oxalate (LEXAPRO) 10 mg tablet TAKE 1 TABLET BY MOUTH EVERY DAY 90 Tablet 3    solifenacin (VESICARE) 10 mg tablet Take 1 Tablet by mouth daily. 90 Tablet 3    albuterol (PROVENTIL HFA, VENTOLIN HFA, PROAIR HFA) 90 mcg/actuation inhaler Take 1-2 Puffs by inhalation every four (4) hours as needed for Wheezing. 1 Inhaler 3    cranberry extract 450 mg tab Take  by mouth. inhalational spacing device 1 Each by Does Not Apply route as needed. 1 Device 0    cholecalciferol (VITAMIN D3) (1000 Units /25 mcg) tablet Take 1,000 Units by mouth two (2) times a day. CALCIUM CARBONATE/VITAMIN D3 (OS- + D PO) Take 1 Cap by mouth two (2) times a day. POLYETHYLENE GLYCOL 3350 (MIRALAX PO) Take  by mouth nightly. omeprazole (PRILOSEC) 20 mg capsule Take 20 mg by mouth daily. Allergies   Allergen Reactions    Macrobid [Nitrofurantoin Monohyd/M-Cryst] Anaphylaxis    Aspirin Unknown (comments)     Cannot take due to gastric bypass.     Lisinopril Cough    Parafon Forte Dsc [Chlorzoxazone] Other (comments)     Excessive drowsiness           PE:     Physical Exam  Vitals and nursing note reviewed. Constitutional:       General: She is not in acute distress. Appearance: Normal appearance. She is not ill-appearing. Cardiovascular:      Pulses: Normal pulses. Pulmonary:      Effort: Pulmonary effort is normal. No respiratory distress. Musculoskeletal:         General: Swelling and tenderness present. No deformity or signs of injury. Normal range of motion. Cervical back: Normal range of motion and neck supple. Right lower leg: No edema. Left lower leg: No edema. Skin:     General: Skin is warm and dry. Capillary Refill: Capillary refill takes less than 2 seconds. Findings: No bruising or erythema. Neurological:      General: No focal deficit present. Mental Status: She is alert and oriented to person, place, and time. Psychiatric:         Mood and Affect: Mood normal.         Behavior: Behavior normal.          Wrist: Tenderness localized to the right dorsal radiocarpal joint. Tenderness L R Test L R   1st Ext Comp - - Finkelstein's - -   Snuff Box - - Willis - -   2nd Ext Comp - - S-L Shear - -   S-L Joint - - L-T Shear - -   L-T Joint - - DRUJ Sup - -   6th Ext Comp - - DRUJ Pro - -   Ulnar Snuff - - DRUJ Grind - -   Fovea - - TFCC - -   STT Joint - - Mid-Carp Inst - -   FCR - - P-T Grind - -   Intersection - - ECU Sublux. - -      Dorsal Ganglion: -   Volar Ganglion: -      ROM: Full      NEUROVASCULAR    Examination L R Examination L R   Carpal Comp. - + Pronator Comp. - -   Carpal Tinel - + Pronator Tinel - -   Phalen's - - Pronator Stress - -   Cubital Comp. - - Guyon Comp. - -   Cubital Tinel - - Guyon Tinel - -   Elbow Hyperflexion - - Adson's - -   Spurling's - - SC Comp. - -   PCB Median abn - - SC Tinel - -   Radial Tinel - - IC Comp.  - -   Digital Tinel - - IC Tinel - -   Radial 2-Pt WNL WNL Ulnar 2-Pt WNL WNL     Radial Pulse: 2+  Capillary Refill: < 2 sec  Bi: Not Performed  Digital Bi: Not Performed        Imagin2023 3 views of right wrist is significant for mild degenerative changes of the radiocarpal joint. ICD-10-CM ICD-9-CM    1. Right carpal tunnel syndrome  G56.01 354.0 AMB SUPPLY ORDER      EMG ONE EXTREMITY UPPER RT      NCV/LAT MOTOR PER NERVE UP/RT      2. Primary osteoarthritis of right wrist  M19.031 715.13 AMB POC XRAY, WRIST; COMPLETE, 3+ VIE      3. Severe obesity (BMI 35.0-39. 9) with comorbidity ([address]  E66.01 278.01             Plan:     Right carpal tunnel brace and upper extremity EMG. Recommended Voltaren gel    Follow-up and Dispositions    Return for EMG review.           Plan was reviewed with patient, who verbalized agreement and understanding of the plan

## 2023-02-04 DIAGNOSIS — E78.5 HYPERLIPIDEMIA LDL GOAL <100: Primary | ICD-10-CM

## 2023-02-04 DIAGNOSIS — R73.03 PREDIABETES: Primary | ICD-10-CM

## 2023-02-05 DIAGNOSIS — I10 PRIMARY HYPERTENSION: Primary | ICD-10-CM

## 2023-02-13 RX ORDER — ESCITALOPRAM OXALATE 10 MG/1
TABLET ORAL
Qty: 90 TABLET | Refills: 3 | Status: SHIPPED | OUTPATIENT
Start: 2023-02-13

## 2023-02-13 RX ORDER — HYDROCHLOROTHIAZIDE 25 MG/1
TABLET ORAL
Qty: 90 TABLET | Refills: 1 | Status: SHIPPED | OUTPATIENT
Start: 2023-02-13

## 2023-02-27 ENCOUNTER — PROCEDURE VISIT (OUTPATIENT)
Age: 78
End: 2023-02-27
Payer: COMMERCIAL

## 2023-02-27 VITALS
TEMPERATURE: 97.5 F | HEIGHT: 64 IN | WEIGHT: 207 LBS | OXYGEN SATURATION: 96 % | BODY MASS INDEX: 35.34 KG/M2 | DIASTOLIC BLOOD PRESSURE: 54 MMHG | SYSTOLIC BLOOD PRESSURE: 137 MMHG | RESPIRATION RATE: 18 BRPM | HEART RATE: 67 BPM

## 2023-02-27 DIAGNOSIS — M79.601 PAIN OF RIGHT UPPER EXTREMITY: Primary | ICD-10-CM

## 2023-02-27 PROCEDURE — 95909 NRV CNDJ TST 5-6 STUDIES: CPT | Performed by: PHYSICAL MEDICINE & REHABILITATION

## 2023-02-27 PROCEDURE — 95886 MUSC TEST DONE W/N TEST COMP: CPT | Performed by: PHYSICAL MEDICINE & REHABILITATION

## 2023-02-27 RX ORDER — LOSARTAN POTASSIUM 25 MG/1
TABLET ORAL
COMMUNITY
Start: 2023-02-12

## 2023-02-27 RX ORDER — ALBUTEROL SULFATE 90 UG/1
1-2 AEROSOL, METERED RESPIRATORY (INHALATION) EVERY 4 HOURS PRN
COMMUNITY
Start: 2020-11-25

## 2023-02-27 RX ORDER — OMEPRAZOLE 20 MG/1
20 CAPSULE, DELAYED RELEASE ORAL DAILY
COMMUNITY

## 2023-02-27 RX ORDER — LOSARTAN POTASSIUM 25 MG/1
TABLET ORAL
COMMUNITY
Start: 2022-11-14

## 2023-02-27 RX ORDER — SIMVASTATIN 20 MG
TABLET ORAL
COMMUNITY
Start: 2023-02-12

## 2023-02-27 NOTE — PROGRESS NOTES
[facility] [address]  [address], [address]  [address]  Phone: [phone]  Fax: [phone]    Maritza Bryant  : 1945  PCP: Haley Curran MD  2023    ELECTROMYOGRAPHY AND NERVE CONDUCTION STUDIES    Duane Aden was referred by Dr. Yanet Gilmore for electrodiagnostic evaluation of intermittent paraesthesia of RUE. NCV & EMG Findings:  Evaluation of the right median sensory nerve showed prolonged distal peak latency (4.1 ms). All remaining nerves (as indicated in the following tables) were within normal limits. INTERPRETATION  This is an abnormal electrodiagnostic examination. 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 CLINICAL INTERPRETATION  Her electrodiagnostic finding of mild median mononeuropathy may be residual of her previous carpal tunnel release. It may contribute to her right hand symptoms. HISTORY OF PRESENT ILLNESS  Duane Aden is a 68 y.o. female. Pt presents today with RUE EMG evaluation for intermittent shooting pain of right hand. Pt notes she has had BUE carpal tunnel release performed in the past, several years ago. PAST MEDICAL HISTORY   Past Medical History:   Diagnosis Date    Asthma with COPD ([address]     Bilateral pulmonary embolism ([facility] [address] 2015    Unprovoked. Negative hypercoaguable workup. Completed 6 months of Xarelto. Cervical spondylosis     Colon adenoma     CTS (carpal tunnel syndrome)     Cystitis cystica 2016    Intermittent symptomatic UTIs. Evaluation by Dr. Janell Cardenas. Negative abdom. CT scan and renal ultrasound. Cystoscopy showing diffuse cystitis cystica.      Family history of colon cancer     GERD (gastroesophageal reflux disease)     Hypertension     Labyrinthitis Left sided sciatica     Lumbar spondylosis     Menopause     Microhematuria     Multiple pulmonary nodules determined by computed tomography of lung 03/2016    Bilateral. 3-5 mm in size. Dr. Ana Franco. Osteopenia     Prediabetes     Status post gastric bypass for obesity     Thyroid nodule 11/2015    Thyroid ultrasound with dominant 2.7 cm solid nodule mid-lower left lobe. Dr. Sandra Trammell. FNA with benign cytology. Past Surgical History:   Procedure Laterality Date    APPENDECTOMY      CARPAL TUNNEL RELEASE      twice on left, once on right    CHOLECYSTECTOMY      GASTRIC BYPASS SURGERY      GASTRIC BYPASS SURGERY      HEMORRHOID SURGERY      HERNIA REPAIR      HYSTERECTOMY (CERVIX STATUS UNKNOWN)      LAP,CHOLECYSTECTOMY      MO APPENDECTOMY      SHOULDER ARTHROSCOPY Left    . MEDICATIONS    Current Outpatient Medications   Medication Sig Dispense Refill    losartan (COZAAR) 25 MG tablet TAKE 1 TABLET BY MOUTH EVERY DAY      albuterol sulfate HFA (PROVENTIL;VENTOLIN;PROAIR) 108 (90 Base) MCG/ACT inhaler Inhale 1-2 puffs into the lungs every 4 hours as needed      losartan (COZAAR) 25 MG tablet TAKE 1 TABLET BY MOUTH EVERY DAY      vitamin D (CHOLECALCIFEROL) 25 MCG (1000 UT) TABS tablet Take 1,000 Units by mouth 2 times daily      omeprazole (PRILOSEC) 20 MG delayed release capsule Take 20 mg by mouth daily      simvastatin (ZOCOR) 20 MG tablet TAKE 1 TABLET BY MOUTH EVERY DAY IN THE EVENING      escitalopram (LEXAPRO) 10 MG tablet TAKE 1 TABLET BY MOUTH EVERY DAY 90 tablet 3    hydroCHLOROthiazide (HYDRODIURIL) 25 MG tablet TAKE 1 TABLET BY MOUTH EVERY DAY 90 tablet 1     No current facility-administered medications for this visit. ALLERGIES  Allergies   Allergen Reactions    Nitrofurantoin Anaphylaxis    Aspirin      Other reaction(s): Unknown (comments)  Cannot take due to gastric bypass.     Chlorzoxazone Other (See Comments)     Excessive drowsiness    Lisinopril Cough          SOCIAL HISTORY Social History     Socioeconomic History    Marital status:      Spouse name: None    Number of children: None    Years of education: None    Highest education level: None   Tobacco Use    Smoking status: Former     Packs/day: 3.00     Types: Cigarettes     Start date: 1963     Quit date: 1983     Years since quittin.1    Smokeless tobacco: Never   Substance and Sexual Activity    Alcohol use: No     Alcohol/week: 0.0 standard drinks    Drug use: No       FAMILY HISTORY  Family History   Problem Relation Age of Onset    Other Mother         Vascular disease    Cancer Mother     Lung Cancer Mother 80    Colon Cancer Father 28    Breast Cancer Maternal Grandmother     Other Sister         Gout    Hypertension Sister     Diabetes Brother     Breast Cancer Other         Grandmother    Cancer Father          PHYSICAL EXAMINATION  BP (!) 137/54   Pulse 67   Temp 97.5 °F (36.4 °C) (Temporal)   Resp 18   Ht 5' 4\" (1.626 m)   Wt 207 lb (93.9 kg)   SpO2 96%   BMI 35.53 kg/m²        AMB PAIN ASSESSMENT 2023   Location of Pain Wrist   Location Modifiers Right   Severity of Pain 5   Quality of Pain Other (Comment)   Duration of Pain Persistent   Frequency of Pain Constant   Aggravating Factors Bending;Stretching;Straightening;Exercise;Kneeling;Squatting;Standing;Walking;Stairs   Limiting Behavior Some   Relieving Factors Other (Comment)   Result of Injury No           Constitutional:  Well developed, well nourished, in no acute distress. Psychiatric: Affect and mood are appropriate. Integumentary: No rashes or abrasions noted on exposed areas. SPINE/MUSCULOSKELETAL EXAM    On brief examination: None.       NCV & EMG Findings:  NCS+  Motor Nerve Results      Latency Amplitude F-Lat Segment Distance CV Comment   Site (ms) Norm (mV) Norm (ms)  (cm) (m/s) Norm    Right Median (APB) Motor   Wrist 4.1  < 4.4 7.2  > 3.8  Wrist-APB 8      Elbow 8.0 - 5.4 -  Elbow-Wrist 22 56  > 51    Right Ulnar (ADM) Motor   Wrist 3.0  < 3.7 11.3  > 7.9  Wrist-ADM 8      Bel Elbow 6.4 - 10.1 -  Bel Elbow-Wrist 19 56  > 52    Abv Elbow 8.6 - 8.6 -  Abv Elbow-Bel Elbow 10 45  > 43      Sensory Sites       Latency (Onset) Latency (Peak)  Amplitude (O-P) Segment Distance CV (Onset) Comment   Site ms Norm (ms) Norm µV Norm  mm m/s Norm    Right Median Sensory   Mid Palm-Dig II 1.23 - 2.1 - 18 - Mid Palm-Dig II - - -    Wrist-Dig II 3.2  < 3.3 4.1  < 4.0 13  > 7 Wrist-Mid Palm - 44 -    Right Radial Sensory   Forearm-Wrist 1.53  < 2.2 2.2  < 2.8 27  > 7 Forearm-Wrist 10 65 -    Right Ulnar Sensory   Wrist-Dig V 2.8  < 3.1 3.7  < 4.0 9  > 7 Wrist-Dig V 14 50 -      EMG+     Side Muscle Nerve Root Ins Act Fibs Psw Fascics Other Amp Dur Poly Recrt Activation Comment Misc   Right Biceps Musculocut C5-C6 Nml Nml Nml Nml 0 Nml Nml 0 Nml Nml     Right Triceps Radial C6-C8 Nml Nml Nml Nml 0 Nml Nml 0 Nml Nml     Right Pronator Teres Median C6-C7 Nml Nml Nml Nml 0 Nml Nml 0 Nml Nml     Right FDI Median,  Ulnar C8-T1 Nml Nml Nml Nml 0 Nml Nml 0 Nml Nml     Right APB Median C8-T1 Nml Nml Nml Nml 0 Nml Nml 0 Nml Nml             Waveforms:    Motor         Sensory                 [facility]  OFFICE PROCEDURE PROGRESS NOTE        Chart reviewed for the following:   Eh VICTORIA, have reviewed the History, Physical and updated the Allergic reactions for [address] [employer] performed immediately prior to start of procedure:   Eh VICTORIA, have performed the following reviews on [place] prior to the start of the procedure:            * Patient was identified by name and date of birth   * Agreement on procedure being performed was verified  * Risks and Benefits explained to the patient  * Procedure site verified and marked as necessary  * Patient was positioned for comfort  * Consent was signed and verified     Time: 2:25 PM     Date of procedure: 2/27/2023    Procedure performed by:  Mary Fleming MD    Provider accompanied by: Yann.     Patient accompanied by another individual: No    How tolerated by patient: tolerated the procedure well with no complications    Post Procedural Pain Scale: 0 - No Hurt    Comments: none    Written by Riley Olmos as dictated by Kiara Angel MD

## 2023-03-10 ENCOUNTER — OFFICE VISIT (OUTPATIENT)
Age: 78
End: 2023-03-10

## 2023-03-10 VITALS
BODY MASS INDEX: 35.34 KG/M2 | TEMPERATURE: 96.8 F | WEIGHT: 207 LBS | HEART RATE: 80 BPM | OXYGEN SATURATION: 95 % | HEIGHT: 64 IN

## 2023-03-10 DIAGNOSIS — M19.031 PRIMARY OSTEOARTHRITIS OF RIGHT WRIST: ICD-10-CM

## 2023-03-10 DIAGNOSIS — G56.01 RIGHT CARPAL TUNNEL SYNDROME: Primary | ICD-10-CM

## 2023-03-10 NOTE — PROGRESS NOTES
Kike Werner is a 68 y.o. female right handed retiree. Worker's Compensation and legal considerations: none    Chief Complaint   Patient presents with    Hand Pain     Diagnostic follow up        Pain Score:   0 - No pain    HPI: Patient presents today for follow-up of right upper extremity EMG. She has been trying diclofenac for her right wrist arthritis with minimal improvement. 1/19/2023 HPI: Presents today with complaints of right hand numbness and tingling as well as right wrist pain. She reports a history of bilateral carpal tunnel releases many years ago. Date of onset: Chronic  Injury: No  Prior Treatment:  Yes: Comment: Voltaren gel    ROS: Review of Systems - General ROS: negative except HPI    Past Medical History:   Diagnosis Date    Asthma with COPD ([address]     Bilateral pulmonary embolism ([address] 09/2015    Unprovoked. Negative hypercoaguable workup. Completed 6 months of Xarelto. Cervical spondylosis     Colon adenoma     CTS (carpal tunnel syndrome)     Cystitis cystica 02/2016    Intermittent symptomatic UTIs. Evaluation by Dr. Paiz Lipoma. Negative abdom. CT scan and renal ultrasound. Cystoscopy showing diffuse cystitis cystica. Family history of colon cancer     GERD (gastroesophageal reflux disease)     Hypertension     Labyrinthitis     Left sided sciatica     Lumbar spondylosis     Menopause     Microhematuria     Multiple pulmonary nodules determined by computed tomography of lung 03/2016    Bilateral. 3-5 mm in size. Dr. Heron Ronquillo. Osteopenia     Prediabetes     Status post gastric bypass for obesity     Thyroid nodule 11/2015    Thyroid ultrasound with dominant 2.7 cm solid nodule mid-lower left lobe. Dr. Candance Patrick. FNA with benign cytology.         Past Surgical History:   Procedure Laterality Date    APPENDECTOMY      CARPAL TUNNEL RELEASE      twice on left, once on right    CHOLECYSTECTOMY      GASTRIC BYPASS SURGERY      GASTRIC BYPASS SURGERY      HEMORRHOID SURGERY      HERNIA REPAIR      HYSTERECTOMY (CERVIX STATUS UNKNOWN)      LAP,CHOLECYSTECTOMY      MD APPENDECTOMY      SHOULDER ARTHROSCOPY Left         Current Outpatient Medications   Medication Sig Dispense Refill    losartan (COZAAR) 25 MG tablet TAKE 1 TABLET BY MOUTH EVERY DAY      losartan (COZAAR) 25 MG tablet TAKE 1 TABLET BY MOUTH EVERY DAY      vitamin D (CHOLECALCIFEROL) 25 MCG (1000 UT) TABS tablet Take 1,000 Units by mouth 2 times daily      albuterol sulfate HFA (PROVENTIL;VENTOLIN;PROAIR) 108 (90 Base) MCG/ACT inhaler Inhale 1-2 puffs into the lungs every 4 hours as needed      omeprazole (PRILOSEC) 20 MG delayed release capsule Take 20 mg by mouth daily      simvastatin (ZOCOR) 20 MG tablet TAKE 1 TABLET BY MOUTH EVERY DAY IN THE EVENING      escitalopram (LEXAPRO) 10 MG tablet TAKE 1 TABLET BY MOUTH EVERY DAY 90 tablet 3    hydroCHLOROthiazide (HYDRODIURIL) 25 MG tablet TAKE 1 TABLET BY MOUTH EVERY DAY 90 tablet 1     Current Facility-Administered Medications   Medication Dose Route Frequency Provider Last Rate Last Admin    triamcinolone acetonide (KENALOG) injection 5 mg  5 mg Intra-artICUlar Once Derek WANDER Serrano, DO        triamcinolone acetonide (KENALOG) injection 5 mg  5 mg Intra-LESional Once Derek A Zach, DO           Allergies   Allergen Reactions    Nitrofurantoin Anaphylaxis    Aspirin      Other reaction(s): Unknown (comments)  Cannot take due to gastric bypass. Chlorzoxazone Other (See Comments)     Excessive drowsiness    Lisinopril Cough         Pulse 80   Temp 96.8 °F (36 °C) (Temporal)   Ht 5' 4\" (1.626 m)   Wt 207 lb (93.9 kg)   SpO2 95%   BMI 35.53 kg/m²   Physical Exam  Vitals and nursing note reviewed. Constitutional:       General: She is not in acute distress. Appearance: Normal appearance. She is not ill-appearing. Cardiovascular:      Pulses: Normal pulses. Pulmonary:      Effort: Pulmonary effort is normal. No respiratory distress.    Musculoskeletal:         General: Tenderness present. No swelling, deformity or signs of injury. Normal range of motion. Cervical back: Normal range of motion and neck supple. Right lower leg: No edema. Left lower leg: No edema. Skin:     General: Skin is warm and dry. Capillary Refill: Capillary refill takes less than 2 seconds. Findings: No bruising or erythema. Neurological:      General: No focal deficit present. Mental Status: She is alert and oriented to person, place, and time. Psychiatric:         Mood and Affect: Mood normal.         Behavior: Behavior normal.        Wrist: Tenderness localized to the right dorsal radiocarpal joint. Tenderness L R Test L R   1st Ext Comp - - Finkelstein's - -   Snuff Box - - Aquino - -   2nd Ext Comp - - S-L Shear - -   S-L Joint - - L-T Shear - -   L-T Joint - - DRUJ Sup - -   6th Ext Comp - - DRUJ Pro - -   Ulnar Snuff - - DRUJ Grind - -   Fovea - - TFCC - -   STT Joint - - Mid-Carp Inst - -   FCR - - P-T Grind - -   Intersection - - ECU Sublux. - -                  Dorsal Ganglion: -              Volar Ganglion: -                            ROM: Full      NEUROVASCULAR     Examination L R Examination L R   Carpal Comp. - + Pronator Comp. - -   Carpal Tinel - + Pronator Tinel - -   Phalen's - - Pronator Stress - -   Cubital Comp. - - Guyon Comp. - -   Cubital Tinel - - Guyon Tinel - -   Elbow Hyperflexion - - Adson's - -   Spurling's - - SC Comp. - -   PCB Median abn - - SC Tinel - -   Radial Tinel - - IC Comp. - -   Digital Tinel - - IC Tinel - -   Radial 2-Pt WNL WNL Ulnar 2-Pt WNL WNL      Radial Pulse: 2+  Capillary Refill: < 2 sec  Christopher: Not Performed  [employer]: Not Performed      NCV & EMG Findings:  Evaluation of the right median sensory nerve showed prolonged distal peak latency (4.1 ms). All remaining nerves (as indicated in the following tables) were within normal limits. INTERPRETATION  This is an abnormal electrodiagnostic examination.  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 CLINICAL INTERPRETATION  Her electrodiagnostic finding of mild median mononeuropathy may be residual of her previous carpal tunnel release. It may contribute to her right hand symptoms. [id number] 3 views of right wrist is significant for mild degenerative changes of the radiocarpal joint. Impression     Diagnosis Orders   1. Right carpal tunnel syndrome  INJECT CARPAL TUNNEL    triamcinolone acetonide (KENALOG) injection 5 mg      2. Primary osteoarthritis of right wrist  triamcinolone acetonide (KENALOG) injection 5 mg    DRAIN/INJECT INTERMEDIATE JOINT/BURSA            Plan:     Right carpal tunnel injection and right wrist joint injection    Operative nonoperative treatment for the right carpal tunnel patient would like to try injections first.    Return if symptoms worsen or fail to improve.      Plan was reviewed with patient, who verbalized agreement and understanding of the plan    [address] [facility]  [address] [facility], [facility]   OFFICE PROCEDURE PROGRESS NOTE        Chart reviewed for the following:   I, Derek Serrano DO, have reviewed the History, Physical and updated the Allergic reactions for [facility] performed immediately prior to start of procedure:   I, [facility] DO [place], have performed the following reviews on Waldemara Sever prior to the start of the procedure:            * Patient was identified by name and date of birth   * Agreement on procedure being performed was verified  * Risks and Benefits explained to the patient  * Procedure site verified and marked as necessary  * Patient was positioned for comfort  * Consent was signed and verified     Time: 09:55 AM      Date of procedure: 3/10/2023    Procedure performed by: Bran Guzmán DO    Provider assisted by: Keri Graham MA    Patient assisted by: self    How tolerated by patient: tolerated    Post Procedural Pain Scale:0    Comments: none    Procedure:  After consent was obtained, using sterile technique the right carpal tunnel and right wrist was prepped. Local anesthetic used: 1% Lidocaine Kenalog 5 mg x2 and was then injected and the needle withdrawn. The procedure was well tolerated. The patient is asked to continue to rest the area for a few more days before resuming regular activities. It may be more painful for the first 1-2 days. Watch for fever, or increased swelling or persistent pain in the joint. Call or return to clinic prn if such symptoms occur or there is failure to improve as anticipated. Note: This note was completed using voice recognition software.   Any typographical/name errors or mistakes are unintentional.

## 2023-05-11 RX ORDER — SIMVASTATIN 20 MG
TABLET ORAL
Qty: 90 TABLET | Refills: 1 | Status: SHIPPED | OUTPATIENT
Start: 2023-05-11

## 2023-06-09 ENCOUNTER — HOSPITAL ENCOUNTER (OUTPATIENT)
Facility: HOSPITAL | Age: 78
Setting detail: SPECIMEN
End: 2023-06-09
Payer: COMMERCIAL

## 2023-06-09 DIAGNOSIS — I10 PRIMARY HYPERTENSION: ICD-10-CM

## 2023-06-09 DIAGNOSIS — R73.03 PREDIABETES: ICD-10-CM

## 2023-06-09 DIAGNOSIS — E78.5 HYPERLIPIDEMIA LDL GOAL <100: ICD-10-CM

## 2023-06-09 LAB
ALBUMIN SERPL-MCNC: 3.7 G/DL (ref 3.4–5)
ALBUMIN/GLOB SERPL: 1.3 (ref 0.8–1.7)
ALP SERPL-CCNC: 104 U/L (ref 45–117)
ALT SERPL-CCNC: 21 U/L (ref 13–56)
ANION GAP SERPL CALC-SCNC: 5 MMOL/L (ref 3–18)
AST SERPL-CCNC: 13 U/L (ref 10–38)
BILIRUB SERPL-MCNC: 0.5 MG/DL (ref 0.2–1)
BUN SERPL-MCNC: 19 MG/DL (ref 7–18)
BUN/CREAT SERPL: 21 (ref 12–20)
CALCIUM SERPL-MCNC: 9.8 MG/DL (ref 8.5–10.1)
CHLORIDE SERPL-SCNC: 104 MMOL/L (ref 100–111)
CHOLEST SERPL-MCNC: 153 MG/DL
CO2 SERPL-SCNC: 30 MMOL/L (ref 21–32)
CREAT SERPL-MCNC: 0.91 MG/DL (ref 0.6–1.3)
EST. AVERAGE GLUCOSE BLD GHB EST-MCNC: 114 MG/DL
GLOBULIN SER CALC-MCNC: 2.9 G/DL (ref 2–4)
GLUCOSE SERPL-MCNC: 99 MG/DL (ref 74–99)
HBA1C MFR BLD: 5.6 % (ref 4.2–5.6)
HDLC SERPL-MCNC: 51 MG/DL (ref 40–60)
HDLC SERPL: 3 (ref 0–5)
LDLC SERPL CALC-MCNC: 59.2 MG/DL (ref 0–100)
LIPID PANEL: ABNORMAL
POTASSIUM SERPL-SCNC: 3.9 MMOL/L (ref 3.5–5.5)
PROT SERPL-MCNC: 6.6 G/DL (ref 6.4–8.2)
SODIUM SERPL-SCNC: 139 MMOL/L (ref 136–145)
TRIGL SERPL-MCNC: 214 MG/DL
VLDLC SERPL CALC-MCNC: 42.8 MG/DL

## 2023-06-09 PROCEDURE — 36415 COLL VENOUS BLD VENIPUNCTURE: CPT

## 2023-06-09 PROCEDURE — 80061 LIPID PANEL: CPT

## 2023-06-09 PROCEDURE — 80053 COMPREHEN METABOLIC PANEL: CPT

## 2023-06-09 PROCEDURE — 83036 HEMOGLOBIN GLYCOSYLATED A1C: CPT

## 2023-06-16 ENCOUNTER — OFFICE VISIT (OUTPATIENT)
Age: 78
End: 2023-06-16
Payer: COMMERCIAL

## 2023-06-16 VITALS
RESPIRATION RATE: 18 BRPM | SYSTOLIC BLOOD PRESSURE: 138 MMHG | WEIGHT: 203 LBS | HEIGHT: 64 IN | HEART RATE: 73 BPM | BODY MASS INDEX: 34.66 KG/M2 | DIASTOLIC BLOOD PRESSURE: 49 MMHG | OXYGEN SATURATION: 98 % | TEMPERATURE: 98.3 F

## 2023-06-16 DIAGNOSIS — E78.2 MIXED HYPERLIPIDEMIA: ICD-10-CM

## 2023-06-16 DIAGNOSIS — H81.13 BENIGN PAROXYSMAL POSITIONAL VERTIGO DUE TO BILATERAL VESTIBULAR DISORDER: ICD-10-CM

## 2023-06-16 DIAGNOSIS — R26.81 UNSTABLE GAIT: ICD-10-CM

## 2023-06-16 DIAGNOSIS — R73.03 PREDIABETES: ICD-10-CM

## 2023-06-16 DIAGNOSIS — I10 ESSENTIAL (PRIMARY) HYPERTENSION: Primary | ICD-10-CM

## 2023-06-16 PROCEDURE — 3074F SYST BP LT 130 MM HG: CPT | Performed by: INTERNAL MEDICINE

## 2023-06-16 PROCEDURE — 99214 OFFICE O/P EST MOD 30 MIN: CPT | Performed by: INTERNAL MEDICINE

## 2023-06-16 PROCEDURE — 3078F DIAST BP <80 MM HG: CPT | Performed by: INTERNAL MEDICINE

## 2023-06-16 PROCEDURE — 1123F ACP DISCUSS/DSCN MKR DOCD: CPT | Performed by: INTERNAL MEDICINE

## 2023-06-16 SDOH — ECONOMIC STABILITY: HOUSING INSECURITY
IN THE LAST 12 MONTHS, WAS THERE A TIME WHEN YOU DID NOT HAVE A STEADY PLACE TO SLEEP OR SLEPT IN A SHELTER (INCLUDING NOW)?: NO

## 2023-06-16 SDOH — ECONOMIC STABILITY: FOOD INSECURITY: WITHIN THE PAST 12 MONTHS, THE FOOD YOU BOUGHT JUST DIDN'T LAST AND YOU DIDN'T HAVE MONEY TO GET MORE.: NEVER TRUE

## 2023-06-16 SDOH — ECONOMIC STABILITY: FOOD INSECURITY: WITHIN THE PAST 12 MONTHS, YOU WORRIED THAT YOUR FOOD WOULD RUN OUT BEFORE YOU GOT MONEY TO BUY MORE.: NEVER TRUE

## 2023-06-16 SDOH — ECONOMIC STABILITY: INCOME INSECURITY: HOW HARD IS IT FOR YOU TO PAY FOR THE VERY BASICS LIKE FOOD, HOUSING, MEDICAL CARE, AND HEATING?: NOT HARD AT ALL

## 2023-06-16 ASSESSMENT — PATIENT HEALTH QUESTIONNAIRE - PHQ9
SUM OF ALL RESPONSES TO PHQ QUESTIONS 1-9: 0
6. FEELING BAD ABOUT YOURSELF - OR THAT YOU ARE A FAILURE OR HAVE LET YOURSELF OR YOUR FAMILY DOWN: 0
SUM OF ALL RESPONSES TO PHQ QUESTIONS 1-9: 0
7. TROUBLE CONCENTRATING ON THINGS, SUCH AS READING THE NEWSPAPER OR WATCHING TELEVISION: 0
SUM OF ALL RESPONSES TO PHQ QUESTIONS 1-9: 0
SUM OF ALL RESPONSES TO PHQ QUESTIONS 1-9: 0
5. POOR APPETITE OR OVEREATING: 0
1. LITTLE INTEREST OR PLEASURE IN DOING THINGS: 0
2. FEELING DOWN, DEPRESSED OR HOPELESS: 0
9. THOUGHTS THAT YOU WOULD BE BETTER OFF DEAD, OR OF HURTING YOURSELF: 0
SUM OF ALL RESPONSES TO PHQ9 QUESTIONS 1 & 2: 0
3. TROUBLE FALLING OR STAYING ASLEEP: 0
4. FEELING TIRED OR HAVING LITTLE ENERGY: 0
10. IF YOU CHECKED OFF ANY PROBLEMS, HOW DIFFICULT HAVE THESE PROBLEMS MADE IT FOR YOU TO DO YOUR WORK, TAKE CARE OF THINGS AT HOME, OR GET ALONG WITH OTHER PEOPLE: 0
8. MOVING OR SPEAKING SO SLOWLY THAT OTHER PEOPLE COULD HAVE NOTICED. OR THE OPPOSITE, BEING SO FIGETY OR RESTLESS THAT YOU HAVE BEEN MOVING AROUND A LOT MORE THAN USUAL: 0

## 2023-08-08 ENCOUNTER — ANESTHESIA EVENT (OUTPATIENT)
Age: 78
End: 2023-08-08
Payer: COMMERCIAL

## 2023-08-08 RX ORDER — SODIUM CHLORIDE 9 MG/ML
INJECTION, SOLUTION INTRAVENOUS PRN
Status: CANCELLED | OUTPATIENT
Start: 2023-08-08

## 2023-08-08 RX ORDER — SODIUM CHLORIDE 0.9 % (FLUSH) 0.9 %
5-40 SYRINGE (ML) INJECTION EVERY 12 HOURS SCHEDULED
Status: CANCELLED | OUTPATIENT
Start: 2023-08-08

## 2023-08-08 RX ORDER — SODIUM CHLORIDE 0.9 % (FLUSH) 0.9 %
5-40 SYRINGE (ML) INJECTION PRN
Status: CANCELLED | OUTPATIENT
Start: 2023-08-08

## 2023-08-08 RX ORDER — SODIUM CHLORIDE 9 MG/ML
INJECTION, SOLUTION INTRAVENOUS CONTINUOUS
Status: CANCELLED | OUTPATIENT
Start: 2023-08-08

## 2023-08-23 ENCOUNTER — HOSPITAL ENCOUNTER (OUTPATIENT)
Age: 78
Setting detail: OUTPATIENT SURGERY
Discharge: HOME | End: 2023-08-23
Attending: OPHTHALMOLOGY | Admitting: OPHTHALMOLOGY
Payer: COMMERCIAL

## 2023-08-23 ENCOUNTER — ANESTHESIA (OUTPATIENT)
Age: 78
End: 2023-08-23
Payer: COMMERCIAL

## 2023-08-23 VITALS
DIASTOLIC BLOOD PRESSURE: 73 MMHG | BODY MASS INDEX: 33.29 KG/M2 | SYSTOLIC BLOOD PRESSURE: 137 MMHG | OXYGEN SATURATION: 98 % | HEIGHT: 64 IN | HEART RATE: 61 BPM | TEMPERATURE: 98.4 F | WEIGHT: 195 LBS | RESPIRATION RATE: 17 BRPM

## 2023-08-23 PROCEDURE — 2500000003 HC RX 250 WO HCPCS: Performed by: OPHTHALMOLOGY

## 2023-08-23 PROCEDURE — V2632 POST CHMBR INTRAOCULAR LENS: HCPCS | Performed by: OPHTHALMOLOGY

## 2023-08-23 PROCEDURE — 6360000002 HC RX W HCPCS: Performed by: NURSE ANESTHETIST, CERTIFIED REGISTERED

## 2023-08-23 PROCEDURE — 3600000012 HC SURGERY LEVEL 2 ADDTL 15MIN: Performed by: OPHTHALMOLOGY

## 2023-08-23 PROCEDURE — 2580000003 HC RX 258: Performed by: NURSE ANESTHETIST, CERTIFIED REGISTERED

## 2023-08-23 PROCEDURE — 3700000000 HC ANESTHESIA ATTENDED CARE: Performed by: OPHTHALMOLOGY

## 2023-08-23 PROCEDURE — 7100000010 HC PHASE II RECOVERY - FIRST 15 MIN: Performed by: OPHTHALMOLOGY

## 2023-08-23 PROCEDURE — 3700000001 HC ADD 15 MINUTES (ANESTHESIA): Performed by: OPHTHALMOLOGY

## 2023-08-23 PROCEDURE — 6370000000 HC RX 637 (ALT 250 FOR IP): Performed by: OPHTHALMOLOGY

## 2023-08-23 PROCEDURE — 6360000002 HC RX W HCPCS: Performed by: OPHTHALMOLOGY

## 2023-08-23 PROCEDURE — 2709999900 HC NON-CHARGEABLE SUPPLY: Performed by: OPHTHALMOLOGY

## 2023-08-23 PROCEDURE — 3600000002 HC SURGERY LEVEL 2 BASE: Performed by: OPHTHALMOLOGY

## 2023-08-23 DEVICE — LENS IOL TECNIS EYHANCE DIB00U0215: Type: IMPLANTABLE DEVICE | Site: EYE | Status: FUNCTIONAL

## 2023-08-23 RX ORDER — SODIUM CHLORIDE 0.9 % (FLUSH) 0.9 %
5-40 SYRINGE (ML) INJECTION PRN
Status: DISCONTINUED | OUTPATIENT
Start: 2023-08-23 | End: 2023-08-23 | Stop reason: HOSPADM

## 2023-08-23 RX ORDER — SODIUM CHLORIDE 0.9 % (FLUSH) 0.9 %
5-40 SYRINGE (ML) INJECTION EVERY 12 HOURS SCHEDULED
Status: CANCELLED | OUTPATIENT
Start: 2023-08-23

## 2023-08-23 RX ORDER — EPINEPHRINE 1 MG/ML(1)
AMPUL (ML) INJECTION PRN
Status: DISCONTINUED | OUTPATIENT
Start: 2023-08-23 | End: 2023-08-23 | Stop reason: ALTCHOICE

## 2023-08-23 RX ORDER — SODIUM CHLORIDE 9 MG/ML
INJECTION, SOLUTION INTRAVENOUS PRN
Status: CANCELLED | OUTPATIENT
Start: 2023-08-23

## 2023-08-23 RX ORDER — MOXIFLOXACIN 5 MG/ML
SOLUTION/ DROPS OPHTHALMIC PRN
Status: DISCONTINUED | OUTPATIENT
Start: 2023-08-23 | End: 2023-08-23 | Stop reason: ALTCHOICE

## 2023-08-23 RX ORDER — TETRACAINE HYDROCHLORIDE 5 MG/ML
SOLUTION OPHTHALMIC PRN
Status: DISCONTINUED | OUTPATIENT
Start: 2023-08-23 | End: 2023-08-23 | Stop reason: ALTCHOICE

## 2023-08-23 RX ORDER — MIDAZOLAM HYDROCHLORIDE 1 MG/ML
INJECTION INTRAMUSCULAR; INTRAVENOUS PRN
Status: DISCONTINUED | OUTPATIENT
Start: 2023-08-23 | End: 2023-08-23 | Stop reason: SDUPTHER

## 2023-08-23 RX ORDER — 0.9 % SODIUM CHLORIDE 0.9 %
INTRAVENOUS SOLUTION INTRAVENOUS PRN
Status: DISCONTINUED | OUTPATIENT
Start: 2023-08-23 | End: 2023-08-23 | Stop reason: SDUPTHER

## 2023-08-23 RX ADMIN — Medication: at 08:56

## 2023-08-23 RX ADMIN — SODIUM CHLORIDE 5 ML: 9 INJECTION, SOLUTION INTRAVENOUS at 10:09

## 2023-08-23 RX ADMIN — MIDAZOLAM HYDROCHLORIDE 2 MG: 2 INJECTION, SOLUTION INTRAMUSCULAR; INTRAVENOUS at 10:09

## 2023-08-23 ASSESSMENT — COPD QUESTIONNAIRES: CAT_SEVERITY: NO INTERVAL CHANGE

## 2023-08-23 ASSESSMENT — PAIN - FUNCTIONAL ASSESSMENT: PAIN_FUNCTIONAL_ASSESSMENT: 0-10

## 2023-08-23 NOTE — ANESTHESIA PRE PROCEDURE
08:58 AM    BUN 19 06/09/2023 08:58 AM    CREATININE 0.91 06/09/2023 08:58 AM    GFRAA >60 06/06/2022 09:41 AM    AGRATIO 1.3 12/09/2022 10:02 AM    LABGLOM >60 06/09/2023 08:58 AM    GLUCOSE 99 06/09/2023 08:58 AM    PROT 6.6 06/09/2023 08:58 AM    CALCIUM 9.8 06/09/2023 08:58 AM    BILITOT 0.5 06/09/2023 08:58 AM    ALKPHOS 104 06/09/2023 08:58 AM    ALKPHOS 122 12/09/2022 10:02 AM    AST 13 06/09/2023 08:58 AM    ALT 21 06/09/2023 08:58 AM       POC Tests: No results for input(s): POCGLU, POCNA, POCK, POCCL, POCBUN, POCHEMO, POCHCT in the last 72 hours. Coags: No results found for: PROTIME, INR, APTT    HCG (If Applicable): No results found for: PREGTESTUR, PREGSERUM, HCG, HCGQUANT     ABGs: No results found for: PHART, PO2ART, IHP5QZY, TKO0GSF, BEART, P0DWLFSX     Type & Screen (If Applicable):  No results found for: LABABO, LABRH    Drug/Infectious Status (If Applicable):  Lab Results   Component Value Date/Time    HEPCAB 0.34 11/30/2016 10:21 AM    HEPCAB NEGATIVE 11/30/2016 10:21 AM       COVID-19 Screening (If Applicable): No results found for: COVID19        Anesthesia Evaluation  Patient summary reviewed and Nursing notes reviewed  Airway: Mallampati: II          Dental: normal exam         Pulmonary:Negative Pulmonary ROS and normal exam    (+) COPD: no interval change,  asthma: seasonal asthma,                            Cardiovascular:Negative CV ROS  Exercise tolerance: good (>4 METS),   (+) hypertension: no interval change,                   Neuro/Psych:   Negative Neuro/Psych ROS  (+) psychiatric history: stable with treatment            GI/Hepatic/Renal: Neg GI/Hepatic/Renal ROS  (+) GERD: no interval change,           Endo/Other: Negative Endo/Other ROS                    Abdominal:             Vascular: negative vascular ROS. Other Findings:           Anesthesia Plan      MAC and TIVA     ASA 3       Induction: intravenous. Anesthetic plan and risks discussed with patient.     Use

## 2023-08-23 NOTE — H&P
Day of Surgery H&P:  Mahogany Pierce was seen and examined. There have been no significant clinical changes since the completion of the exam in the office. Pt continues to complain of persistent poor vision and/or glare in planned operative eye affecting ability to perform basic ADLs (such as reading or driving at night). Past Medical History:   Diagnosis Date    Asthma with COPD ([address])     Bilateral pulmonary embolism ([address]) 09/2015    Unprovoked. Negative hypercoaguable workup. Completed 6 months of Xarelto. Cervical spondylosis     Colon adenoma     CTS (carpal tunnel syndrome)     Cystitis cystica 02/2016    Intermittent symptomatic UTIs. Evaluation by Dr. José Hays. Negative abdom. CT scan and renal ultrasound. Cystoscopy showing diffuse cystitis cystica. Family history of colon cancer     GERD (gastroesophageal reflux disease)     Hypertension     Labyrinthitis     Left sided sciatica     Lumbar spondylosis     Menopause     Microhematuria     Multiple pulmonary nodules determined by computed tomography of lung 03/2016    Bilateral. 3-5 mm in size. Dr. Christiane Wells. Osteopenia     Prediabetes     Status post gastric bypass for obesity     Thyroid nodule 11/2015    Thyroid ultrasound with dominant 2.7 cm solid nodule mid-lower left lobe. Dr. Michel Trotter. FNA with benign cytology. Family Medical History: Non-contributory    HOME MED LIST:  Prior to Admission medications    Medication Sig Start Date End Date Taking?  Authorizing Provider   simvastatin (ZOCOR) 20 MG tablet TAKE 1 TABLET BY MOUTH EVERY DAY IN THE EVENING 5/11/23   Tiffany Solis MD   losartan (COZAAR) 25 MG tablet TAKE 1 TABLET BY MOUTH EVERY DAY 2/12/23   Historical Provider, MD   vitamin D (CHOLECALCIFEROL) 25 MCG (1000 UT) TABS tablet Take 1 tablet by mouth 2 times daily    Historical Provider, MD   albuterol sulfate HFA (PROVENTIL;VENTOLIN;PROAIR) 108 (90 Base) MCG/ACT inhaler Inhale 1-2 puffs into the lungs every 4 hours as

## 2023-08-23 NOTE — DISCHARGE INSTRUCTIONS
POST-OPERATIVE INSTRUCTIONS FOR CATARACT SURGERY     1. No heavy lifting (no more than 5 pounds). No Bending at waist and No strenuous activity for one (1) week. 2. DO NOT RUB YOUR EYE!!! Wear eye protection at all times when going outdoors (glasses, sunglasses,        ect) and wear shield while sleeping/napping for the first week after surgery. 3. DO NOT GET WATER IN YOUR EYES FOR THE NEXT 7 DAYS. You may gently clean your face and you        may shower, but don't put any pressure on the eye. Ladies, no eye makeup for one (1) week after surgery. Do not swim or get into a hot tub or pool for three (3) weeks. 4. You may experience eye irritation, aching, itching and blurred vision for several days. You may use over the         Counter PRESERVATIVE FREE Refresh or Systane as needed. Use Tylenol or Ibuprofen (Motrin) for         Discomfort but DO NOT RUB YOUR EYE . 5. Call your doctor with any increased pain, redness, nausea, vomiting, or worsening vision. Also call your doctor        with new flashes of light, many new floaters or a curtain/veil coming into your vision. [address] YOUR HANDS BEFORE PUTTING IN YOUR DROPS. ALLOW 5 MINUTES BETWEEN DIFFERENT DROPS.                           IF YOU HAVE ANY QUESTION OR CONCERNS:     DURING OFFICE HOURS CALL [phone]   OR AFTER HOURS / WEEKENDS CALL OR TEXT [phone]

## 2023-08-23 NOTE — ANESTHESIA POSTPROCEDURE EVALUATION
Department of Anesthesiology  Postprocedure Note    Patient: Tyrone Branham  MRN: [id number]  YOB: 1945  Date of evaluation: 8/23/2023      Procedure Summary     Date: 08/23/23 Room / Location: [facility] MAIN 01 / [facility] MAIN OR    Anesthesia Start: 1005 Anesthesia Stop: 1022    Procedure: PAHCO IOL OD (Right: Eye) Diagnosis:       Nuclear sclerotic cataract of right eye      (Nuclear sclerotic cataract of right eye [H25.11])    Surgeons: Neno Gomez MD Responsible Provider: CRISTINA Hernandez CRNA    Anesthesia Type: MAC ASA Status: 3          Anesthesia Type: MAC    Senait Phase I: Senait Score: 10    Senait Phase II:        Anesthesia Post Evaluation    Patient location during evaluation: PACU  Patient participation: complete - patient participated  Level of consciousness: awake  Airway patency: patent  Nausea & Vomiting: no vomiting and no nausea  Complications: no  Cardiovascular status: blood pressure returned to baseline and hemodynamically stable  Respiratory status: room air  Hydration status: stable  Multimodal analgesia pain management approach

## 2023-08-23 NOTE — OP NOTE
OPERATIVE REPORT    PATIENT INFORMATION:    Patient: Giovani Pal: [id number] SSN: [id number]  YOB: 1945 Age: 66 y.o. Sex: female      LOCATION OF SURGERY: [facility] [address] [facility], [address] [place] [address] Ave:  08/23/23      PRE-OPERATIVE DIAGNOSIS: Cataract Right eye. POST OPERATIVE DIAGNOSIS: Cataract Right eye. PROCEDURE PERFORMED: Phacoemulsification with intraocular lens Right eye. SURGEON: Nigel Umaña M.D. ANESTHESIA: Monitored anesthesia. COMPLICATIONS: None. BLOOD LOSS: None      INDICATIONS FOR PROCEDURE:  This is a 66y.o. year old female with progressively decreasing vision in the right eye. Risks, benefits and alternatives of surgery were explained at length with the patient and informed consent was obtained. PROCEDURE:  The patient was met in the pre-operative holding area where confirmation was made that the right eye was to be operated on and surgical eye was marked. The patient was taken to the operating room where anesthesia staff was present to give temporary sedation. Following the instillation of topical tetracaine drops to the operative eye the patient was then prepped and draped in the usual sterile fashion for ophthalmic surgery. The lid speculum was placed and the lids were retracted. A paracentesis was made through the clear cornea, shugarcaine was injected in the eye for improved dilation. The anterior chamber was deepened with viscoelastic material. A 2.4 mm keratome was used to make a self-sealing clear corneal incision. Capsulorrhexis was initiated with a cystotome and completed with Utrata forceps without difficulty. Hydrodissection was completed and good fluid wave was visualized. Phacoemulsification was initiated and completed in a divide and conquer fashion without difficulty.  Irrigation and aspiration was used to remove the residual cortical material from the capsular bag and then the capsular

## 2023-09-13 ENCOUNTER — HOSPITAL ENCOUNTER (OUTPATIENT)
Age: 78
Setting detail: OUTPATIENT SURGERY
Discharge: HOME | End: 2023-09-13
Attending: OPHTHALMOLOGY | Admitting: OPHTHALMOLOGY
Payer: COMMERCIAL

## 2023-09-13 ENCOUNTER — ANESTHESIA EVENT (OUTPATIENT)
Age: 78
End: 2023-09-13
Payer: COMMERCIAL

## 2023-09-13 ENCOUNTER — ANESTHESIA (OUTPATIENT)
Age: 78
End: 2023-09-13
Payer: COMMERCIAL

## 2023-09-13 VITALS
SYSTOLIC BLOOD PRESSURE: 125 MMHG | RESPIRATION RATE: 16 BRPM | HEART RATE: 63 BPM | DIASTOLIC BLOOD PRESSURE: 62 MMHG | TEMPERATURE: 97.8 F | OXYGEN SATURATION: 98 %

## 2023-09-13 PROCEDURE — 6360000002 HC RX W HCPCS

## 2023-09-13 PROCEDURE — 3700000001 HC ADD 15 MINUTES (ANESTHESIA): Performed by: OPHTHALMOLOGY

## 2023-09-13 PROCEDURE — 2500000003 HC RX 250 WO HCPCS: Performed by: OPHTHALMOLOGY

## 2023-09-13 PROCEDURE — V2632 POST CHMBR INTRAOCULAR LENS: HCPCS | Performed by: OPHTHALMOLOGY

## 2023-09-13 PROCEDURE — 3700000000 HC ANESTHESIA ATTENDED CARE: Performed by: OPHTHALMOLOGY

## 2023-09-13 PROCEDURE — 6360000002 HC RX W HCPCS: Performed by: OPHTHALMOLOGY

## 2023-09-13 PROCEDURE — 3600000012 HC SURGERY LEVEL 2 ADDTL 15MIN: Performed by: OPHTHALMOLOGY

## 2023-09-13 PROCEDURE — 2709999900 HC NON-CHARGEABLE SUPPLY: Performed by: OPHTHALMOLOGY

## 2023-09-13 PROCEDURE — 3600000002 HC SURGERY LEVEL 2 BASE: Performed by: OPHTHALMOLOGY

## 2023-09-13 PROCEDURE — 6370000000 HC RX 637 (ALT 250 FOR IP): Performed by: OPHTHALMOLOGY

## 2023-09-13 PROCEDURE — 7100000010 HC PHASE II RECOVERY - FIRST 15 MIN: Performed by: OPHTHALMOLOGY

## 2023-09-13 PROCEDURE — 2580000003 HC RX 258: Performed by: NURSE ANESTHETIST, CERTIFIED REGISTERED

## 2023-09-13 DEVICE — IMPLANTABLE DEVICE: Type: IMPLANTABLE DEVICE | Site: EYE | Status: FUNCTIONAL

## 2023-09-13 RX ORDER — EPINEPHRINE 1 MG/ML(1)
AMPUL (ML) INJECTION PRN
Status: DISCONTINUED | OUTPATIENT
Start: 2023-09-13 | End: 2023-09-13 | Stop reason: ALTCHOICE

## 2023-09-13 RX ORDER — SODIUM CHLORIDE 0.9 % (FLUSH) 0.9 %
5-40 SYRINGE (ML) INJECTION PRN
Status: DISCONTINUED | OUTPATIENT
Start: 2023-09-13 | End: 2023-09-13 | Stop reason: HOSPADM

## 2023-09-13 RX ORDER — SODIUM CHLORIDE 0.9 % (FLUSH) 0.9 %
5-40 SYRINGE (ML) INJECTION EVERY 12 HOURS SCHEDULED
Status: DISCONTINUED | OUTPATIENT
Start: 2023-09-13 | End: 2023-09-13 | Stop reason: HOSPADM

## 2023-09-13 RX ORDER — MIDAZOLAM HYDROCHLORIDE 1 MG/ML
INJECTION INTRAMUSCULAR; INTRAVENOUS PRN
Status: DISCONTINUED | OUTPATIENT
Start: 2023-09-13 | End: 2023-09-13 | Stop reason: SDUPTHER

## 2023-09-13 RX ORDER — TETRACAINE HYDROCHLORIDE 5 MG/ML
SOLUTION OPHTHALMIC PRN
Status: DISCONTINUED | OUTPATIENT
Start: 2023-09-13 | End: 2023-09-13 | Stop reason: ALTCHOICE

## 2023-09-13 RX ORDER — MOXIFLOXACIN 5 MG/ML
SOLUTION/ DROPS OPHTHALMIC PRN
Status: DISCONTINUED | OUTPATIENT
Start: 2023-09-13 | End: 2023-09-13 | Stop reason: ALTCHOICE

## 2023-09-13 RX ORDER — SODIUM CHLORIDE 9 MG/ML
INJECTION, SOLUTION INTRAVENOUS PRN
Status: CANCELLED | OUTPATIENT
Start: 2023-09-13

## 2023-09-13 RX ORDER — SODIUM CHLORIDE, SODIUM LACTATE, POTASSIUM CHLORIDE, CALCIUM CHLORIDE 600; 310; 30; 20 MG/100ML; MG/100ML; MG/100ML; MG/100ML
INJECTION, SOLUTION INTRAVENOUS CONTINUOUS
Status: DISCONTINUED | OUTPATIENT
Start: 2023-09-13 | End: 2023-09-13 | Stop reason: HOSPADM

## 2023-09-13 RX ORDER — SODIUM CHLORIDE 0.9 % (FLUSH) 0.9 %
5-40 SYRINGE (ML) INJECTION EVERY 12 HOURS SCHEDULED
Status: CANCELLED | OUTPATIENT
Start: 2023-09-13

## 2023-09-13 RX ORDER — SODIUM CHLORIDE 0.9 % (FLUSH) 0.9 %
5-40 SYRINGE (ML) INJECTION PRN
Status: CANCELLED | OUTPATIENT
Start: 2023-09-13

## 2023-09-13 RX ORDER — ONDANSETRON 2 MG/ML
4 INJECTION INTRAMUSCULAR; INTRAVENOUS
Status: CANCELLED | OUTPATIENT
Start: 2023-09-13 | End: 2023-09-14

## 2023-09-13 RX ORDER — SODIUM CHLORIDE 9 MG/ML
INJECTION, SOLUTION INTRAVENOUS PRN
Status: DISCONTINUED | OUTPATIENT
Start: 2023-09-13 | End: 2023-09-13 | Stop reason: HOSPADM

## 2023-09-13 RX ADMIN — Medication: at 06:58

## 2023-09-13 RX ADMIN — Medication 5 ML: at 07:54

## 2023-09-13 RX ADMIN — MIDAZOLAM HYDROCHLORIDE 2 MG: 2 INJECTION, SOLUTION INTRAMUSCULAR; INTRAVENOUS at 07:54

## 2023-09-13 ASSESSMENT — PAIN - FUNCTIONAL ASSESSMENT: PAIN_FUNCTIONAL_ASSESSMENT: NONE - DENIES PAIN

## 2023-09-13 ASSESSMENT — PAIN SCALES - GENERAL: PAINLEVEL_OUTOF10: 0

## 2023-09-13 ASSESSMENT — COPD QUESTIONNAIRES: CAT_SEVERITY: NO INTERVAL CHANGE

## 2023-09-13 NOTE — ANESTHESIA POSTPROCEDURE EVALUATION
Department of Anesthesiology  Postprocedure Note    Patient: Mable Rivaselor  MRN: [id number]  YOB: 1945  Date of evaluation: 9/13/2023      Procedure Summary     Date: 09/13/23 Room / Location: [facility] 01 / [facility] MAIN OR    Anesthesia Start: 0750 Anesthesia Stop: 0816    Procedure: PHACO IOL OS (Left: Eye) Diagnosis:       Nuclear sclerotic cataract of left eye      (Nuclear sclerotic cataract of left eye [H25.12])    Surgeons: Johanna Amaro MD Responsible Provider: CRISTINA Pappas CRNA    Anesthesia Type: MAC ASA Status: 3          Anesthesia Type: MAC    Senait Phase I:      Senait Phase II: Senait Score: 10      Anesthesia Post Evaluation    Patient location during evaluation: PACU  Patient participation: complete - patient participated  Level of consciousness: awake  Pain score: 0  Airway patency: patent  Nausea & Vomiting: no vomiting and no nausea  Complications: no  Cardiovascular status: blood pressure returned to baseline and hemodynamically stable  Respiratory status: room air  Hydration status: stable  Multimodal analgesia pain management approach  Pain management: adequate and satisfactory to patient

## 2023-09-13 NOTE — DISCHARGE INSTRUCTIONS
POST-OPERATIVE INSTRUCTIONS FOR CATARACT SURGERY     1. No heavy lifting (no more than 5 pounds). No Bending at waist and No strenuous activity for one (1) week. 2. DO NOT RUB YOUR EYE!!! Wear eye protection at all times when going outdoors (glasses, sunglasses,        ect) and wear shield while sleeping/napping for the first week after surgery. 3. DO NOT GET WATER IN YOUR EYES FOR THE NEXT 7 DAYS. You may gently clean your face and you        may shower, but don't put any pressure on the eye. Ladies, no eye makeup for one (1) week after surgery. Do not swim or get into a hot tub or pool for three (3) weeks. 4. You may experience eye irritation, aching, itching and blurred vision for several days. You may use over the         Counter PRESERVATIVE FREE Refresh or Systane as needed. Use Tylenol or Ibuprofen (Motrin) for         Discomfort but DO NOT RUB YOUR EYE . 5. Call your doctor with any increased pain, redness, nausea, vomiting, or worsening vision. Also call your doctor        with new flashes of light, many new floaters or a curtain/veil coming into your vision. [address] YOUR HANDS BEFORE PUTTING IN YOUR DROPS. ALLOW 5 MINUTES BETWEEN DIFFERENT DROPS.                           IF YOU HAVE ANY QUESTION OR CONCERNS:     DURING OFFICE HOURS CALL [phone]   OR AFTER HOURS / WEEKENDS CALL OR TEXT [phone]

## 2023-09-13 NOTE — OP NOTE
OPERATIVE REPORT    PATIENT INFORMATION:    Patient: Luna Craig: [id number] SSN: [id number]  YOB: 1945 Age: 66 y.o. Sex: female      LOCATION OF SURGERY: [facility] [address] [facility], [address] [place] [address] Ave:  09/13/23      PRE-OPERATIVE DIAGNOSIS: Cataract Left eye. POST OPERATIVE DIAGNOSIS: Cataract Left eye. PROCEDURE PERFORMED: Phacoemulsification with intraocular lens Left eye. SURGEON: Devon Collier M.D. ANESTHESIA: Monitored anesthesia. COMPLICATIONS: None. BLOOD LOSS: None      INDICATIONS FOR PROCEDURE:  This is a 66y.o. year old female with progressively decreasing vision in the left eye. Risks, benefits and alternatives of surgery were explained at length with the patient and informed consent was obtained. PROCEDURE:  The patient was met in the pre-operative holding area where confirmation was made that the left eye was to be operated on and surgical eye was marked. The patient was taken to the operating room where anesthesia staff was present to give temporary sedation. Following the instillation of topical tetracaine drops to the operative eye the patient was then prepped and draped in the usual sterile fashion for ophthalmic surgery. The lid speculum was placed and the lids were retracted. A paracentesis was made through the clear cornea, shugarcaine was injected in the eye for improved dilation. The anterior chamber was deepened with viscoelastic material. A 2.4 mm keratome was used to make a self-sealing clear corneal incision. Capsulorrhexis was initiated with a cystotome and completed with Utrata forceps without difficulty. Hydrodissection was completed and good fluid wave was visualized. Phacoemulsification was initiated and completed in a divide and conquer fashion without difficulty.  Irrigation and aspiration was used to remove the residual cortical material from the capsular bag and then the capsular bag and

## 2023-09-28 ENCOUNTER — OFFICE VISIT (OUTPATIENT)
Facility: CLINIC | Age: 78
End: 2023-09-28
Payer: COMMERCIAL

## 2023-09-28 VITALS
DIASTOLIC BLOOD PRESSURE: 67 MMHG | HEIGHT: 64 IN | HEART RATE: 68 BPM | TEMPERATURE: 97.2 F | BODY MASS INDEX: 34.38 KG/M2 | WEIGHT: 201.4 LBS | RESPIRATION RATE: 18 BRPM | OXYGEN SATURATION: 96 % | SYSTOLIC BLOOD PRESSURE: 117 MMHG

## 2023-09-28 DIAGNOSIS — R79.89 ELEVATED SERUM CREATININE: Primary | ICD-10-CM

## 2023-09-28 DIAGNOSIS — E55.9 VITAMIN D DEFICIENCY: ICD-10-CM

## 2023-09-28 DIAGNOSIS — M85.80 OSTEOPENIA, UNSPECIFIED LOCATION: ICD-10-CM

## 2023-09-28 DIAGNOSIS — R31.29 MICROSCOPIC HEMATURIA: ICD-10-CM

## 2023-09-28 DIAGNOSIS — E04.1 THYROID NODULE: ICD-10-CM

## 2023-09-28 PROCEDURE — 99205 OFFICE O/P NEW HI 60 MIN: CPT | Performed by: FAMILY MEDICINE

## 2023-09-28 PROCEDURE — 1123F ACP DISCUSS/DSCN MKR DOCD: CPT | Performed by: FAMILY MEDICINE

## 2023-09-28 PROCEDURE — 3074F SYST BP LT 130 MM HG: CPT | Performed by: FAMILY MEDICINE

## 2023-09-28 PROCEDURE — 3078F DIAST BP <80 MM HG: CPT | Performed by: FAMILY MEDICINE

## 2023-09-28 RX ORDER — CRANBERRY FRUIT EXTRACT 200 MG
200 CAPSULE ORAL 2 TIMES DAILY
COMMUNITY

## 2023-09-28 ASSESSMENT — PATIENT HEALTH QUESTIONNAIRE - PHQ9
SUM OF ALL RESPONSES TO PHQ9 QUESTIONS 1 & 2: 0
SUM OF ALL RESPONSES TO PHQ QUESTIONS 1-9: 0
SUM OF ALL RESPONSES TO PHQ QUESTIONS 1-9: 0
2. FEELING DOWN, DEPRESSED OR HOPELESS: 0
SUM OF ALL RESPONSES TO PHQ QUESTIONS 1-9: 0
1. LITTLE INTEREST OR PLEASURE IN DOING THINGS: 0
SUM OF ALL RESPONSES TO PHQ QUESTIONS 1-9: 0

## 2023-09-28 ASSESSMENT — ENCOUNTER SYMPTOMS
DIARRHEA: 0
CONSTIPATION: 0
NAUSEA: 0
SHORTNESS OF BREATH: 1
BLOOD IN STOOL: 0
COUGH: 1
VOMITING: 0
BACK PAIN: 1
TROUBLE SWALLOWING: 0
ABDOMINAL PAIN: 0

## 2023-11-06 ENCOUNTER — HOSPITAL ENCOUNTER (OUTPATIENT)
Age: 78
Discharge: HOME | End: 2023-11-09
Payer: COMMERCIAL

## 2023-11-06 DIAGNOSIS — R79.89 ELEVATED SERUM CREATININE: ICD-10-CM

## 2023-11-06 DIAGNOSIS — E04.1 THYROID NODULE: ICD-10-CM

## 2023-11-06 DIAGNOSIS — E55.9 VITAMIN D DEFICIENCY: ICD-10-CM

## 2023-11-06 DIAGNOSIS — R31.29 MICROSCOPIC HEMATURIA: ICD-10-CM

## 2023-11-06 LAB
25(OH)D3 SERPL-MCNC: 68.6 NG/ML (ref 30–100)
ANION GAP SERPL CALC-SCNC: 4 MMOL/L (ref 3–18)
BASOPHILS # BLD: 0.1 K/UL (ref 0–0.1)
BASOPHILS NFR BLD: 2 % (ref 0–2)
BUN SERPL-MCNC: 18 MG/DL (ref 7–18)
BUN/CREAT SERPL: 21 (ref 12–20)
CA-I BLD-MCNC: 9.3 MG/DL (ref 8.5–10.1)
CHLORIDE SERPL-SCNC: 108 MMOL/L (ref 100–111)
CO2 SERPL-SCNC: 29 MMOL/L (ref 21–32)
CREAT SERPL-MCNC: 0.87 MG/DL (ref 0.6–1.3)
CREAT UR-MCNC: 32 MG/DL (ref 30–125)
DIFFERENTIAL METHOD BLD: ABNORMAL
EOSINOPHIL # BLD: 0.2 K/UL (ref 0–0.4)
EOSINOPHIL NFR BLD: 3 % (ref 0–5)
ERYTHROCYTE [DISTWIDTH] IN BLOOD BY AUTOMATED COUNT: 12.5 % (ref 11.6–14.5)
GLUCOSE SERPL-MCNC: 103 MG/DL (ref 74–99)
HCT VFR BLD AUTO: 36.8 % (ref 35–45)
HGB BLD-MCNC: 12.1 G/DL (ref 12–16)
IMM GRANULOCYTES # BLD AUTO: 0 K/UL (ref 0–0.04)
IMM GRANULOCYTES NFR BLD AUTO: 1 % (ref 0–0.5)
LYMPHOCYTES # BLD: 1.5 K/UL (ref 0.9–3.6)
LYMPHOCYTES NFR BLD: 31 % (ref 21–52)
MCH RBC QN AUTO: 31.1 PG (ref 24–34)
MCHC RBC AUTO-ENTMCNC: 32.9 G/DL (ref 31–37)
MCV RBC AUTO: 94.6 FL (ref 78–100)
MICROALBUMIN UR-MCNC: 0.58 MG/DL (ref 0–3)
MICROALBUMIN/CREAT UR-RTO: 18 MGMALB/GCRE (ref 0–30)
MONOCYTES # BLD: 0.4 K/UL (ref 0.05–1.2)
MONOCYTES NFR BLD: 9 % (ref 3–10)
NEUTS SEG # BLD: 2.7 K/UL (ref 1.8–8)
NEUTS SEG NFR BLD: 54 % (ref 40–73)
NRBC # BLD: 0 K/UL (ref 0–0.01)
NRBC BLD-RTO: 0 PER 100 WBC
PLATELET # BLD AUTO: 229 K/UL (ref 135–420)
PMV BLD AUTO: 10.3 FL (ref 9.2–11.8)
POTASSIUM SERPL-SCNC: 4.2 MMOL/L (ref 3.5–5.5)
RBC # BLD AUTO: 3.89 M/UL (ref 4.2–5.3)
SODIUM SERPL-SCNC: 141 MMOL/L (ref 136–145)
TSH SERPL DL<=0.05 MIU/L-ACNC: 1.16 UIU/ML (ref 0.36–3.74)
WBC # BLD AUTO: 4.8 K/UL (ref 4.6–13.2)

## 2023-11-06 PROCEDURE — 85025 COMPLETE CBC W/AUTO DIFF WBC: CPT

## 2023-11-06 PROCEDURE — 82570 ASSAY OF URINE CREATININE: CPT

## 2023-11-06 PROCEDURE — 82043 UR ALBUMIN QUANTITATIVE: CPT

## 2023-11-06 PROCEDURE — 84443 ASSAY THYROID STIM HORMONE: CPT

## 2023-11-06 PROCEDURE — 80048 BASIC METABOLIC PNL TOTAL CA: CPT

## 2023-11-06 PROCEDURE — 82306 VITAMIN D 25 HYDROXY: CPT

## 2023-11-06 PROCEDURE — 36415 COLL VENOUS BLD VENIPUNCTURE: CPT

## 2023-11-08 DIAGNOSIS — R05.3 CHRONIC COUGH: ICD-10-CM

## 2023-11-08 RX ORDER — LOSARTAN POTASSIUM 25 MG/1
TABLET ORAL
Qty: 90 TABLET | Refills: 3 | OUTPATIENT
Start: 2023-11-08

## 2023-11-08 RX ORDER — SIMVASTATIN 20 MG
TABLET ORAL
Qty: 90 TABLET | Refills: 1 | OUTPATIENT
Start: 2023-11-08

## 2023-11-08 NOTE — TELEPHONE ENCOUNTER
Please refuse, patient changed PCP.    Patient reached and made aware to contact new pcp, Dr Corcoran, for refills, she verbalized understanding and has no further questions at this time.

## 2023-11-15 ENCOUNTER — OFFICE VISIT (OUTPATIENT)
Facility: CLINIC | Age: 78
End: 2023-11-15
Payer: COMMERCIAL

## 2023-11-15 VITALS
DIASTOLIC BLOOD PRESSURE: 65 MMHG | WEIGHT: 202.2 LBS | HEART RATE: 69 BPM | BODY MASS INDEX: 34.52 KG/M2 | OXYGEN SATURATION: 96 % | SYSTOLIC BLOOD PRESSURE: 118 MMHG | HEIGHT: 64 IN | TEMPERATURE: 97.3 F | RESPIRATION RATE: 20 BRPM

## 2023-11-15 DIAGNOSIS — Z98.84 STATUS POST GASTRIC BYPASS FOR OBESITY: ICD-10-CM

## 2023-11-15 DIAGNOSIS — Z13.820 ENCOUNTER FOR OSTEOPOROSIS SCREENING IN ASYMPTOMATIC POSTMENOPAUSAL PATIENT: ICD-10-CM

## 2023-11-15 DIAGNOSIS — Z78.0 ENCOUNTER FOR OSTEOPOROSIS SCREENING IN ASYMPTOMATIC POSTMENOPAUSAL PATIENT: ICD-10-CM

## 2023-11-15 DIAGNOSIS — I10 ESSENTIAL (PRIMARY) HYPERTENSION: Primary | ICD-10-CM

## 2023-11-15 DIAGNOSIS — E78.2 MIXED HYPERLIPIDEMIA: ICD-10-CM

## 2023-11-15 PROCEDURE — 3078F DIAST BP <80 MM HG: CPT | Performed by: FAMILY MEDICINE

## 2023-11-15 PROCEDURE — 99213 OFFICE O/P EST LOW 20 MIN: CPT | Performed by: FAMILY MEDICINE

## 2023-11-15 PROCEDURE — 3074F SYST BP LT 130 MM HG: CPT | Performed by: FAMILY MEDICINE

## 2023-11-15 PROCEDURE — 1123F ACP DISCUSS/DSCN MKR DOCD: CPT | Performed by: FAMILY MEDICINE

## 2023-11-15 RX ORDER — MECLIZINE HYDROCHLORIDE 25 MG/1
25 TABLET ORAL 3 TIMES DAILY PRN
COMMUNITY

## 2023-11-15 RX ORDER — SIMVASTATIN 20 MG
20 TABLET ORAL EVERY EVENING
Qty: 90 TABLET | Refills: 3 | Status: SHIPPED | OUTPATIENT
Start: 2023-11-15

## 2023-11-15 RX ORDER — LOSARTAN POTASSIUM 25 MG/1
25 TABLET ORAL DAILY
Qty: 90 TABLET | Refills: 3 | Status: SHIPPED | OUTPATIENT
Start: 2023-11-15

## 2023-11-15 RX ORDER — ESTRADIOL 0.1 MG/G
2 CREAM VAGINAL
COMMUNITY

## 2023-11-15 RX ORDER — M-VIT,TX,IRON,MINS/CALC/FOLIC 27MG-0.4MG
1 TABLET ORAL DAILY
COMMUNITY

## 2023-11-15 ASSESSMENT — PATIENT HEALTH QUESTIONNAIRE - PHQ9
SUM OF ALL RESPONSES TO PHQ QUESTIONS 1-9: 0
SUM OF ALL RESPONSES TO PHQ QUESTIONS 1-9: 0
2. FEELING DOWN, DEPRESSED OR HOPELESS: 0
1. LITTLE INTEREST OR PLEASURE IN DOING THINGS: 0
SUM OF ALL RESPONSES TO PHQ QUESTIONS 1-9: 0
SUM OF ALL RESPONSES TO PHQ9 QUESTIONS 1 & 2: 0
SUM OF ALL RESPONSES TO PHQ QUESTIONS 1-9: 0

## 2023-11-15 NOTE — PROGRESS NOTES
Pete Schwartz (: 1945) is a 66 y.o. female here for evaluation of the following chief concern(s):  Chronic condition management     ASSESSMENT/PLAN:  1. Essential (primary) hypertension  -     losartan (COZAAR) 25 MG tablet; Take 1 tablet by mouth daily, Disp-90 tablet, R-3Normal  2. Mixed hyperlipidemia  -     simvastatin (ZOCOR) 20 MG tablet; Take 1 tablet by mouth every evening, Disp-90 tablet, R-3Normal  3. Status post gastric bypass for obesity  -     Hemoglobin A1C; Future  -     Magnesium; Future  -     Phosphorus; Future  -     Vitamin B1, Whole Blood; Future  -     Vitamin B12 & Folate; Future  -     Iron and TIBC; Future  -     Ferritin; Future  4. Encounter for osteoporosis screening in asymptomatic postmenopausal patient  -     DEXA BONE DENSITY AXIAL SKELETON; Future    Mrs. Lucio Sharma appears medically stable, normal hemodynamics including blood pressure. Pt will continue to think about Pulmonology referral and/or PFTs. Additional labs/studies as above for further evaluation of s/p bypass. Return in about 3 months (around 2/15/2024) for follow-up chronic conditions or sooner if needed, DEXA and labs 1 week prior to appointment. Pete Schwartz agrees with plan as above and has no additional questions at this time. SUBJECTIVE/OBJECTIVE:  Overall, pt is feeling \"very good\". No acute concerns, significant changes to health or chart updates since our 5 [facility] visit on 23.    23 results review;  CBC WNL- 1% immature granulocyte  BMP WNL- Cr 0.87, k+ 4.2, - pt was fasting, hx prediabetes  (2021)  TSH 1.16; hx thyroid nodules  Vit D 68.8  Ualb:Cr 18      HTN, HLD:  Meds: Losartan 25mg, Simvastatin 20mg    Prediabetes:  HA1C 2023 5.6%  Pt works diligently on her nutrition. Exercise limited by herniated discs in back, sciatica- LLE can go numb if she stands for prolonged periods of time.     23 Cr 0.91    COPD and asthma, lung nodules:  Breathing is variable,

## 2023-11-15 NOTE — PROGRESS NOTES
Chief Complaint   Patient presents with    Follow-up     Chronic disease     1. \"Have you been to the ER, urgent care clinic since your last visit? Hospitalized since your last visit? \" No    2. \"Have you seen or consulted any other health care providers outside of the [address] [place] [address] since your last visit? \" No     3. For patients aged 43-73: Has the patient had a colonoscopy / FIT/ Cologuard? Yes - no Care Gap present      If the patient is female:    4. For patients aged 43-66: Has the patient had a mammogram within the past 2 years? Yes - no Care Gap present      5. For patients aged 21-65: Has the patient had a pap smear?  NA - based on age or sex

## 2024-01-29 ENCOUNTER — HOSPITAL ENCOUNTER (OUTPATIENT)
Age: 79
Discharge: HOME | End: 2024-02-01
Payer: COMMERCIAL

## 2024-01-29 DIAGNOSIS — Z98.84 STATUS POST GASTRIC BYPASS FOR OBESITY: ICD-10-CM

## 2024-01-29 LAB
EST. AVERAGE GLUCOSE BLD GHB EST-MCNC: 114 MG/DL
FERRITIN SERPL-MCNC: 122 NG/ML (ref 8–388)
FOLATE SERPL-MCNC: >20 NG/ML (ref 3.1–17.5)
HBA1C MFR BLD: 5.6 % (ref 4.2–5.6)
IRON SATN MFR SERPL: 38 % (ref 20–50)
IRON SERPL-MCNC: 127 UG/DL (ref 50–175)
MAGNESIUM SERPL-MCNC: 2 MG/DL (ref 1.6–2.6)
PHOSPHATE SERPL-MCNC: 3.7 MG/DL (ref 2.5–4.9)
TIBC SERPL-MCNC: 335 UG/DL (ref 250–450)
VIT B12 SERPL-MCNC: 516 PG/ML (ref 211–911)

## 2024-01-29 PROCEDURE — 82607 VITAMIN B-12: CPT

## 2024-01-29 PROCEDURE — 83735 ASSAY OF MAGNESIUM: CPT

## 2024-01-29 PROCEDURE — 83036 HEMOGLOBIN GLYCOSYLATED A1C: CPT

## 2024-01-29 PROCEDURE — 36415 COLL VENOUS BLD VENIPUNCTURE: CPT

## 2024-01-29 PROCEDURE — 82746 ASSAY OF FOLIC ACID SERUM: CPT

## 2024-01-29 PROCEDURE — 84425 ASSAY OF VITAMIN B-1: CPT

## 2024-01-29 PROCEDURE — 82728 ASSAY OF FERRITIN: CPT

## 2024-01-29 PROCEDURE — 83540 ASSAY OF IRON: CPT

## 2024-01-29 PROCEDURE — 84100 ASSAY OF PHOSPHORUS: CPT

## 2024-01-30 LAB — VIT B1 BLD-SCNC: NORMAL NMOL/L

## 2024-02-15 ENCOUNTER — HOSPITAL ENCOUNTER (OUTPATIENT)
Facility: HOSPITAL | Age: 79
Discharge: HOME | End: 2024-02-15
Attending: FAMILY MEDICINE
Payer: COMMERCIAL

## 2024-02-15 DIAGNOSIS — Z78.0 ENCOUNTER FOR OSTEOPOROSIS SCREENING IN ASYMPTOMATIC POSTMENOPAUSAL PATIENT: ICD-10-CM

## 2024-02-15 DIAGNOSIS — Z13.820 ENCOUNTER FOR OSTEOPOROSIS SCREENING IN ASYMPTOMATIC POSTMENOPAUSAL PATIENT: ICD-10-CM

## 2024-02-15 PROCEDURE — 77080 DXA BONE DENSITY AXIAL: CPT

## 2024-02-16 ENCOUNTER — TELEMEDICINE (OUTPATIENT)
Facility: CLINIC | Age: 79
End: 2024-02-16

## 2024-02-16 DIAGNOSIS — Z91.89 FRACTURE RISK ASSESSMENT SCORE (FRAX) INDICATING GREATER THAN 20% RISK FOR MAJOR OSTEOPOROSIS-RELATED FRACTURE: ICD-10-CM

## 2024-02-16 DIAGNOSIS — M85.89 OSTEOPENIA OF MULTIPLE SITES: Primary | ICD-10-CM

## 2024-02-16 DIAGNOSIS — Z91.89 FRACTURE RISK ASSESSMENT SCORE (FRAX) INDICATING GREATER THAN 3% RISK FOR HIP FRACTURE: ICD-10-CM

## 2024-02-16 DIAGNOSIS — Z98.84 STATUS POST GASTRIC BYPASS FOR OBESITY: ICD-10-CM

## 2024-02-16 RX ORDER — ALENDRONATE SODIUM 35 MG/1
35 TABLET ORAL
Qty: 4 TABLET | Refills: 5 | Status: SHIPPED | OUTPATIENT
Start: 2024-02-16 | End: 2024-02-16 | Stop reason: SINTOL

## 2024-02-16 NOTE — PROGRESS NOTES
Chief Complaint   Patient presents with    Follow-up     Review Dexa scan and lab results       \"Have you been to the ER, urgent care clinic since your last visit?  Hospitalized since your last visit?\"    NO    “Have you seen or consulted any other health care providers outside of [facility] since your last visit?”    NO            
Expenses: Not hard at all   Food Insecurity: Not on file (2023)   Transportation Needs: Unknown (2023)    PRAPARE - Transportation     Lack of Transportation (Medical): Not on file     Lack of Transportation (Non-Medical): No   Physical Activity: Not on file   Stress: Not on file   Social Connections: Not on file   Intimate Partner Violence: Not on file   Housing Stability: Unknown (2023)    Housing Stability Vital Sign     Unable to Pay for Housing in the Last Year: Not on file     Number of Places Lived in the Last Year: Not on file     Unstable Housing in the Last Year: No       Social History     Tobacco Use   Smoking Status Former    Current packs/day: 0.00    Average packs/day: 3.0 packs/day for 20.0 years (60.0 ttl pk-yrs)    Types: Cigarettes    Start date: 1963    Quit date: 1983    Years since quittin.1    Passive exposure: Past   Smokeless Tobacco Never         Current Outpatient Medications:     Multiple Vitamins-Minerals (THERAPEUTIC MULTIVITAMIN-MINERALS) tablet, Take 1 tablet by mouth daily, Disp: , Rfl:     vitamin D 25 MCG (1000 UT) CAPS, Take 1 capsule by mouth in the morning and at bedtime, Disp: , Rfl:     simvastatin (ZOCOR) 20 MG tablet, Take 1 tablet by mouth every evening, Disp: 90 tablet, Rfl: 3    losartan (COZAAR) 25 MG tablet, Take 1 tablet by mouth daily, Disp: 90 tablet, Rfl: 3    Cranberry 200 MG CAPS, Take 200 mg by mouth in the morning and at bedtime OTC, Disp: , Rfl:     albuterol sulfate HFA (PROVENTIL;VENTOLIN;PROAIR) 108 (90 Base) MCG/ACT inhaler, Inhale 1-2 puffs into the lungs every 4 hours as needed, Disp: , Rfl:     omeprazole (PRILOSEC) 20 MG delayed release capsule, Take 1 capsule by mouth daily, Disp: , Rfl:     Allergies   Allergen Reactions    Nitrofurantoin Anaphylaxis    Aspirin      Other reaction(s): Unknown (comments)  Cannot take due to gastric bypass.    Chlorzoxazone Other (See Comments)     Excessive drowsiness    Lisinopril Cough

## 2024-08-20 ENCOUNTER — OFFICE VISIT (OUTPATIENT)
Facility: CLINIC | Age: 79
End: 2024-08-20
Payer: COMMERCIAL

## 2024-08-20 VITALS
BODY MASS INDEX: 32.44 KG/M2 | DIASTOLIC BLOOD PRESSURE: 72 MMHG | HEIGHT: 64 IN | WEIGHT: 190 LBS | RESPIRATION RATE: 22 BRPM | OXYGEN SATURATION: 95 % | TEMPERATURE: 97.2 F | HEART RATE: 70 BPM | SYSTOLIC BLOOD PRESSURE: 120 MMHG

## 2024-08-20 DIAGNOSIS — I10 ESSENTIAL (PRIMARY) HYPERTENSION: ICD-10-CM

## 2024-08-20 DIAGNOSIS — E78.2 MIXED HYPERLIPIDEMIA: ICD-10-CM

## 2024-08-20 DIAGNOSIS — U07.1 COVID: ICD-10-CM

## 2024-08-20 DIAGNOSIS — J44.89 ASTHMA WITH COPD (HCC): ICD-10-CM

## 2024-08-20 DIAGNOSIS — J45.21 MILD INTERMITTENT ASTHMA WITH EXACERBATION: Primary | ICD-10-CM

## 2024-08-20 DIAGNOSIS — R53.83 FATIGUE, UNSPECIFIED TYPE: ICD-10-CM

## 2024-08-20 LAB
EXP DATE SOLUTION: NORMAL
EXP DATE SWAB: NORMAL
EXPIRATION DATE: NORMAL
LOT NUMBER POC: NORMAL
LOT NUMBER SOLUTION: NORMAL
LOT NUMBER SWAB: NORMAL
SARS-COV-2 RNA, POC: POSITIVE

## 2024-08-20 PROCEDURE — 3074F SYST BP LT 130 MM HG: CPT | Performed by: FAMILY MEDICINE

## 2024-08-20 PROCEDURE — 1123F ACP DISCUSS/DSCN MKR DOCD: CPT | Performed by: FAMILY MEDICINE

## 2024-08-20 PROCEDURE — 87635 SARS-COV-2 COVID-19 AMP PRB: CPT | Performed by: FAMILY MEDICINE

## 2024-08-20 PROCEDURE — 99214 OFFICE O/P EST MOD 30 MIN: CPT | Performed by: FAMILY MEDICINE

## 2024-08-20 PROCEDURE — 3078F DIAST BP <80 MM HG: CPT | Performed by: FAMILY MEDICINE

## 2024-08-20 RX ORDER — PREDNISONE 20 MG/1
40 TABLET ORAL DAILY
Qty: 10 TABLET | Refills: 0 | Status: SHIPPED | OUTPATIENT
Start: 2024-08-20 | End: 2024-08-25

## 2024-08-20 RX ORDER — PREDNISONE 20 MG/1
20 TABLET ORAL DAILY
Qty: 10 TABLET | Refills: 0 | Status: SHIPPED | OUTPATIENT
Start: 2024-08-20 | End: 2024-08-20

## 2024-08-20 SDOH — ECONOMIC STABILITY: FOOD INSECURITY: WITHIN THE PAST 12 MONTHS, YOU WORRIED THAT YOUR FOOD WOULD RUN OUT BEFORE YOU GOT MONEY TO BUY MORE.: PATIENT DECLINED

## 2024-08-20 SDOH — ECONOMIC STABILITY: INCOME INSECURITY: HOW HARD IS IT FOR YOU TO PAY FOR THE VERY BASICS LIKE FOOD, HOUSING, MEDICAL CARE, AND HEATING?: PATIENT DECLINED

## 2024-08-20 SDOH — ECONOMIC STABILITY: FOOD INSECURITY: WITHIN THE PAST 12 MONTHS, THE FOOD YOU BOUGHT JUST DIDN'T LAST AND YOU DIDN'T HAVE MONEY TO GET MORE.: PATIENT DECLINED

## 2024-08-20 ASSESSMENT — PATIENT HEALTH QUESTIONNAIRE - PHQ9
SUM OF ALL RESPONSES TO PHQ9 QUESTIONS 1 & 2: 0
SUM OF ALL RESPONSES TO PHQ QUESTIONS 1-9: 0
SUM OF ALL RESPONSES TO PHQ QUESTIONS 1-9: 0
1. LITTLE INTEREST OR PLEASURE IN DOING THINGS: NOT AT ALL
2. FEELING DOWN, DEPRESSED OR HOPELESS: NOT AT ALL
SUM OF ALL RESPONSES TO PHQ QUESTIONS 1-9: 0
SUM OF ALL RESPONSES TO PHQ QUESTIONS 1-9: 0

## 2024-08-20 NOTE — PROGRESS NOTES
May Chadwick (: 1945) is a 79 y.o. female here for evaluation of the following chief concern(s):  Chronic condition management       ASSESSMENT/PLAN:  1. Mild intermittent asthma with exacerbation  -     predniSONE (DELTASONE) 20 MG tablet; Take 2 tablets by mouth daily for 5 days .  Take with food., Disp-10 tablet, R-0CLARIFY SIGNormal  2. Asthma with COPD (HCC)  -     predniSONE (DELTASONE) 20 MG tablet; Take 2 tablets by mouth daily for 5 days .  Take with food., Disp-10 tablet, R-0CLARIFY SIGNormal  3. COVID  -     AMB POC COVID-19 COV  -     predniSONE (DELTASONE) 20 MG tablet; Take 2 tablets by mouth daily for 5 days .  Take with food., Disp-10 tablet, R-0CLARIFY SIGNormal  4. Fatigue, unspecified type  -     AMB POC COVID-19 COV  5. Essential (primary) hypertension  -     Basic Metabolic Panel; Future  6. Mixed hyperlipidemia  -     Lipid Panel; Future    Mrs. Chadwick appears medically stable.  Afebrile, normal hemodynamics, normotensive, no hypoxia.  She is on day approximately 7 of symptoms.  We reviewed general symptoms and timeline with COVID infection.  I do not recommend for treatment with Paxlovid at this time.  Will treat with corticosteroid burst given patient's report of significant cough and increased need for bronchodilator.  I advised need for close monitoring, infection control, strict ER precautions.    Return in about 4 weeks (around 2024) for follow-up chronic conditions or sooner if needed- LABS PRIOR.    May Chadwick agrees with plan as above and has no additional questions at this time.       SUBJECTIVE/OBJECTIVE:    Onset of symptoms last week, overall improving.  Patient reports exposure may have occurred at [employer], and lady has let her know that she was just getting over COVID.  Her  is also sick, but his symptoms are not as severe.  Patient reports malaise, fatigue, cough.  No recent fevers.  No trouble breathing.  She has been using her albuterol 1-2 times per day,

## 2024-08-20 NOTE — PROGRESS NOTES
Chief Complaint   Patient presents with    Follow-up     Chronic disease   Patient states she had been sick about 2 weeks, sore throat, cough, extreme fatigue. Last fever was about last Saturday low grade 99.5  Continues to have bad cough and extreme fatigue. Went to [facility] 8/4/24 and Tuesday she started feeling sick.  \"Have you been to the ER, urgent care clinic since your last visit?  Hospitalized since your last visit?\"    NO    “Have you seen or consulted any other health care providers outside of [facility] since your last visit?”    NO

## 2024-10-14 ENCOUNTER — HOSPITAL ENCOUNTER (OUTPATIENT)
Age: 79
Setting detail: SPECIMEN
Discharge: HOME | End: 2024-10-17
Payer: COMMERCIAL

## 2024-10-14 DIAGNOSIS — I10 ESSENTIAL (PRIMARY) HYPERTENSION: ICD-10-CM

## 2024-10-14 DIAGNOSIS — E78.2 MIXED HYPERLIPIDEMIA: ICD-10-CM

## 2024-10-14 LAB
ANION GAP SERPL CALC-SCNC: 3 MMOL/L (ref 3–18)
BUN SERPL-MCNC: 21 MG/DL (ref 7–18)
BUN/CREAT SERPL: 22 (ref 12–20)
CA-I BLD-MCNC: 10 MG/DL (ref 8.5–10.1)
CHLORIDE SERPL-SCNC: 108 MMOL/L (ref 100–111)
CHOLEST SERPL-MCNC: 154 MG/DL
CO2 SERPL-SCNC: 29 MMOL/L (ref 21–32)
CREAT SERPL-MCNC: 0.97 MG/DL (ref 0.6–1.3)
GLUCOSE SERPL-MCNC: 110 MG/DL (ref 74–99)
HDLC SERPL-MCNC: 57 MG/DL (ref 40–60)
HDLC SERPL: 2.7 (ref 0–5)
LDLC SERPL CALC-MCNC: 65.8 MG/DL (ref 0–100)
LIPID PANEL: ABNORMAL
POTASSIUM SERPL-SCNC: 4.8 MMOL/L (ref 3.5–5.5)
SODIUM SERPL-SCNC: 140 MMOL/L (ref 136–145)
TRIGL SERPL-MCNC: 156 MG/DL
VLDLC SERPL CALC-MCNC: 31.2 MG/DL

## 2024-10-14 PROCEDURE — 36415 COLL VENOUS BLD VENIPUNCTURE: CPT

## 2024-10-14 PROCEDURE — 80061 LIPID PANEL: CPT

## 2024-10-14 PROCEDURE — 80048 BASIC METABOLIC PNL TOTAL CA: CPT

## 2024-10-16 ENCOUNTER — OFFICE VISIT (OUTPATIENT)
Facility: CLINIC | Age: 79
End: 2024-10-16
Payer: COMMERCIAL

## 2024-10-16 VITALS
DIASTOLIC BLOOD PRESSURE: 64 MMHG | HEIGHT: 64 IN | WEIGHT: 191 LBS | TEMPERATURE: 97.5 F | HEART RATE: 79 BPM | SYSTOLIC BLOOD PRESSURE: 106 MMHG | OXYGEN SATURATION: 96 % | RESPIRATION RATE: 22 BRPM | BODY MASS INDEX: 32.61 KG/M2

## 2024-10-16 DIAGNOSIS — R41.3 MEMORY CHANGES: ICD-10-CM

## 2024-10-16 DIAGNOSIS — J44.89 ASTHMA WITH COPD (HCC): ICD-10-CM

## 2024-10-16 DIAGNOSIS — E04.1 THYROID NODULE: ICD-10-CM

## 2024-10-16 DIAGNOSIS — Z98.84 STATUS POST GASTRIC BYPASS FOR OBESITY: ICD-10-CM

## 2024-10-16 DIAGNOSIS — Z91.89 FRACTURE RISK ASSESSMENT SCORE (FRAX) INDICATING GREATER THAN 20% RISK FOR MAJOR OSTEOPOROSIS-RELATED FRACTURE: ICD-10-CM

## 2024-10-16 DIAGNOSIS — Z91.89 FRACTURE RISK ASSESSMENT SCORE (FRAX) INDICATING GREATER THAN 3% RISK FOR HIP FRACTURE: ICD-10-CM

## 2024-10-16 DIAGNOSIS — I10 ESSENTIAL (PRIMARY) HYPERTENSION: ICD-10-CM

## 2024-10-16 DIAGNOSIS — E55.9 VITAMIN D DEFICIENCY: ICD-10-CM

## 2024-10-16 DIAGNOSIS — R73.03 PREDIABETES: ICD-10-CM

## 2024-10-16 DIAGNOSIS — J45.21 MILD INTERMITTENT ASTHMA WITH EXACERBATION: Primary | ICD-10-CM

## 2024-10-16 PROCEDURE — 1123F ACP DISCUSS/DSCN MKR DOCD: CPT | Performed by: FAMILY MEDICINE

## 2024-10-16 PROCEDURE — 3074F SYST BP LT 130 MM HG: CPT | Performed by: FAMILY MEDICINE

## 2024-10-16 PROCEDURE — 99214 OFFICE O/P EST MOD 30 MIN: CPT | Performed by: FAMILY MEDICINE

## 2024-10-16 PROCEDURE — 3078F DIAST BP <80 MM HG: CPT | Performed by: FAMILY MEDICINE

## 2024-10-16 RX ORDER — MONTELUKAST SODIUM 10 MG/1
10 TABLET ORAL
Qty: 30 TABLET | Refills: 3 | Status: SHIPPED | OUTPATIENT
Start: 2024-10-16

## 2024-10-16 RX ORDER — PREDNISONE 20 MG/1
TABLET ORAL
Qty: 10 TABLET | Refills: 0 | Status: SHIPPED | OUTPATIENT
Start: 2024-10-16

## 2024-10-16 RX ORDER — PREDNISONE 20 MG/1
20 TABLET ORAL DAILY
Qty: 10 TABLET | Refills: 0 | Status: SHIPPED | OUTPATIENT
Start: 2024-10-16 | End: 2024-10-16

## 2024-10-16 RX ORDER — FLUTICASONE PROPIONATE AND SALMETEROL XINAFOATE 115; 21 UG/1; UG/1
1 AEROSOL, METERED RESPIRATORY (INHALATION) 2 TIMES DAILY
Qty: 1 EACH | Refills: 0 | Status: SHIPPED | OUTPATIENT
Start: 2024-10-16

## 2024-10-16 ASSESSMENT — PATIENT HEALTH QUESTIONNAIRE - PHQ9
SUM OF ALL RESPONSES TO PHQ QUESTIONS 1-9: 0
SUM OF ALL RESPONSES TO PHQ QUESTIONS 1-9: 0
2. FEELING DOWN, DEPRESSED OR HOPELESS: NOT AT ALL
1. LITTLE INTEREST OR PLEASURE IN DOING THINGS: NOT AT ALL
SUM OF ALL RESPONSES TO PHQ QUESTIONS 1-9: 0
SUM OF ALL RESPONSES TO PHQ9 QUESTIONS 1 & 2: 0
SUM OF ALL RESPONSES TO PHQ QUESTIONS 1-9: 0

## 2024-10-16 NOTE — PROGRESS NOTES
May Chadwick (: 1945) is a 79 y.o. female here for evaluation of the following chief concern(s):  Chronic condition management       ASSESSMENT/PLAN:  1. Mild intermittent asthma with exacerbation  -     fluticasone-salmeterol (ADVAIR HFA) 115-21 MCG/ACT inhaler; Inhale 1 puff into the lungs 2 times daily .  Rinse mouth after use., Disp-1 each, R-0Normal  -     montelukast (SINGULAIR) 10 MG tablet; Take 1 tablet by mouth nightly, Disp-30 tablet, R-3Normal  -     predniSONE (DELTASONE) 20 MG tablet; Take two tablets by mouth in the morning with food for five days to help breathing/inflammation., Disp-10 tablet, R-0CLARIFY SIG.Normal  2. Asthma with COPD (HCC)  -     fluticasone-salmeterol (ADVAIR HFA) 115-21 MCG/ACT inhaler; Inhale 1 puff into the lungs 2 times daily .  Rinse mouth after use., Disp-1 each, R-0Normal  -     montelukast (SINGULAIR) 10 MG tablet; Take 1 tablet by mouth nightly, Disp-30 tablet, R-3Normal  -     Full PFT Study With Bronchodilator; Future  3. Essential (primary) hypertension  -     Comprehensive Metabolic Panel; Future  -     CBC with Auto Differential; Future  4. Thyroid nodule  -     TSH; Future  5. Vitamin D deficiency  -     Vitamin D 25 Hydroxy; Future  6. Prediabetes  -     Comprehensive Metabolic Panel; Future  -     Hemoglobin A1C; Future  7. Memory changes  -     Vitamin B1, Whole Blood; Future  8. Fracture Risk Assessment Score (FRAX) indicating greater than 20% risk for major osteoporosis-related fracture  9. Fracture Risk Assessment Score (FRAX) indicating greater than 3% risk for hip fracture  10. Status post gastric bypass for obesity  -     Comprehensive Metabolic Panel; Future  -     Hemoglobin A1C; Future  -     Vitamin B12 & Folate; Future  -     Vitamin B1, Whole Blood; Future    Mrs. Chadwick appears medically stable.  Afebrile, normal hemodynamics, normotensive, no hypoxia.  She has had increased respiratory insuffienct for months; we will try to break the cycle

## 2024-10-16 NOTE — PROGRESS NOTES
Shoulders,low back and right hip pain    Chief Complaint   Patient presents with    Follow-up     Chronic disease       \"Have you been to the ER, urgent care clinic since your last visit?  Hospitalized since your last visit?\"    NO    “Have you seen or consulted any other health care providers outside of [facility] since your last visit?”    NO

## 2024-10-17 DIAGNOSIS — J45.21 MILD INTERMITTENT ASTHMA WITH EXACERBATION: ICD-10-CM

## 2024-10-17 DIAGNOSIS — J44.89 ASTHMA WITH COPD (HCC): ICD-10-CM

## 2024-10-17 RX ORDER — MONTELUKAST SODIUM 10 MG/1
10 TABLET ORAL NIGHTLY
Qty: 90 TABLET | Refills: 1 | OUTPATIENT
Start: 2024-10-17

## 2024-10-28 ENCOUNTER — HOSPITAL ENCOUNTER (OUTPATIENT)
Facility: HOSPITAL | Age: 79
Discharge: HOME | End: 2024-10-31
Payer: COMMERCIAL

## 2024-10-28 VITALS — WEIGHT: 192.2 LBS | BODY MASS INDEX: 32.99 KG/M2

## 2024-10-28 DIAGNOSIS — J44.89 ASTHMA WITH COPD (HCC): ICD-10-CM

## 2024-10-28 LAB
DLCO %PRED: ABNORMAL
DLCO PRED: ABNORMAL
DLCO/VA %PRED: ABNORMAL
DLCO/VA PRED: ABNORMAL
DLCO/VA: ABNORMAL
DLCO: ABNORMAL
EXPIRATORY TIME-POST: ABNORMAL
EXPIRATORY TIME: ABNORMAL
FEF 25-75 %CHNG: ABNORMAL
FEF 25-75 POST %PRED: ABNORMAL
FEF 25-75% %PRED-PRE: ABNORMAL
FEF 25-75% PRED: ABNORMAL
FEF 25-75-POST: ABNORMAL
FEF 25-75-PRE: ABNORMAL
FEV1 %PRED-POST: ABNORMAL
FEV1 %PRED-PRE: ABNORMAL
FEV1 PRED: ABNORMAL
FEV1-POST: ABNORMAL
FEV1-PRE: ABNORMAL
FEV1/FVC %PRED-POST: ABNORMAL
FEV1/FVC %PRED-PRE: ABNORMAL
FEV1/FVC PRED: ABNORMAL
FEV1/FVC-POST: ABNORMAL
FEV1/FVC-PRE: ABNORMAL
FVC %PRED-POST: ABNORMAL
FVC %PRED-PRE: ABNORMAL
FVC PRED: ABNORMAL
FVC-POST: ABNORMAL
FVC-PRE: ABNORMAL
GAW %PRED: ABNORMAL
GAW PRED: ABNORMAL
GAW: ABNORMAL
IC PRE %PRED: ABNORMAL
IC PRED: ABNORMAL
IC: ABNORMAL
MEP: ABNORMAL
MIP: ABNORMAL
MVV %PRED-PRE: ABNORMAL
MVV PRED: ABNORMAL
MVV-PRE: ABNORMAL
PEF %PRED-POST: ABNORMAL
PEF %PRED-PRE: ABNORMAL
PEF PRED: ABNORMAL
PEF%CHNG: ABNORMAL
PEF-POST: ABNORMAL
PEF-PRE: ABNORMAL
RAW %PRED: ABNORMAL
RAW PRED: ABNORMAL
RAW: ABNORMAL
RV PRE %PRED: ABNORMAL
RV PRED: ABNORMAL
RV: ABNORMAL
SVC %PRED: ABNORMAL
SVC PRED: ABNORMAL
SVC: ABNORMAL
TLC PRE %PRED: ABNORMAL
TLC PRED: ABNORMAL
TLC: ABNORMAL
VA %PRED: ABNORMAL
VA PRED: ABNORMAL
VA: ABNORMAL
VTG %PRED: ABNORMAL
VTG PRED: ABNORMAL
VTG: ABNORMAL

## 2024-10-28 PROCEDURE — 94060 EVALUATION OF WHEEZING: CPT

## 2024-10-28 PROCEDURE — 94726 PLETHYSMOGRAPHY LUNG VOLUMES: CPT

## 2024-10-28 PROCEDURE — 94729 DIFFUSING CAPACITY: CPT

## 2024-11-03 DIAGNOSIS — E78.2 MIXED HYPERLIPIDEMIA: ICD-10-CM

## 2024-11-03 DIAGNOSIS — I10 ESSENTIAL (PRIMARY) HYPERTENSION: ICD-10-CM

## 2024-11-04 RX ORDER — LOSARTAN POTASSIUM 25 MG/1
25 TABLET ORAL DAILY
Qty: 90 TABLET | Refills: 1 | Status: SHIPPED | OUTPATIENT
Start: 2024-11-04

## 2024-11-04 RX ORDER — SIMVASTATIN 20 MG
20 TABLET ORAL EVERY EVENING
Qty: 90 TABLET | Refills: 1 | Status: SHIPPED | OUTPATIENT
Start: 2024-11-04

## 2024-11-15 ENCOUNTER — HOSPITAL ENCOUNTER (OUTPATIENT)
Age: 79
Setting detail: SPECIMEN
Discharge: HOME | End: 2024-11-18
Payer: COMMERCIAL

## 2024-11-15 DIAGNOSIS — Z98.84 STATUS POST GASTRIC BYPASS FOR OBESITY: ICD-10-CM

## 2024-11-15 DIAGNOSIS — E04.1 THYROID NODULE: ICD-10-CM

## 2024-11-15 DIAGNOSIS — E55.9 VITAMIN D DEFICIENCY: ICD-10-CM

## 2024-11-15 DIAGNOSIS — R41.3 MEMORY CHANGES: ICD-10-CM

## 2024-11-15 DIAGNOSIS — R73.03 PREDIABETES: ICD-10-CM

## 2024-11-15 DIAGNOSIS — I10 ESSENTIAL (PRIMARY) HYPERTENSION: ICD-10-CM

## 2024-11-15 LAB
25(OH)D3 SERPL-MCNC: 64.3 NG/ML (ref 30–100)
ALBUMIN SERPL-MCNC: 3.5 G/DL (ref 3.4–5)
ALBUMIN/GLOB SERPL: 1.1 (ref 0.8–1.7)
ALP SERPL-CCNC: 103 U/L (ref 45–117)
ALT SERPL-CCNC: 20 U/L (ref 13–56)
ANION GAP SERPL CALC-SCNC: 2 MMOL/L (ref 3–18)
AST SERPL W P-5'-P-CCNC: 12 U/L (ref 10–38)
BASOPHILS # BLD: 0.1 K/UL (ref 0–0.1)
BASOPHILS NFR BLD: 1 % (ref 0–2)
BILIRUB SERPL-MCNC: 0.5 MG/DL (ref 0.2–1)
BUN SERPL-MCNC: 21 MG/DL (ref 7–18)
BUN/CREAT SERPL: 21 (ref 12–20)
CA-I BLD-MCNC: 9.9 MG/DL (ref 8.5–10.1)
CHLORIDE SERPL-SCNC: 109 MMOL/L (ref 100–111)
CO2 SERPL-SCNC: 30 MMOL/L (ref 21–32)
CREAT SERPL-MCNC: 1.01 MG/DL (ref 0.6–1.3)
DIFFERENTIAL METHOD BLD: ABNORMAL
EOSINOPHIL # BLD: 0.2 K/UL (ref 0–0.4)
EOSINOPHIL NFR BLD: 3 % (ref 0–5)
ERYTHROCYTE [DISTWIDTH] IN BLOOD BY AUTOMATED COUNT: 12.5 % (ref 11.6–14.5)
EST. AVERAGE GLUCOSE BLD GHB EST-MCNC: 108 MG/DL
FOLATE SERPL-MCNC: >20 NG/ML (ref 3.1–17.5)
GLOBULIN SER CALC-MCNC: 3.3 G/DL (ref 2–4)
GLUCOSE SERPL-MCNC: 133 MG/DL (ref 74–99)
HBA1C MFR BLD: 5.4 % (ref 4.2–5.6)
HCT VFR BLD AUTO: 40.2 % (ref 35–45)
HGB BLD-MCNC: 12.9 G/DL (ref 12–16)
IMM GRANULOCYTES # BLD AUTO: 0 K/UL (ref 0–0.04)
IMM GRANULOCYTES NFR BLD AUTO: 1 % (ref 0–0.5)
LYMPHOCYTES # BLD: 1.5 K/UL (ref 0.9–3.6)
LYMPHOCYTES NFR BLD: 31 % (ref 21–52)
MCH RBC QN AUTO: 31.4 PG (ref 24–34)
MCHC RBC AUTO-ENTMCNC: 32.1 G/DL (ref 31–37)
MCV RBC AUTO: 97.8 FL (ref 78–100)
MONOCYTES # BLD: 0.3 K/UL (ref 0.05–1.2)
MONOCYTES NFR BLD: 7 % (ref 3–10)
NEUTS SEG # BLD: 2.7 K/UL (ref 1.8–8)
NEUTS SEG NFR BLD: 57 % (ref 40–73)
NRBC # BLD: 0 K/UL (ref 0–0.01)
NRBC BLD-RTO: 0 PER 100 WBC
PLATELET # BLD AUTO: 226 K/UL (ref 135–420)
PMV BLD AUTO: 9.6 FL (ref 9.2–11.8)
POTASSIUM SERPL-SCNC: 4.7 MMOL/L (ref 3.5–5.5)
PROT SERPL-MCNC: 6.8 G/DL (ref 6.4–8.2)
RBC # BLD AUTO: 4.11 M/UL (ref 4.2–5.3)
SODIUM SERPL-SCNC: 141 MMOL/L (ref 136–145)
TSH SERPL DL<=0.05 MIU/L-ACNC: 1.37 UIU/ML (ref 0.36–3.74)
VIT B12 SERPL-MCNC: 589 PG/ML (ref 211–911)
WBC # BLD AUTO: 4.8 K/UL (ref 4.6–13.2)

## 2024-11-15 PROCEDURE — 84443 ASSAY THYROID STIM HORMONE: CPT

## 2024-11-15 PROCEDURE — 36415 COLL VENOUS BLD VENIPUNCTURE: CPT

## 2024-11-15 PROCEDURE — 85025 COMPLETE CBC W/AUTO DIFF WBC: CPT

## 2024-11-15 PROCEDURE — 80053 COMPREHEN METABOLIC PANEL: CPT

## 2024-11-15 PROCEDURE — 84425 ASSAY OF VITAMIN B-1: CPT

## 2024-11-15 PROCEDURE — 83036 HEMOGLOBIN GLYCOSYLATED A1C: CPT

## 2024-11-15 PROCEDURE — 82306 VITAMIN D 25 HYDROXY: CPT

## 2024-11-15 PROCEDURE — 82607 VITAMIN B-12: CPT

## 2024-11-15 PROCEDURE — 82746 ASSAY OF FOLIC ACID SERUM: CPT

## 2024-11-20 LAB — VIT B1 BLD-SCNC: 115.5 NMOL/L (ref 66.5–200)

## 2024-11-21 ENCOUNTER — OFFICE VISIT (OUTPATIENT)
Facility: CLINIC | Age: 79
End: 2024-11-21
Payer: COMMERCIAL

## 2024-11-21 VITALS
HEART RATE: 77 BPM | RESPIRATION RATE: 22 BRPM | BODY MASS INDEX: 32.64 KG/M2 | OXYGEN SATURATION: 95 % | SYSTOLIC BLOOD PRESSURE: 127 MMHG | WEIGHT: 191.2 LBS | HEIGHT: 64 IN | DIASTOLIC BLOOD PRESSURE: 72 MMHG | TEMPERATURE: 97.9 F

## 2024-11-21 DIAGNOSIS — Z91.89 FRACTURE RISK ASSESSMENT SCORE (FRAX) INDICATING GREATER THAN 3% RISK FOR HIP FRACTURE: ICD-10-CM

## 2024-11-21 DIAGNOSIS — Z91.89 FRACTURE RISK ASSESSMENT SCORE (FRAX) INDICATING GREATER THAN 20% RISK FOR MAJOR OSTEOPOROSIS-RELATED FRACTURE: ICD-10-CM

## 2024-11-21 DIAGNOSIS — J44.89 ASTHMA WITH COPD (HCC): Primary | ICD-10-CM

## 2024-11-21 PROCEDURE — 3074F SYST BP LT 130 MM HG: CPT | Performed by: FAMILY MEDICINE

## 2024-11-21 PROCEDURE — 1123F ACP DISCUSS/DSCN MKR DOCD: CPT | Performed by: FAMILY MEDICINE

## 2024-11-21 PROCEDURE — 3078F DIAST BP <80 MM HG: CPT | Performed by: FAMILY MEDICINE

## 2024-11-21 PROCEDURE — 99213 OFFICE O/P EST LOW 20 MIN: CPT | Performed by: FAMILY MEDICINE

## 2024-11-21 ASSESSMENT — PATIENT HEALTH QUESTIONNAIRE - PHQ9
SUM OF ALL RESPONSES TO PHQ9 QUESTIONS 1 & 2: 0
SUM OF ALL RESPONSES TO PHQ QUESTIONS 1-9: 0
2. FEELING DOWN, DEPRESSED OR HOPELESS: NOT AT ALL
SUM OF ALL RESPONSES TO PHQ QUESTIONS 1-9: 0
SUM OF ALL RESPONSES TO PHQ QUESTIONS 1-9: 0
1. LITTLE INTEREST OR PLEASURE IN DOING THINGS: NOT AT ALL
SUM OF ALL RESPONSES TO PHQ QUESTIONS 1-9: 0

## 2024-11-21 NOTE — PROGRESS NOTES
Chief Complaint   Patient presents with    Follow-up     Chronic disease       \"Have you been to the ER, urgent care clinic since your last visit?  Hospitalized since your last visit?\"    NO    “Have you seen or consulted any other health care providers outside our system since your last visit?”    NO
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
## 2025-04-29 DIAGNOSIS — I10 ESSENTIAL (PRIMARY) HYPERTENSION: ICD-10-CM

## 2025-04-29 DIAGNOSIS — E78.2 MIXED HYPERLIPIDEMIA: ICD-10-CM

## 2025-04-29 RX ORDER — LOSARTAN POTASSIUM 25 MG/1
25 TABLET ORAL DAILY
Qty: 90 TABLET | Refills: 1 | OUTPATIENT
Start: 2025-04-29

## 2025-04-29 RX ORDER — SIMVASTATIN 20 MG
20 TABLET ORAL EVERY EVENING
Qty: 90 TABLET | Refills: 1 | OUTPATIENT
Start: 2025-04-29

## (undated) DEVICE — GLOVE SURG SZ 65 THK91MIL LTX FREE SYN POLYISOPRENE

## (undated) DEVICE — KENDALL SCD EXPRESS SLEEVES, KNEE LENGTH, MEDIUM: Brand: KENDALL SCD

## (undated) DEVICE — Device

## (undated) DEVICE — SKIN MARKER,REGULAR TIP WITH RULER: Brand: DEVON

## (undated) DEVICE — Z DISCONTINUED USE 2150869 IMMOBILIZER SHLDR L BASIC SUP ABD SLNG

## (undated) DEVICE — 90-S MAX, SUCTION PROBE, NON-BENDABLE, MAX CUT LEVEL 11: Brand: SERFAS ENERGY

## (undated) DEVICE — NDL CNTR 40CT FM MAG: Brand: MEDLINE INDUSTRIES, INC.

## (undated) DEVICE — NEEDLE SUT PASS FOR ROT CUF LABRAL REP MULTFI SCORPION

## (undated) DEVICE — SOLUTION IRRIG 500ML STRL H2O NONPYROGENIC

## (undated) DEVICE — FLUID CONTROL SHOULDER DRAPE PACK: Brand: CONVERTORS

## (undated) DEVICE — SPONGE LAP 18X18IN STRL -- 5/PK

## (undated) DEVICE — BUR SHV CUT HLLW 6 FLUT 5.5MM --

## (undated) DEVICE — (D)GLOVE SURG ORTH 7.5 PWD LTX -- DISC BY MFR USE ITEM 278014

## (undated) DEVICE — GAUZE SPONGES,16 PLY: Brand: CURITY

## (undated) DEVICE — SUTURE ABSORBABLE BRAIDED 0 CTXB 36 IN VIO PDS II ZB370

## (undated) DEVICE — BLADE SHV DIA4MM RED RESECT FOR GEN DEB REM OF PERIOST FR

## (undated) DEVICE — FLEX ADVANTAGE 3000CC: Brand: FLEX ADVANTAGE

## (undated) DEVICE — GOWN,AURORA,FABRIC-REINFORCED,X-LARGE: Brand: MEDLINE

## (undated) DEVICE — (D)GLOVE SURG TRIFLX 7.5 PWD -- DISC BY MFR USE ITEM 102005

## (undated) DEVICE — 3M™ BAIR PAWS FLEX™ WARMING GOWN, STANDARD, 20 PER CASE 81003: Brand: BAIR PAWS™

## (undated) DEVICE — INTENDED FOR TISSUE SEPARATION, AND OTHER PROCEDURES THAT REQUIRE A SHARP SURGICAL BLADE TO PUNCTURE OR CUT.: Brand: BARD-PARKER SAFETY BLADES SIZE 10, STERILE

## (undated) DEVICE — PREP SKN CHLRAPRP APPL 10.5ML --

## (undated) DEVICE — (D)GLOVE SURG ORTH 8 PWD LTX -- DISC BY MFR USE ITEM 278015

## (undated) DEVICE — CANNULA THREADED FLEX 8.0 X 72MM: Brand: CLEAR-TRAC

## (undated) DEVICE — NEEDLE NRV BLK 22GA L2IN 30DEG INSUL BVL EXTN SET STIMUPLEX

## (undated) DEVICE — NEEDLE SPNL 22GA L3.5IN BLK HUB S STL REG WALL FIT STYL W/

## (undated) DEVICE — SUTURE PDS II SZ 0 L36IN ABSRB VLT L36MM CT-1 1/2 CIR Z346H

## (undated) DEVICE — SUTURE MCRYL SZ 4-0 L18IN ABSRB UD L19MM PS-2 3/8 CIR PRIM Y496G

## (undated) DEVICE — [ARTHROSCOPY PUMP,  DO NOT USE IF PACKAGE IS DAMAGED,  KEEP DRY,  KEEP AWAY FROM SUNLIGHT,  PROTECT FROM HEAT AND RADIOACTIVE SOURCES.]: Brand: FLOSTEADY

## (undated) DEVICE — SHOULDER SUSPENSION KIT 6 PER BOX

## (undated) DEVICE — SUTURE VCRL SZ 3-0 L27IN ABSRB VLT CT-2 L26MM 1/2 CIR J332H

## (undated) DEVICE — 3M™ MICROFOAM™ SURGICAL TAPE 4 ROLLS/CARTON 6 CARTONS/CASE 1528-3: Brand: 3M™ MICROFOAM™

## (undated) DEVICE — LIGHT HANDLE: Brand: DEVON

## (undated) DEVICE — HEX-LOCKING BLADE ELECTRODE: Brand: EDGE

## (undated) DEVICE — DISPOSABLE MULTI BAG ADAPTERS Y                                    TUBING, STERILE, 2 TO A SET 6 SETS                                    PER BOX